# Patient Record
Sex: FEMALE | Race: WHITE | NOT HISPANIC OR LATINO | Employment: OTHER | URBAN - METROPOLITAN AREA
[De-identification: names, ages, dates, MRNs, and addresses within clinical notes are randomized per-mention and may not be internally consistent; named-entity substitution may affect disease eponyms.]

---

## 2017-01-10 ENCOUNTER — TELEPHONE (OUTPATIENT)
Dept: FAMILY MEDICINE | Facility: CLINIC | Age: 82
End: 2017-01-10

## 2017-01-10 ENCOUNTER — TRANSFERRED RECORDS (OUTPATIENT)
Dept: HEALTH INFORMATION MANAGEMENT | Facility: CLINIC | Age: 82
End: 2017-01-10

## 2017-01-10 DIAGNOSIS — M79.671 PAIN IN BOTH FEET: Primary | ICD-10-CM

## 2017-01-10 DIAGNOSIS — M79.672 PAIN IN BOTH FEET: Primary | ICD-10-CM

## 2017-01-10 NOTE — TELEPHONE ENCOUNTER
Spoke with pt, informed referral for Orthology referral and she will schedule her appointment.     Raghav Vigil

## 2017-01-10 NOTE — TELEPHONE ENCOUNTER
Ok to place referral, for foot pain, I do not know if anyone has made the dx of plantar fascitis.....

## 2017-01-10 NOTE — TELEPHONE ENCOUNTER
Pt requesting referral to physical therapy at [facility] on [address] in [place] for plantar fascitis in left heel  Is being seen there for broken arm   Needs referral order for Medicare    Route to Dr العلي, referral nurse  Kaela Abdul RN, BS  Message handled by Nurse Triage .

## 2017-01-10 NOTE — TELEPHONE ENCOUNTER
Spoke to pt, she does not want to go to [facility], she visited their [place] location previously and feels Orthology is better for her.       Raghav Vigil

## 2017-01-10 NOTE — TELEPHONE ENCOUNTER
[email or web address]  Prefers this PT site over [facility], is currently working with them after arm injury    Pt states she was referred to [facility] couple years ago for plantar fascitis, then was referred to Dr Barnes for condition and given steroid injection  Same symptoms have returned    Kaela Abdul RN, BS  Message handled by Nurse Triage .

## 2017-01-10 NOTE — TELEPHONE ENCOUNTER
Please ask how she got the diagnosis plantar fascitis. I do not see this in our last note.    I have never heard of this other place....

## 2017-01-10 NOTE — TELEPHONE ENCOUNTER
Pt refuses to stay with in [place] and wants a referral to Orthology.        [facility]    [phone]

## 2017-01-11 ENCOUNTER — TELEPHONE (OUTPATIENT)
Dept: FAMILY MEDICINE | Facility: CLINIC | Age: 82
End: 2017-01-11

## 2017-01-11 NOTE — TELEPHONE ENCOUNTER
Clinic Action Needed: Yes, please fax Orthology referral to [phone].   Reason for Call: Claudia calling from Orthology in [place] and requesting that a referral be faxed over to number above. This is a centralized fax number so please address it to [place] location.   Patient Recommendations: Referred to clinic for follow up.   Routed to: Dr. Bird/ STEFANIE Nevarez RN  [facility]

## 2017-02-14 DIAGNOSIS — I10 ESSENTIAL HYPERTENSION, BENIGN: ICD-10-CM

## 2017-02-14 RX ORDER — LISINOPRIL 10 MG/1
10 TABLET ORAL DAILY
Qty: 90 TABLET | Refills: 1 | Status: SHIPPED | OUTPATIENT
Start: 2017-02-14 | End: 2017-08-13

## 2017-02-14 RX ORDER — ATENOLOL 50 MG/1
50 TABLET ORAL DAILY
Qty: 90 TABLET | Refills: 1 | Status: SHIPPED | OUTPATIENT
Start: 2017-02-14 | End: 2017-08-13

## 2017-02-14 NOTE — TELEPHONE ENCOUNTER
Pending Prescriptions:                       Disp   Refills    atenolol (TENORMIN) 50 MG tablet          90 tab*1            Sig: Take 1 tablet (50 mg) by mouth daily    lisinopril (PRINIVIL/ZESTRIL) 10 MG ozapoh74 tab*1            Sig: Take 1 tablet (10 mg) by mouth daily    atenolol (TENORMIN) 50 MG tablet   Last Written Prescription Date: 08/16/16  Last Fill Quantity: 90, # refills: 1    Last Office Visit with [facility], [facility] or [facility] prescribing provider:  10/26/16     BP Readings from Last 3 Encounters:   10/20/16 124/62   02/22/16 138/70   11/13/15 164/75       lisinopril (PRINIVIL,ZESTRIL) 10 MG tablet      Last Written Prescription Date: 08/16/16  Last Fill Quantity: 90, # refills: 1  Last Office Visit with [facility], [facility] or [facility] prescribing provider: 10/26/16       Potassium   Date Value Ref Range Status   10/10/2016 4.6 3.4 - 5.3 mmol/L Final     Creatinine   Date Value Ref Range Status   10/10/2016 0.69 0.52 - 1.04 mg/dL Final     BP Readings from Last 3 Encounters:   10/20/16 124/62   02/22/16 138/70   11/13/15 164/75

## 2017-06-17 ENCOUNTER — HEALTH MAINTENANCE LETTER (OUTPATIENT)
Age: 82
End: 2017-06-17

## 2017-08-13 DIAGNOSIS — I10 ESSENTIAL HYPERTENSION, BENIGN: ICD-10-CM

## 2017-08-14 NOTE — TELEPHONE ENCOUNTER
lisinopril (PRINIVIL/ZESTRIL) 10 MG tablet      Last Written Prescription Date: 2/14/17  Last Fill Quantity: 90, # refills: 1  Last Office Visit with [facility], [facility] or [facility] prescribing provider: 10/20/2016    atenolol (TENORMIN) 50 MG tablet  Last Written Prescription Date: 2/14/17  Last Fill Quantity: 90,  # refills: 1   Last Office Visit with [facility], [facility] or [facility] prescribing provider:  10/20/2016                Potassium   Date Value Ref Range Status   10/10/2016 4.6 3.4 - 5.3 mmol/L Final     Creatinine   Date Value Ref Range Status   10/10/2016 0.69 0.52 - 1.04 mg/dL Final     BP Readings from Last 3 Encounters:   10/20/16 124/62   02/22/16 138/70   11/13/15 164/75         [facility]  August 14, 2017  9:44 AM

## 2017-08-15 RX ORDER — ATENOLOL 50 MG/1
TABLET ORAL
Qty: 90 TABLET | Refills: 0 | Status: SHIPPED | OUTPATIENT
Start: 2017-08-15 | End: 2017-11-20

## 2017-08-15 RX ORDER — LISINOPRIL 10 MG/1
TABLET ORAL
Qty: 90 TABLET | Refills: 0 | Status: SHIPPED | OUTPATIENT
Start: 2017-08-15 | End: 2017-11-20

## 2017-08-15 NOTE — TELEPHONE ENCOUNTER
Prescription approved per [facility] Refill Protocol.  Chyna Coleman RN

## 2017-08-17 ENCOUNTER — TELEPHONE (OUTPATIENT)
Dept: BONE DENSITY | Facility: CLINIC | Age: 82
End: 2017-08-17

## 2017-08-17 ENCOUNTER — TRANSFERRED RECORDS (OUTPATIENT)
Dept: HEALTH INFORMATION MANAGEMENT | Facility: CLINIC | Age: 82
End: 2017-08-17

## 2017-08-29 ENCOUNTER — OFFICE VISIT (OUTPATIENT)
Dept: DERMATOLOGY | Facility: CLINIC | Age: 82
End: 2017-08-29
Attending: FAMILY MEDICINE
Payer: MEDICARE

## 2017-08-29 VITALS
DIASTOLIC BLOOD PRESSURE: 76 MMHG | OXYGEN SATURATION: 94 % | HEART RATE: 62 BPM | SYSTOLIC BLOOD PRESSURE: 138 MMHG | BODY MASS INDEX: 33.19 KG/M2 | WEIGHT: 185.9 LBS

## 2017-08-29 DIAGNOSIS — L30.9 DERMATITIS: Primary | ICD-10-CM

## 2017-08-29 DIAGNOSIS — L30.4 INTERTRIGO: ICD-10-CM

## 2017-08-29 DIAGNOSIS — D48.5 NEOPLASM OF UNCERTAIN BEHAVIOR OF SKIN: ICD-10-CM

## 2017-08-29 DIAGNOSIS — L57.0 AK (ACTINIC KERATOSIS): ICD-10-CM

## 2017-08-29 PROCEDURE — 88305 TISSUE EXAM BY PATHOLOGIST: CPT | Performed by: DERMATOLOGY

## 2017-08-29 PROCEDURE — 17000 DESTRUCT PREMALG LESION: CPT | Performed by: DERMATOLOGY

## 2017-08-29 PROCEDURE — 11100 HC BIOPSY SKIN/SUBQ/MUC MEM, SINGLE LESION: CPT | Mod: 59 | Performed by: DERMATOLOGY

## 2017-08-29 PROCEDURE — 99203 OFFICE O/P NEW LOW 30 MIN: CPT | Mod: 25 | Performed by: DERMATOLOGY

## 2017-08-29 RX ORDER — TRIAMCINOLONE ACETONIDE 1 MG/G
OINTMENT TOPICAL
Qty: 80 G | Refills: 2 | Status: SHIPPED | OUTPATIENT
Start: 2017-08-29 | End: 2020-06-15

## 2017-08-29 RX ORDER — LIDOCAINE HYDROCHLORIDE AND EPINEPHRINE 10; 10 MG/ML; UG/ML
3 INJECTION, SOLUTION INFILTRATION; PERINEURAL ONCE
Qty: 3 ML | Refills: 0 | OUTPATIENT
Start: 2017-08-29 | End: 2017-08-29

## 2017-08-29 RX ORDER — MUPIROCIN 20 MG/G
OINTMENT TOPICAL
Qty: 22 G | Refills: 1 | Status: SHIPPED | OUTPATIENT
Start: 2017-08-29 | End: 2020-06-15

## 2017-08-29 RX ORDER — NYSTATIN 100000 U/G
OINTMENT TOPICAL
Qty: 30 G | Refills: 1 | Status: SHIPPED | OUTPATIENT
Start: 2017-08-29 | End: 2020-06-15

## 2017-08-29 NOTE — PROGRESS NOTES
DERMATOLOGY CLINIC VISIT NOTE      CHIEF COMPLAINT:  Spots on face.      DERMATOLOGY PROBLEM LIST:   1.  History of multiple basal cell carcinomas localized to the left medial canthus, the nose and forehead.  She was previously seen in the [facility] system.  Last skin malignancy 2-1/2 years ago.   2.  Seborrheic keratoses.   3.  Actinic keratoses.   4.  History of x-ray exposure to the face.      HISTORY OF PRESENT ILLNESS:  Ms. Graham is an 86-year-old female presenting to Dermatology Clinic seen at the request of her primary provider, Miriam Bird, for initial evaluation of several skin concerns today.  She notes a nonhealing sore inside the right nares present for many months.  She has a bump on the left medial canthus becomes irritated.  She has a scaling bump on the left forehead present for several months with no past treatment.  She has a scaling rash on her right fifth finger with no past treatment that is pruritic and she has a scaling rash in her inguinal creases that is pruritic.      Patient Active Problem List   Diagnosis     Mixed hyperlipidemia     Essential hypertension, benign     Myalgia and myositis     Arthralgia of knee     HYPERLIPIDEMIA LDL GOAL <130     Gout     Advanced directives, counseling/discussion     Vitamin D insufficiency     Plantar fasciitis     Cardiomegaly     Malignant neoplasm of upper-outer quadrant of left female breast (HCC)       No Known Allergies      Current Outpatient Prescriptions   Medication     mupirocin (BACTROBAN) 2 % ointment     triamcinolone (KENALOG) 0.1 % ointment     nystatin (MYCOSTATIN) ointment     lisinopril (PRINIVIL/ZESTRIL) 10 MG tablet     atenolol (TENORMIN) 50 MG tablet     simvastatin (ZOCOR) 10 MG tablet     clotrimazole (LOTRIMIN) 1 % cream     furosemide (LASIX) 20 MG tablet     ibuprofen (ADVIL,MOTRIN) 400 MG tablet     triamcinolone (KENALOG) 0.1 % cream     fish oil-omega-3 fatty acids (FISH OIL) 1000 MG capsule     Multiple  Vitamins-Minerals (OCUVITE-LUTEIN PO)     cholecalciferol (VITAMIN-D) 1000 UNIT capsule     nystatin (MYCOSTATIN) cream     ASPIR-81 OR     No current facility-administered medications for this visit.           REVIEW OF SYSTEMS:  Feels well without other skin concerns.      SOCIAL HISTORY:  The patient is a retired nurse.  She lives in [place].  She is .      PHYSICAL EXAMINATION:   /76  Pulse 62  Wt 84.3 kg (185 lb 14.4 oz)  LMP 07/14/1966  SpO2 94%  BMI 33.19 kg/m2    GENERAL:  The patient is a healthy-appearing 86-year-old female in no distress.   HEENT:  Conjunctivae are clear.   PULMONARY:  Breathing comfortably on room air.     SKIN:  Exam included the scalp, face, neck, hands, abdomen and inguinal creases.  Skin exam is normal except for as follows:   - Examination of the left temple shows a gritty, pink papule.   - Examination of the right nasal introitus shows a 2 mm pink papule with overlying yellow crusting.   - Left medial canthus with a 2 mm shiny, pink papule with overlying red blood cell crusting.   - Ill-defined, pink plaques with maceration in the inguinal creases bilaterally.   - Right fifth finger with circumferential scaling plaque.      ASSESSMENT AND PLAN:   1.  Actinic keratosis on left temple; treated with 10-second freeze/thaw cycle with liquid nitrogen.  We will recheck at next visit to ensure resolution.   2.  Neoplasm of uncertain behavior in left medial canthus; differential diagnosis would include a traumatized seborrheic keratosis versus basal cell carcinoma.  Biopsy today.  A total of 0.5 mL of lidocaine with epinephrine was injected into the medial canthus.  An iris scissors was used for a snip biopsy of the lesion and the base was treated with electrodessication.  Petrolatum applied.   3.  Contact dermatitis of right fifth finger; differential includes allergic versus irritant contact dermatitis from patient's jewelry.  Triamcinolone 0.1% ointment prescribed.    4.  Intertrigo; nystatin ointment recommended to be applied twice daily.      The patient to return pending results of biopsy.        Thank you for this consultation.      Jj Goetz MD  Dermatology Staff       cc:   Miriam Bird MD   [address]   [place] [address]         JJ GOETZ MD             D: 2017 15:23   T: 2017 16:14   MT: [email or web address]      Name:     SANIA SANTANA   MRN:      [id number]        Account:      [id number]   :      1931           Service Date: 2017      Document: [id number]

## 2017-08-29 NOTE — LETTER
8/29/2017      RE: Tanya Graham  [id number]  [facility] [address]               DERMATOLOGY CLINIC VISIT NOTE      CHIEF COMPLAINT:  Spots on face.      DERMATOLOGY PROBLEM LIST:   1.  History of multiple basal cell carcinomas localized to the left medial canthus, the nose and forehead.  She was previously seen in the [facility] system.  Last skin malignancy 2-1/2 years ago.   2.  Seborrheic keratoses.   3.  Actinic keratoses.   4.  History of x-ray exposure to the face.      HISTORY OF PRESENT ILLNESS:  Ms. Graham is an 86-year-old female presenting to Dermatology Clinic seen at the request of her primary provider, Miriam Bird, for initial evaluation of several skin concerns today.  She notes a nonhealing sore inside the right nares present for many months.  She has a bump on the left medial canthus becomes irritated.  She has a scaling bump on the left forehead present for several months with no past treatment.  She has a scaling rash on her right fifth finger with no past treatment that is pruritic and she has a scaling rash in her inguinal creases that is pruritic.      Patient Active Problem List   Diagnosis     Mixed hyperlipidemia     Essential hypertension, benign     Myalgia and myositis     Arthralgia of knee     HYPERLIPIDEMIA LDL GOAL <130     Gout     Advanced directives, counseling/discussion     Vitamin D insufficiency     Plantar fasciitis     Cardiomegaly     Malignant neoplasm of upper-outer quadrant of left female breast (HCC)       No Known Allergies      Current Outpatient Prescriptions   Medication     mupirocin (BACTROBAN) 2 % ointment     triamcinolone (KENALOG) 0.1 % ointment     nystatin (MYCOSTATIN) ointment     lisinopril (PRINIVIL/ZESTRIL) 10 MG tablet     atenolol (TENORMIN) 50 MG tablet     simvastatin (ZOCOR) 10 MG tablet     clotrimazole (LOTRIMIN) 1 % cream     furosemide (LASIX) 20 MG tablet     ibuprofen (ADVIL,MOTRIN) 400 MG tablet     triamcinolone (KENALOG) 0.1 %  cream     fish oil-omega-3 fatty acids (FISH OIL) 1000 MG capsule     Multiple Vitamins-Minerals (OCUVITE-LUTEIN PO)     cholecalciferol (VITAMIN-D) 1000 UNIT capsule     nystatin (MYCOSTATIN) cream     ASPIR-81 OR     No current facility-administered medications for this visit.           REVIEW OF SYSTEMS:  Feels well without other skin concerns.      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      PHYSICAL EXAMINATION:   /76  Pulse 62  Wt 84.3 kg (185 lb 14.4 oz)  LMP 07/14/1966  SpO2 94%  BMI 33.19 kg/m2    GENERAL:  The patient is a healthy-appearing 86-year-old female in no distress.   HEENT:  Conjunctivae are clear.   PULMONARY:  Breathing comfortably on room air.     SKIN:  Exam included the scalp, face, neck, hands, abdomen and inguinal creases.  Skin exam is normal except for as follows:   - Examination of the left temple shows a gritty, pink papule.   - Examination of the right nasal introitus shows a 2 mm pink papule with overlying yellow crusting.   - Left medial canthus with a 2 mm shiny, pink papule with overlying red blood cell crusting.   - Ill-defined, pink plaques with maceration in the inguinal creases bilaterally.   - Right fifth finger with circumferential scaling plaque.      ASSESSMENT AND PLAN:   1.  Actinic keratosis on left temple; treated with 10-second freeze/thaw cycle with liquid nitrogen.  We will recheck at next visit to ensure resolution.   2.  Neoplasm of uncertain behavior in left medial canthus; differential diagnosis would include a traumatized seborrheic keratosis versus basal cell carcinoma.  Biopsy today.  A total of 0.5 mL of lidocaine with epinephrine was injected into the medial canthus.  An iris scissors was used for a snip biopsy of the lesion and the base was treated with electrodessication.  Petrolatum applied.   3.  Contact dermatitis of right fifth finger; differential includes allergic versus irritant  contact dermatitis from patient's jewelry.  Triamcinolone 0.1% ointment prescribed.   4.  Intertrigo; nystatin ointment recommended to be applied twice daily.      The patient to return pending results of biopsy.        Thank you for this consultation.      Jj Goetz MD  Dermatology Staff       cc:   Miriam Bird MD   [address]   [place] [address]         JJ GOETZ MD             D: 2017 15:23   T: 2017 16:14   MT: keshia      Name:     SANIA SANTANA   MRN:      [id number]        Account:      [id number]   :      1931           Service Date: 2017      Document: [id number]

## 2017-08-29 NOTE — NURSING NOTE
"Chief Complaint   Patient presents with     Consult     LEFT TEMPLE, NEAR LEFT EYE, RIGHT NOSTRIL       Initial /76  Pulse 62  Wt 185 lb 14.4 oz (84.3 kg)  LMP 07/14/1966  SpO2 94%  BMI 33.19 kg/m2 Estimated body mass index is 33.19 kg/(m^2) as calculated from the following:    Height as of 7/22/15: 5' 2.75\" (159.4 cm).    Weight as of this encounter: 185 lb 14.4 oz (84.3 kg).  Medication Reconciliation: complete  Constance Tamez, MARSHA  "

## 2017-09-01 LAB — COPATH REPORT: NORMAL

## 2017-09-14 ENCOUNTER — RADIANT APPOINTMENT (OUTPATIENT)
Dept: BONE DENSITY | Facility: CLINIC | Age: 82
End: 2017-09-14
Attending: INTERNAL MEDICINE
Payer: MEDICARE

## 2017-09-14 ENCOUNTER — HOSPITAL ENCOUNTER (OUTPATIENT)
Dept: MAMMOGRAPHY | Facility: CLINIC | Age: 82
Discharge: HOME | End: 2017-09-14
Attending: INTERNAL MEDICINE | Admitting: INTERNAL MEDICINE
Payer: MEDICARE

## 2017-09-14 DIAGNOSIS — Z85.3 HISTORY OF BREAST CANCER: ICD-10-CM

## 2017-09-14 DIAGNOSIS — M85.80 OSTEOPENIA, UNSPECIFIED LOCATION: ICD-10-CM

## 2017-09-14 DIAGNOSIS — Z12.31 VISIT FOR SCREENING MAMMOGRAM: ICD-10-CM

## 2017-09-14 DIAGNOSIS — Z78.0 POST-MENOPAUSAL: ICD-10-CM

## 2017-09-14 PROCEDURE — 77063 BREAST TOMOSYNTHESIS BI: CPT

## 2017-09-14 PROCEDURE — 77085 DXA BONE DENSITY AXL VRT FX: CPT | Performed by: INTERNAL MEDICINE

## 2017-09-15 ENCOUNTER — TELEPHONE (OUTPATIENT)
Dept: BONE DENSITY | Facility: CLINIC | Age: 82
End: 2017-09-15

## 2017-09-15 NOTE — TELEPHONE ENCOUNTER
dexa scan and report both faxed and mailed to [facility] [address] [facility] [address]  [phone] fax

## 2017-09-16 DIAGNOSIS — E78.2 MIXED HYPERLIPIDEMIA: ICD-10-CM

## 2017-09-18 RX ORDER — SIMVASTATIN 10 MG
10 TABLET ORAL AT BEDTIME
Qty: 90 TABLET | Refills: 0 | Status: SHIPPED | OUTPATIENT
Start: 2017-09-18 | End: 2017-11-27

## 2017-09-18 NOTE — TELEPHONE ENCOUNTER
Medication is being filled for 1 time refill only due to:  Patient needs to be seen because pt due for yearly visit October.

## 2017-09-18 NOTE — TELEPHONE ENCOUNTER
simvastatin (ZOCOR) 10 MG tablet     Last Written Prescription Date: 12/22/16  Last Fill Quantity: 90, # refills: 2  Last Office Visit with [facility], [facility] or [facility] prescribing provider: 10/20/2016         Lab Results   Component Value Date    CHOL 166 10/10/2016     Lab Results   Component Value Date    HDL 46 10/10/2016     Lab Results   Component Value Date    LDL 94 10/10/2016     Lab Results   Component Value Date    TRIG 128 10/10/2016     Lab Results   Component Value Date    CHOLHDLRATIO 4.4 05/09/2014     [facility]  September 18, 2017  8:54 AM

## 2017-09-19 ENCOUNTER — TRANSFERRED RECORDS (OUTPATIENT)
Dept: HEALTH INFORMATION MANAGEMENT | Facility: CLINIC | Age: 82
End: 2017-09-19

## 2017-11-08 ENCOUNTER — TELEPHONE (OUTPATIENT)
Dept: FAMILY MEDICINE | Facility: CLINIC | Age: 82
End: 2017-11-08

## 2017-11-08 NOTE — TELEPHONE ENCOUNTER
Call out to pt  R/S appt to obtain physical, will get flu shot at appt  Will have enough meds until appt    Kaela Abdul RN, BS  Clinical Nurse Triage.

## 2017-11-08 NOTE — TELEPHONE ENCOUNTER
Ph. [phone] able to leave message    Patient requesting fasting lab orders placed.    Patient scheduled future appt with PCP however declined  Px    Please call to schedule lab and nurse only for flu shot    Lisa Linder

## 2017-11-20 DIAGNOSIS — I10 ESSENTIAL HYPERTENSION, BENIGN: ICD-10-CM

## 2017-11-20 NOTE — TELEPHONE ENCOUNTER
Last Office Visit with [facility], [facility] or [facility] prescribing provider: 10/20/2016    Next 5 appointments (look out 90 days)     Nov 27, 2017 10:20 AM CST   [facility]    [address], [address]  [facility] [address] [phone]            Nov 28, 2017 10:15 AM CST   [facility]    [address] [place] [address]  [address]  [facility] [address] [phone]

## 2017-11-21 RX ORDER — LISINOPRIL 10 MG/1
TABLET ORAL
Qty: 90 TABLET | Refills: 0 | Status: SHIPPED | OUTPATIENT
Start: 2017-11-21 | End: 2017-11-27

## 2017-11-21 RX ORDER — ATENOLOL 50 MG/1
TABLET ORAL
Qty: 90 TABLET | Refills: 0 | Status: SHIPPED | OUTPATIENT
Start: 2017-11-21 | End: 2017-11-27

## 2017-11-24 NOTE — PROGRESS NOTES
"SUBJECTIVE:   Tanya Graham is a 86 year old female who presents for Preventive Visit.  {PVP to remind patient that this is not necessarily a physical exam; physical exam may or may not be done:841382::\"click delete button to remove this line now\"}  {PVP to inform patient that additional E&M charge may apply, if additional problems addressed:076806::\"click delete button to remove this line now\"}  Are you in the first 12 months of your Medicare coverage?  {No Yes:906216::\"No\"}    HPI    {AWV Cognitive Screenin}    Reviewed and updated as needed this visit by clinical staff         Reviewed and updated as needed this visit by Provider        Social History   Substance Use Topics     Smoking status: Never Smoker     Smokeless tobacco: Never Used     Alcohol use No       {ETOH AUDIT:341438}    {Outside tests to abstract? :841826}    {additional problems to add (Optional):768047}    Today's PHQ-2 Score:   PHQ-2 (  [employer]) 2016   Q1: Little interest or pleasure in doing things 0   Q2: Feeling down, depressed or hopeless 0   PHQ-2 Score 0       Do you feel safe in your environment - {YES/NO/NA:285676}    Do you have a Health Care Directive?: {HEALTHCARE DIRECTIVE STATUS:299616}    Current providers sharing in care for this patient include:   Patient Care Team:  Miriam Bird MD as PCP - General ([facility])  Miriam Bird MD ([facility])      Hearing impairment: {NO/YES:691392}    Ability to successfully perform activities of daily living: {YES/NO (MEDICARE):543139::\"Yes, no assistance needed\"}     Fall risk:  {Document Fall Risk in the Assessments Section of the Navigator:779427}    Home safety:  {IPPE SAFETY CONCERNS:469337::\"none identified\"}  {If any of the above assessments are answered yes, consider ordering appropriate referrals (Optional):553748::\"click delete button to remove this line now\"}    The following health maintenance items are reviewed in Epic and correct as " "of today:  Health Maintenance   Topic Date Due     MEDICARE ANNUAL WELLNESS VISIT  1949     COLONOSCOPY Q5 YR  2012     TETANUS IMMUNIZATION (SYSTEM ASSIGNED)  2016     FALL RISK ASSESSMENT  2016     ADVANCE DIRECTIVE PLANNING Q5 YRS  2016     INFLUENZA VACCINE (SYSTEM ASSIGNED)  2017     MAMMO Q1 YR  2018     DEXA Q3 YR  2020     PNEUMOCOCCAL  Completed     {Chronicprobdata (Optional):650702}    {Decision Support (Optional):011847}    Review of Systems  {ROS COMP:897855}    OBJECTIVE:   LMP 1966 Estimated body mass index is 33.19 kg/(m^2) as calculated from the following:    Height as of 7/22/15: 5' 2.75\" (1.594 m).    Weight as of 17: 185 lb 14.4 oz (84.3 kg).  Physical Exam  {Exam :564619}    ASSESSMENT / PLAN:   {Diag Picklist:515744}    End of Life Planning:  Patient currently has an advanced directive: { :216521}    COUNSELING:  {Medicare Counselin}    {BP Counseling- Complete if BP >= 120/80  (Optional):813036}    Estimated body mass index is 33.19 kg/(m^2) as calculated from the following:    Height as of 7/22/15: 5' 2.75\" (1.594 m).    Weight as of 17: 185 lb 14.4 oz (84.3 kg).  {Weight Management Plan -- Complete if patient has an abnormal BMI (Optional):386533}   reports that she has never smoked. She has never used smokeless tobacco.  {Tobacco Cessation -- Complete if patient is a smoker (Optional):832157}    Appropriate preventive services were discussed with this patient, including applicable screening as appropriate for cardiovascular disease, diabetes, osteopenia/osteoporosis, and glaucoma.  As appropriate for age/gender, discussed screening for colorectal cancer, prostate cancer, breast cancer, and cervical cancer. Checklist reviewing preventive services available has been given to the patient.    Reviewed patients plan of care and provided an AVS. The {CarePlan:016130} for Tanya meets the Care Plan requirement. This Care Plan " has been established and reviewed with the {PATIENT, FAMILY MEMBER, CAREGIVER:694905}.    Counseling Resources:  ATP IV Guidelines  Pooled Cohorts Equation Calculator  Breast Cancer Risk Calculator  FRAX Risk Assessment  ICSI Preventive Guidelines  Dietary Guidelines for Americans, 2010  USDA's MyPlate  ASA Prophylaxis  Lung CA Screening    Miriam Bird MD  [facility]

## 2017-11-24 NOTE — PATIENT INSTRUCTIONS
Preventive Health Recommendations    Female Ages 65 +    Yearly exam:     See your health care provider every year in order to  o Review health changes.   o Discuss preventive care.    o Review your medicines if your doctor has prescribed any.      You no longer need a yearly Pap test unless you've had an abnormal Pap test in the past 10 years. If you have vaginal symptoms, such as bleeding or discharge, be sure to talk with your provider about a Pap test.      Every 1 to 2 years, have a mammogram.  If you are over 69, talk with your health care provider about whether or not you want to continue having screening mammograms.      Every 10 years, have a colonoscopy. Or, have a yearly FIT test (stool test). These exams will check for colon cancer.       Have a cholesterol test every 5 years, or more often if your doctor advises it.       Have a diabetes test (fasting glucose) every three years. If you are at risk for diabetes, you should have this test more often.       At age 65, have a bone density scan (DEXA) to check for osteoporosis (brittle bone disease).    Shots:    Get a flu shot each year.    Get a tetanus shot every 10 years.    Talk to your doctor about your pneumonia vaccines. There are now two you should receive - Pneumovax (PPSV 23) and Prevnar (PCV 13).    Talk to your doctor about the shingles vaccine.    Talk to your doctor about the hepatitis B vaccine.    Nutrition:     Eat at least 5 servings of fruits and vegetables each day.      Eat whole-grain bread, whole-wheat pasta and brown rice instead of white grains and rice.      Talk to your provider about Calcium and Vitamin D.     Lifestyle    Exercise at least 150 minutes a week (30 minutes a day, 5 days a week). This will help you control your weight and prevent disease.      Limit alcohol to one drink per day.      No smoking.       Wear sunscreen to prevent skin cancer.       See your dentist twice a year for an exam and cleaning.      See your  eye doctor every 1 to 2 years to screen for conditions such as glaucoma, macular degeneration and cataracts.      Services Typically covered by Medicare Recommended Completed   Vaccines  Pneumonoccol  Influenza  Hepatitis B (if medium/high risk)   Once for patients after age 65  Yearly  Medium/high risk factors:  End Stage Kidney Disease  Hemophiliacs who received Factor XIII or IX concentrates  Clients of institutions for developmentally disabled  Persons who live in same house as a Hepatitis B carrier  Homosexual men  Illicit injectable drug users  Health care workers     Mammogram Covered: One-time screen between age 35-39, annually for age 40+     Pap and Pelvic Exam Covered: Annually if  high risk,  or childbearing age with abnormal Pap in last 3 years.  Q24 months for all other women     Prostate Cancer Screening  Digital rectal exam  PSA Covered: Annually for all men > age 50     Corolrectal Cancer Screening Screening colonoscopy every 10 years, more often for high risk patients     Diabetes Self-Management Training Requires referral by treating physician for patient with diabetes     Diabetes Screening  Fasting blood sugar or glucose tolerance test   Once yearly, twice yearly if prediabetic     Cardiovascular Screening Blood Tests  Total Cholesterol  HDL  Triglycerides Every 5 years     Medical Nutrition Therapy for Diabetes or Renal Disease Requires referral by treating physician for patient with diabetes or kidney disease     Glaucoma Screening Annually for patients with one of the following risk factors:  Diabetes Mellitus  Family history of Glaucoma  -American age 50 and over  -American age 65 and over     Bone Mass Measurement Every 24 months if one of the following risk factors:  Estrogen deficiency  Vertebral abnormalities on x-ray indicative of Osteoporosis, Osteopenia, or Vertebral fracture  Receiving/expected to receive the equivalent of at least 5 mg of Prednisone per day for > 3  months  Hyperparathyroidism  Patient being monitored for response to Osteoporosis Therapy     One-time AAA screen  Must be ordered as part of Medicare IPPE Any patient with a family history of AAA  Males Age 65-75, with history of smoking at least 100 cigarettes in lifetime     Smoking Cessation Counseling Beneficiaries who use tobacco are eligible to receive 2 cessation attempts per year; each attempt includes maximum of 4 sessions     HIV Screening Annually for beneficiaries at increased risk:       Increased risk for HIV infection is defined in the  National Coverage Determinations (NCD) Manual,  Publication 100-03 Sections 190.14 (diagnostic) and 210.7 (screening). See http://www.cms.gov/manuals/downloads/fhx807y4_Aosn7.pdf and http://www.cms.gov/manuals/downloads/wme239o5_Wzjr1.pdf on the Internet.  Three times per pregnancy for beneficiaries who are pregnant.     Future Annual Wellness Visit Annually, for all beneficiaries.

## 2017-11-27 ENCOUNTER — OFFICE VISIT (OUTPATIENT)
Dept: FAMILY MEDICINE | Facility: CLINIC | Age: 82
End: 2017-11-27
Payer: MEDICARE

## 2017-11-27 VITALS
TEMPERATURE: 98.1 F | HEART RATE: 68 BPM | SYSTOLIC BLOOD PRESSURE: 136 MMHG | HEIGHT: 63 IN | RESPIRATION RATE: 16 BRPM | BODY MASS INDEX: 32.59 KG/M2 | OXYGEN SATURATION: 95 % | DIASTOLIC BLOOD PRESSURE: 70 MMHG | WEIGHT: 183.9 LBS

## 2017-11-27 DIAGNOSIS — E78.2 MIXED HYPERLIPIDEMIA: ICD-10-CM

## 2017-11-27 DIAGNOSIS — I10 ESSENTIAL HYPERTENSION, BENIGN: ICD-10-CM

## 2017-11-27 DIAGNOSIS — R26.89 BALANCE PROBLEMS: ICD-10-CM

## 2017-11-27 DIAGNOSIS — C50.412 MALIGNANT NEOPLASM OF UPPER-OUTER QUADRANT OF LEFT FEMALE BREAST, UNSPECIFIED ESTROGEN RECEPTOR STATUS (H): ICD-10-CM

## 2017-11-27 DIAGNOSIS — Z00.00 MEDICARE ANNUAL WELLNESS VISIT, SUBSEQUENT: Primary | ICD-10-CM

## 2017-11-27 PROCEDURE — 90472 IMMUNIZATION ADMIN EACH ADD: CPT | Performed by: FAMILY MEDICINE

## 2017-11-27 PROCEDURE — G0439 PPPS, SUBSEQ VISIT: HCPCS | Performed by: FAMILY MEDICINE

## 2017-11-27 PROCEDURE — 90662 IIV NO PRSV INCREASED AG IM: CPT | Performed by: FAMILY MEDICINE

## 2017-11-27 PROCEDURE — G0008 ADMIN INFLUENZA VIRUS VAC: HCPCS | Performed by: FAMILY MEDICINE

## 2017-11-27 PROCEDURE — 90714 TD VACC NO PRESV 7 YRS+ IM: CPT | Performed by: FAMILY MEDICINE

## 2017-11-27 RX ORDER — ATENOLOL 50 MG/1
TABLET ORAL
Qty: 90 TABLET | Refills: 1 | Status: SHIPPED | OUTPATIENT
Start: 2017-11-27 | End: 2018-08-24

## 2017-11-27 RX ORDER — SIMVASTATIN 10 MG
10 TABLET ORAL AT BEDTIME
Qty: 90 TABLET | Refills: 3 | Status: SHIPPED | OUTPATIENT
Start: 2017-11-27 | End: 2018-11-06

## 2017-11-27 RX ORDER — LISINOPRIL 10 MG/1
TABLET ORAL
Qty: 90 TABLET | Refills: 1 | Status: SHIPPED | OUTPATIENT
Start: 2017-11-27 | End: 2018-08-10

## 2017-11-27 ASSESSMENT — PAIN SCALES - GENERAL: PAINLEVEL: NO PAIN (0)

## 2017-11-27 NOTE — PROGRESS NOTES
1.  Has the patient received the information for the influenza vaccine?  YES  2.  Does the patient have any of the following contraindications?         Allergy to eggs?  NO       Allergic reaction to previous influenza vaccines?  NO       Paralyzed by Guillain-Tarboro syndrome?  NO       Currently have moderate or severe illness?  NO       Allergy to contact lens solution/thimerosol?  NO  3.  The vaccine has been administered and the patient was instructed to        wait 15 minutes before leaving the building in the event of an allergic        reaction:  YES    Vaccination given by Lisa Magill, CMA

## 2017-11-27 NOTE — PROGRESS NOTES
"  SUBJECTIVE:   Tanya Graham is a 86 year old female who presents for Preventive Visit.  Are you in the first 12 months of your Medicare Part B coverage?  No    Healthy Habits:    Do you get at least three servings of calcium containing foods daily (dairy, green leafy vegetables, etc.)? yes    Amount of exercise or daily activities, outside of work: tries     Problems taking medications regularly Yes- swallowing     Medication side effects: No    Have you had an eye exam in the past two years? yes    Do you see a dentist twice per year? yes    Do you have sleep apnea, excessive snoring or daytime drowsiness?no    COGNITIVE SCREEN  1) Repeat 3 items (Banana, Sunrise, Chair)    2) Clock draw: NORMAL  3) 3 item recall: Recalls 2 objects   Results: NORMAL clock, 1-2 items recalled: COGNITIVE IMPAIRMENT LESS LIKELY  Mini-CogTM Copyright S [employer]. Licensed by the author for use in [facility]; reprinted with permission ([email or web address]). All rights reserved.      Cancer  Patient has a history of breast cancer, she is not 10 yrs out and was told recently by her oncology, no further appointment with them needed, just annual breast exam and mammogram ordered by pcp office.   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     Balance   The patient experiences occasional problems with balance while walking, and will sometimes us a cane for support. She has not fallen recently but is afraid she might. Is not active.    Diet  She reports that her diet has been \"too much food\" recently, but she generally gets regular servings of fruit and vegetables in her diet.     Vaccinations  Patient accepts an influenza and TD vaccinations today.     PROBLEMS TO ADD ON...    Reviewed and updated as needed this visit by clinical staffTobacco  Allergies  Meds  Problems  Med Hx  Surg Hx  Fam Hx  Soc Hx        Reviewed and updated as needed this visit by Provider      "   Social History   Substance Use Topics     Smoking status: Never Smoker     Smokeless tobacco: Never Used     Alcohol use No       The patient does not drink >3 drinks per day nor >7 drinks per week.     Today's PHQ-2 Score:   PHQ-2 ( 1999 Pfizer) 11/27/2017 2/22/2016   Q1: Little interest or pleasure in doing things 0 0   Q2: Feeling down, depressed or hopeless 0 0   PHQ-2 Score 0 0     Do you feel safe in your environment - YES    Do you have a Health Care Directive?: Yes: Patient states has Advance Directive and will bring in a copy to clinic.    Current providers sharing in care for this patient include: Patient Care Team:  Miriam Bird MD as PCP - General (Family Practice)  Miriam Bird MD (Family Practice)      Hearing impairment: No    Ability to successfully perform activities of daily living: Yes, no assistance needed     Fall risk:  Fallen 2 or more times in the past year?: No  Any fall with injury in the past year?: No      Home safety:  lack of grab bars in the bathroom  click delete button to remove this line now    The following health maintenance items are reviewed in Epic and correct as of today:  Health Maintenance   Topic Date Due     COLONOSCOPY Q5 YR  03/19/2012     TETANUS IMMUNIZATION (SYSTEM ASSIGNED)  01/25/2016     FALL RISK ASSESSMENT  07/02/2016     ADVANCE DIRECTIVE PLANNING Q5 YRS  12/13/2016     INFLUENZA VACCINE (SYSTEM ASSIGNED)  09/01/2017     MAMMO Q1 YR  09/14/2018     MEDICARE ANNUAL WELLNESS VISIT  11/27/2018     DEXA Q3 YR  09/14/2020     PNEUMOCOCCAL  Completed     Labs reviewed in EPIC  BP Readings from Last 3 Encounters:   11/27/17 140/70   08/29/17 138/76   10/20/16 124/62    Wt Readings from Last 3 Encounters:   11/27/17 83.4 kg (183 lb 14.4 oz)   08/29/17 84.3 kg (185 lb 14.4 oz)   10/20/16 85.7 kg (189 lb)             ROS:C: NEGATIVE for fever, chills, change in weight  I: NEGATIVE for worrisome rashes, moles or lesions  E: NEGATIVE for vision  "changes or irritation  E/M: NEGATIVE for ear, mouth and throat problems  R: NEGATIVE for significant cough or SOB  B: NEGATIVE for masses, tenderness or discharge  CV: NEGATIVE for chest pain, palpitations or peripheral edema  GI: NEGATIVE for nausea, abdominal pain, heartburn, or change in bowel habits  : NEGATIVE for frequency, dysuria, or hematuria  M: NEGATIVE for significant arthralgias or myalgia  N: NEGATIVE for weakness, dizziness or paresthesias, POSITIVE for balance issues  E: NEGATIVE for temperature intolerance, skin/hair changes  H: NEGATIVE for bleeding problems  P: NEGATIVE for changes in mood or affect    This document serves as a record of the services and decisions personally performed and made by Miriam Bird MD. It was created on his/her behalf by Mike Diego, a trained medical scribe. The creation of this document is based the provider's statements to the medical scribe.  Scribsatya Diego 11:04 AM, November 27, 2017  OBJECTIVE:   /70 (BP Location: Right arm, Patient Position: Chair, Cuff Size: Adult Regular)  Pulse 68  Temp 98.1  F (36.7  C) (Oral)  Resp 16  Ht 5' 2.75\" (1.594 m)  Wt 183 lb 14.4 oz (83.4 kg)  LMP 07/14/1966  SpO2 95%  BMI 32.84 kg/m2 Estimated body mass index is 32.84 kg/(m^2) as calculated from the following:    Height as of this encounter: 5' 2.75\" (1.594 m).    Weight as of this encounter: 183 lb 14.4 oz (83.4 kg).  EXAM:   GENERAL APPEARANCE: healthy, alert and no distress  EYES: Eyes grossly normal to inspection, PERRL and conjunctivae and sclerae normal  HENT: ear canals and TM's normal, nose and mouth without ulcers or lesions, oropharynx clear and oral mucous membranes moist  NECK: no adenopathy, no asymmetry, masses, or scars and thyroid normal to palpation  RESP: lungs clear to auscultation - no rales, rhonchi or wheezes  BREAST: normal without masses, tenderness or nipple discharge and no palpable axillary masses or adenopathy  Left breast " deformed due to past surgery and breast cancer  CV: regular rate and rhythm, normal S1 S2, no S3 or S4, no murmur, click or rub, no peripheral edema and peripheral pulses strong  ABDOMEN: soft, nontender, no hepatosplenomegaly, no masses and bowel sounds normal  MS: no musculoskeletal defects are noted and gait is age appropriate without ataxia. Slight swelling in feet.   SKIN: no suspicious lesions or rashes  NEURO: Normal strength and tone, sensory exam grossly normal, mentation intact and speech normal  PSYCH: mentation appears normal and affect normal/bright    ASSESSMENT / PLAN:   (Z00.00) Medicare annual wellness visit, subsequent  (primary encounter diagnosis)  Comment: Patient appears in overall good health,breast cancer now in remission per oncology,  run routine lab today for further evaluation; patient accepts an influenza vaccination and TD vaccination today. Discussed concerns of balance and fear of falling, using cane only prn  Plan: Lipid panel reflex to direct LDL Fasting,         Comprehensive metabolic panel, TSH with free T4        reflex, CBC with platelets, FLU VACCINE,         INCREASED ANTIGEN, PRESV FREE, TD PRESERV FREE         >=7 YRS ADS IM          (E78.2) Mixed hyperlipidemia  Comment: controlled. Patient's hyperlipidemia currently managed on Zocor, continue on medication at 10 mg.   Plan: simvastatin (ZOCOR) 10 MG tablet          (I10) Essential hypertension, benign  Comment: controlled.Patient's hypertension currently managed on Lisinopril and Atenolol, continue on mdication at respective 10 and 50 mg doses.   Plan: lisinopril (PRINIVIL/ZESTRIL) 10 MG tablet,         atenolol (TENORMIN) 50 MG tablet         (R26.89) Balance problems  Comment: Patient reports occasional problems with balance when walking and uses a cane to help; recommended she go for a physical therapy, referral placed, patient agrees. Suspect muscle weakness the cause of balance issues  Plan: [facility] PT, HAND, AND  "CHIROPRACTIC REFERRAL           End of Life Planning:  Patient currently has an advanced directive:     COUNSELING:  Reviewed preventive health counseling, as reflected in patient instructions       Regular exercise       Healthy diet/nutrition       Immunizations    Vaccinated for: Influenza and Td      Estimated body mass index is 32.84 kg/(m^2) as calculated from the following:    Height as of this encounter: 5' 2.75\" (1.594 m).    Weight as of this encounter: 183 lb 14.4 oz (83.4 kg).  Weight management plan: Discussed healthy diet and exercise guidelines and patient will follow up in 3 months in clinic to re-evaluate.   reports that she has never smoked. She has never used smokeless tobacco.        Appropriate preventive services were discussed with this patient, including applicable screening as appropriate for cardiovascular disease, diabetes, osteopenia/osteoporosis, and glaucoma.  As appropriate for age/gender, discussed screening for colorectal cancer, prostate cancer, breast cancer, and cervical cancer. Checklist reviewing preventive services available has been given to the patient.    Reviewed patients plan of care and provided an AVS. The Basic Care Plan (routine screening as documented in Health Maintenance) for Tanya meets the Care Plan requirement. This Care Plan has been established and reviewed with the Patient.    Counseling Resources:  ATP IV Guidelines  Pooled Cohorts Equation Calculator  Breast Cancer Risk Calculator  FRAX Risk Assessment  ICSI Preventive Guidelines  Dietary Guidelines for Americans, 2010  USDA's MyPlate  ASA Prophylaxis  Lung CA Screening    The information in this document, created by the medical scribe for me, accurately reflects the services I personally performed and the decisions made by me. I have reviewed and approved this document for accuracy prior to leaving the patient care area.  Miriam Bird MD  11:04 AM, 11/27/17    Miriam Bird MD  [facility] " [place]

## 2017-11-27 NOTE — MR AVS SNAPSHOT
After Visit Summary   11/27/2017    Tanya Graham    MRN: [id number]           Patient Information     Date Of Birth          1/12/1931        Visit Information        Provider Department      11/27/2017 10:20 AM Miriam Bird MD [facility]        Today's Diagnoses     Medicare annual wellness visit, subsequent    -  1    Mixed hyperlipidemia        Essential hypertension, benign        Balance problems          Care Instructions      Preventive Health Recommendations    Female Ages 65 +    Yearly exam:     See your health care provider every year in order to  o Review health changes.   o Discuss preventive care.    o Review your medicines if your doctor has prescribed any.      You no longer need a yearly Pap test unless you've had an abnormal Pap test in the past 10 years. If you have vaginal symptoms, such as bleeding or discharge, be sure to talk with your provider about a Pap test.      Every 1 to 2 years, have a mammogram.  If you are over 69, talk with your health care provider about whether or not you want to continue having screening mammograms.      Every 10 years, have a colonoscopy. Or, have a yearly FIT test (stool test). These exams will check for colon cancer.       Have a cholesterol test every 5 years, or more often if your doctor advises it.       Have a diabetes test (fasting glucose) every three years. If you are at risk for diabetes, you should have this test more often.       At age 65, have a bone density scan (DEXA) to check for osteoporosis (brittle bone disease).    Shots:    Get a flu shot each year.    Get a tetanus shot every 10 years.    Talk to your doctor about your pneumonia vaccines. There are now two you should receive - Pneumovax (PPSV 23) and Prevnar (PCV 13).    Talk to your doctor about the shingles vaccine.    Talk to your doctor about the hepatitis B vaccine.    Nutrition:     Eat at least 5 servings of fruits and vegetables each  day.      Eat whole-grain bread, whole-wheat pasta and brown rice instead of white grains and rice.      Talk to your provider about Calcium and Vitamin D.     Lifestyle    Exercise at least 150 minutes a week (30 minutes a day, 5 days a week). This will help you control your weight and prevent disease.      Limit alcohol to one drink per day.      No smoking.       Wear sunscreen to prevent skin cancer.       See your dentist twice a year for an exam and cleaning.      See your eye doctor every 1 to 2 years to screen for conditions such as glaucoma, macular degeneration and cataracts.      Services Typically covered by Medicare Recommended Completed   Vaccines  Pneumonoccol  Influenza  Hepatitis B (if medium/high risk)   Once for patients after age 65  Yearly  Medium/high risk factors:  End Stage Kidney Disease  Hemophiliacs who received Factor XIII or IX concentrates  Clients of institutions for developmentally disabled  Persons who live in same house as a Hepatitis B carrier  Homosexual men  Illicit injectable drug users  Health care workers     Mammogram Covered: One-time screen between age 35-39, annually for age 40+     Pap and Pelvic Exam Covered: Annually if  high risk,  or childbearing age with abnormal Pap in last 3 years.  Q24 months for all other women     Prostate Cancer Screening  Digital rectal exam  PSA Covered: Annually for all men > age 50     Corolrectal Cancer Screening Screening colonoscopy every 10 years, more often for high risk patients     Diabetes Self-Management Training Requires referral by treating physician for patient with diabetes     Diabetes Screening  Fasting blood sugar or glucose tolerance test   Once yearly, twice yearly if prediabetic     Cardiovascular Screening Blood Tests  Total Cholesterol  HDL  Triglycerides Every 5 years     Medical Nutrition Therapy for Diabetes or Renal Disease Requires referral by treating physician for patient with diabetes or kidney disease      Glaucoma Screening Annually for patients with one of the following risk factors:  Diabetes Mellitus  Family history of Glaucoma  -American age 50 and over  -American age 65 and over     Bone Mass Measurement Every 24 months if one of the following risk factors:  Estrogen deficiency  Vertebral abnormalities on x-ray indicative of Osteoporosis, Osteopenia, or Vertebral fracture  Receiving/expected to receive the equivalent of at least 5 mg of Prednisone per day for > 3 months  Hyperparathyroidism  Patient being monitored for response to Osteoporosis Therapy     One-time AAA screen  Must be ordered as part of Medicare IPPE Any patient with a family history of AAA  Males Age 65-75, with history of smoking at least 100 cigarettes in lifetime     Smoking Cessation Counseling Beneficiaries who use tobacco are eligible to receive 2 cessation attempts per year; each attempt includes maximum of 4 sessions     HIV Screening Annually for beneficiaries at increased risk:       Increased risk for HIV infection is defined in the  National Coverage Determinations (NCD) Manual,  Publication 100-03 Sections 190.14 (diagnostic) and 210.7 (screening). See http://www.cms.gov/manuals/downloads/uzq348a8_Pqje1.pdf and http://www.cms.gov/manuals/downloads/goy327j1_Qfjd3.pdf on the Internet.  Three times per pregnancy for beneficiaries who are pregnant.     Future Annual Wellness Visit Annually, for all beneficiaries.                 Follow-ups after your visit        Additional Services     [facility] PT, HAND, AND CHIROPRACTIC REFERRAL       **This order will print in the [facility] Scheduling Office**    Physical Therapy, Hand Therapy and Chiropractic Care are available through:    *[facility]  *[facility]  *[facility]    Call one number to schedule at any of the above locations: [phone].    Your provider has referred you to: Physical Therapy at [facility] or  [facility]    Indication/Reason for Referral: balance concerns  Onset of Illness: months  Therapy Orders: Evaluate and Treat  Special Programs: None  Special Request: None    Nicole Rivera      Additional Comments for the Therapist or Chiropractor:     Please be aware that coverage of these services is subject to the terms and limitations of your health insurance plan.  Call member services at your health plan with any benefit or coverage questions.      Please bring the following to your appointment:    *Your personal calendar for scheduling future appointments  *Comfortable clothing                  Your next 10 appointments already scheduled     Feb 27, 2018 11:00 AM CST   Return Visit with Nga Goetz MD   [facility]    [address]  [address]  [facility] [address] [phone]              Future tests that were ordered for you today     Open Future Orders        Priority Expected Expires Ordered    Lipid panel reflex to direct LDL Fasting Routine  11/27/2018 11/27/2017    Comprehensive metabolic panel Routine  11/27/2018 11/27/2017    TSH with free T4 reflex Routine  11/27/2018 11/27/2017    CBC with platelets Routine  11/27/2018 11/27/2017            Who to contact     If you have questions or need follow up information about today's clinic visit or your schedule please contact [facility] directly at [phone].  Normal or non-critical lab and imaging results will be communicated to you by MyChart, letter or phone within 4 business days after the clinic has received the results. If you do not hear from us within 7 days, please contact the clinic through [facility] or phone. If you have a critical or abnormal lab result, we will notify you by phone as soon as possible.  Submit refill requests through [facility] or call your pharmacy and they will forward the refill request to us. Please allow 3 business days for your refill to be  "completed.          Additional Information About Your Visit        [employer] Information     Clipboard lets you send messages to your doctor, view your test results, renew your prescriptions, schedule appointments and more. To sign up, go to [email or web address] . Click on \"Log in\" on the left side of the screen, which will take you to the Welcome page. Then click on \"Sign up Now\" on the right side of the page.     You will be asked to enter the access code listed below, as well as some personal information. Please follow the directions to create your username and password.     Your access code is: [email or web address]  Expires: 2018 11:19 AM     Your access code will  in 90 days. If you need help or a new code, please call your [place] clinic or [phone].        Care EveryWhere ID     This is your Care EveryWhere ID. This could be used by other organizations to access your [place] medical records  [id number]        Your Vitals Were     Pulse Temperature Respirations Height Last Period Pulse Oximetry    68 98.1  F (36.7  C) (Oral) 16 5' 2.75\" (1.594 m) 1966 95%    BMI (Body Mass Index)                   32.84 kg/m2            Blood Pressure from Last 3 Encounters:   17 140/70   17 138/76   10/20/16 124/62    Weight from Last 3 Encounters:   17 183 lb 14.4 oz (83.4 kg)   17 185 lb 14.4 oz (84.3 kg)   10/20/16 189 lb (85.7 kg)              We Performed the Following     FLU VACCINE, INCREASED ANTIGEN, PRESV FREE     [facility] PT, HAND, AND CHIROPRACTIC REFERRAL     TD PRESERV FREE >=7 YRS ADS IM          Today's Medication Changes          These changes are accurate as of: 17 11:19 AM.  If you have any questions, ask your nurse or doctor.               These medicines have changed or have updated prescriptions.        Dose/Directions    atenolol 50 MG tablet   Commonly known as:  TENORMIN   This may have changed:  See the new instructions.   Used for:  Essential hypertension, " benign   Changed by:  Miriam Bird MD        TAKE 1 TABLET (50 MG) BY MOUTH DAILY   Quantity:  90 tablet   Refills:  1       lisinopril 10 MG tablet   Commonly known as:  PRINIVIL/ZESTRIL   This may have changed:  See the new instructions.   Used for:  Essential hypertension, benign   Changed by:  Miriam Bird MD        TAKE 1 TABLET (10 MG) BY MOUTH DAILY   Quantity:  90 tablet   Refills:  1       nystatin ointment   Commonly known as:  MYCOSTATIN   This may have changed:  Another medication with the same name was removed. Continue taking this medication, and follow the directions you see here.   Used for:  Intertrigo   Changed by:  Nga Goetz MD        Twice daily to groin rash until clear, then as needed.   Quantity:  30 g   Refills:  1       simvastatin 10 MG tablet   Commonly known as:  ZOCOR   This may have changed:  additional instructions   Used for:  Mixed hyperlipidemia   Changed by:  Miriam Bird MD        Dose:  10 mg   Take 1 tablet (10 mg) by mouth At Bedtime   Quantity:  90 tablet   Refills:  3       triamcinolone 0.1 % ointment   Commonly known as:  KENALOG   This may have changed:  Another medication with the same name was removed. Continue taking this medication, and follow the directions you see here.   Used for:  Dermatitis   Changed by:  Nga Goetz MD        To finger rash twice daily   Quantity:  80 g   Refills:  2            Where to get your medicines      These medications were sent to [facility]/pharmacy #[id number] - [place], [place] - [address]  [address]  [address] [facility] [address]     Phone:  [phone]     atenolol 50 MG tablet    lisinopril 10 MG tablet    simvastatin 10 MG tablet                Primary Care Provider Office Phone # Fax #    Miriam Bird [phone]       [address]   [facility] [address]        Equal Access to Services     BOB JANG AH: freida Vogel  ricoreese boris ramos ah. So [facility] [phone].    ATENCIÓN: Si nani blandon, tiene a sierra disposición servicios gratuitos de asistencia lingüística. Nela al [phone].    We comply with applicable federal civil rights laws and [place] laws. We do not discriminate on the basis of race, color, national origin, age, disability, sex, sexual orientation, or gender identity.            Thank you!     Thank you for choosing [facility]  for your care. Our goal is always to provide you with excellent care. Hearing back from our patients is one way we can continue to improve our services. Please take a few minutes to complete the written survey that you may receive in the mail after your visit with us. Thank you!             Your Updated Medication List - Protect others around you: Learn how to safely use, store and throw away your medicines at www.disposemymeds.org.          This list is accurate as of: 11/27/17 11:19 AM.  Always use your most recent med list.                   Brand Name Dispense Instructions for use Diagnosis    ASPIR-81 PO      None Entered        atenolol 50 MG tablet    TENORMIN    90 tablet    TAKE 1 TABLET (50 MG) BY MOUTH DAILY    Essential hypertension, benign       cholecalciferol 1000 UNITS capsule    vitamin  -D     Take 1 capsule by mouth daily. Patient takes (2) capsules daily.        clotrimazole 1 % cream    LOTRIMIN    60 g    Apply topically 2 times daily    Yeast infection of the skin       fish oil-omega-3 fatty acids 1000 MG capsule      Take 2 g by mouth daily. Patient takes 2-capsules daily.        furosemide 20 MG tablet    LASIX    90 tablet    Take 1 tablet (20 mg) by mouth daily    Essential hypertension, benign       ibuprofen 400 MG tablet    ADVIL/MOTRIN    30 tablet    Take 1 tablet (400 mg) by mouth every 6 hours as needed for moderate pain        lisinopril 10 MG tablet    PRINIVIL/ZESTRIL    90  tablet    TAKE 1 TABLET (10 MG) BY MOUTH DAILY    Essential hypertension, benign       mupirocin 2 % ointment    BACTROBAN    22 g    Use 2 times a day to nose    Dermatitis       nystatin ointment    MYCOSTATIN    30 g    Twice daily to groin rash until clear, then as needed.    Intertrigo       OCUVITE-LUTEIN PO      Take  by mouth. Patient takes 2 tabs daily.        simvastatin 10 MG tablet    ZOCOR    90 tablet    Take 1 tablet (10 mg) by mouth At Bedtime    Mixed hyperlipidemia       triamcinolone 0.1 % ointment    KENALOG    80 g    To finger rash twice daily    Dermatitis

## 2017-11-27 NOTE — PROGRESS NOTES
Screening Questionnaire for Adult Immunization    Are you sick today?   No   Do you have allergies to medications, food, a vaccine component or latex?   No   Have you ever had a serious reaction after receiving a vaccination?   No   Do you have a long-term health problem with heart disease, lung disease, asthma, kidney disease, metabolic disease (e.g. diabetes), anemia, or other blood disorder?   No   Do you have cancer, leukemia, HIV/AIDS, or any other immune system problem?   No   In the past 3 months, have you taken medications that affect  your immune system, such as prednisone, other steroids, or anticancer drugs; drugs for the treatment of rheumatoid arthritis, Crohn s disease, or psoriasis; or have you had radiation treatments?   No   Have you had a seizure, or a brain or other nervous system problem?   No   During the past year, have you received a transfusion of blood or blood     products, or been given immune (gamma) globulin or antiviral drug?   No   For women: Are you pregnant or is there a chance you could become        pregnant during the next month?   No   Have you received any vaccinations in the past 4 weeks?   No     Immunization questionnaire answers were all negative.        Per orders of Dr. Bird, injection of Tdap given by Lisa A. Magill. Patient instructed to remain in clinic for 15 minutes afterwards, and to report any adverse reaction to me immediately.       Screening performed by Lisa A. Magill on 11/27/2017 at 2:20 PM.

## 2017-11-27 NOTE — NURSING NOTE
"Chief Complaint   Patient presents with     Medicare Visit     Refill Request     Initial /70 (BP Location: Right arm, Patient Position: Chair, Cuff Size: Adult Regular)  Pulse 68  Temp 98.1  F (36.7  C) (Oral)  Resp 16  Ht 5' 2.75\" (1.594 m)  Wt 183 lb 14.4 oz (83.4 kg)  LMP 07/14/1966  SpO2 95%  BMI 32.84 kg/m2 Estimated body mass index is 32.84 kg/(m^2) as calculated from the following:    Height as of this encounter: 5' 2.75\" (1.594 m).    Weight as of this encounter: 183 lb 14.4 oz (83.4 kg).  BP completed using cuff size regular right arm.    Lisa Magill, CMA    "

## 2017-11-28 DIAGNOSIS — Z00.00 MEDICARE ANNUAL WELLNESS VISIT, SUBSEQUENT: ICD-10-CM

## 2017-11-28 LAB
ERYTHROCYTE [DISTWIDTH] IN BLOOD BY AUTOMATED COUNT: 13.6 % (ref 10–15)
HCT VFR BLD AUTO: 44 % (ref 35–47)
HGB BLD-MCNC: 14.3 G/DL (ref 11.7–15.7)
MCH RBC QN AUTO: 30.8 PG (ref 26.5–33)
MCHC RBC AUTO-ENTMCNC: 32.5 G/DL (ref 31.5–36.5)
MCV RBC AUTO: 95 FL (ref 78–100)
PLATELET # BLD AUTO: 163 10E9/L (ref 150–450)
RBC # BLD AUTO: 4.65 10E12/L (ref 3.8–5.2)
WBC # BLD AUTO: 6.2 10E9/L (ref 4–11)

## 2017-11-28 PROCEDURE — 36415 COLL VENOUS BLD VENIPUNCTURE: CPT | Performed by: FAMILY MEDICINE

## 2017-11-28 PROCEDURE — 85027 COMPLETE CBC AUTOMATED: CPT | Performed by: FAMILY MEDICINE

## 2017-11-28 PROCEDURE — 80061 LIPID PANEL: CPT | Performed by: FAMILY MEDICINE

## 2017-11-28 PROCEDURE — 80053 COMPREHEN METABOLIC PANEL: CPT | Performed by: FAMILY MEDICINE

## 2017-11-28 PROCEDURE — 84443 ASSAY THYROID STIM HORMONE: CPT | Performed by: FAMILY MEDICINE

## 2017-11-29 LAB
ALBUMIN SERPL-MCNC: 3.5 G/DL (ref 3.4–5)
ALP SERPL-CCNC: 116 U/L (ref 40–150)
ALT SERPL W P-5'-P-CCNC: 21 U/L (ref 0–50)
ANION GAP SERPL CALCULATED.3IONS-SCNC: 9 MMOL/L (ref 3–14)
AST SERPL W P-5'-P-CCNC: 22 U/L (ref 0–45)
BILIRUB SERPL-MCNC: 0.7 MG/DL (ref 0.2–1.3)
BUN SERPL-MCNC: 18 MG/DL (ref 7–30)
CALCIUM SERPL-MCNC: 8.7 MG/DL (ref 8.5–10.1)
CHLORIDE SERPL-SCNC: 105 MMOL/L (ref 94–109)
CHOLEST SERPL-MCNC: 166 MG/DL
CO2 SERPL-SCNC: 26 MMOL/L (ref 20–32)
CREAT SERPL-MCNC: 0.73 MG/DL (ref 0.52–1.04)
GFR SERPL CREATININE-BSD FRML MDRD: 76 ML/MIN/1.7M2
GLUCOSE SERPL-MCNC: 91 MG/DL (ref 70–99)
HDLC SERPL-MCNC: 49 MG/DL
LDLC SERPL CALC-MCNC: 93 MG/DL
NONHDLC SERPL-MCNC: 117 MG/DL
POTASSIUM SERPL-SCNC: 4.9 MMOL/L (ref 3.4–5.3)
PROT SERPL-MCNC: 7.2 G/DL (ref 6.8–8.8)
SODIUM SERPL-SCNC: 140 MMOL/L (ref 133–144)
TRIGL SERPL-MCNC: 119 MG/DL
TSH SERPL DL<=0.005 MIU/L-ACNC: 2.87 MU/L (ref 0.4–4)

## 2018-02-28 ENCOUNTER — OFFICE VISIT (OUTPATIENT)
Dept: DERMATOLOGY | Facility: CLINIC | Age: 83
End: 2018-02-28
Payer: MEDICARE

## 2018-02-28 VITALS — OXYGEN SATURATION: 96 % | SYSTOLIC BLOOD PRESSURE: 158 MMHG | DIASTOLIC BLOOD PRESSURE: 76 MMHG | HEART RATE: 67 BPM

## 2018-02-28 DIAGNOSIS — L57.0 AK (ACTINIC KERATOSIS): Primary | ICD-10-CM

## 2018-02-28 DIAGNOSIS — L57.8 SOLAR ELASTOSIS: ICD-10-CM

## 2018-02-28 DIAGNOSIS — C44.91 SUPERFICIAL BASAL CELL CARCINOMA: ICD-10-CM

## 2018-02-28 DIAGNOSIS — Z85.828 HISTORY OF SKIN CANCER: ICD-10-CM

## 2018-02-28 PROCEDURE — 99213 OFFICE O/P EST LOW 20 MIN: CPT | Mod: 25 | Performed by: PHYSICIAN ASSISTANT

## 2018-02-28 PROCEDURE — 17000 DESTRUCT PREMALG LESION: CPT | Performed by: PHYSICIAN ASSISTANT

## 2018-02-28 PROCEDURE — 17003 DESTRUCT PREMALG LES 2-14: CPT | Performed by: PHYSICIAN ASSISTANT

## 2018-02-28 NOTE — NURSING NOTE
"Chief Complaint   Patient presents with     Derm Problem     spot above eye follow up left eye       Initial /76  Pulse 67  LMP 06/02/1981  SpO2 96% Estimated body mass index is 32.84 kg/(m^2) as calculated from the following:    Height as of 11/27/17: 1.594 m (5' 2.75\").    Weight as of 11/27/17: 83.4 kg (183 lb 14.4 oz).  Medication Reconciliation: complete    Alesia Bynum MA  "

## 2018-02-28 NOTE — PATIENT INSTRUCTIONS
WOUND CARE INSTRUCTIONS  FOR CRYOSURGERY        This area treated with liquid nitrogen will form a blister. You do not need to bandage the area until after the blister forms and breaks (which may be a few days).  When the blister breaks, begin daily dressing changes as follows:    1) Clean and dry the area with tap water using clean Q-tip or sterile gauze pad.    2) Apply Polysporin ointment or Bacitracin ointment over entire wound.  Do NOT use Neosporin ointment.    3) Cover the wound with a band-aid or sterile non-stick gauze pad and micropore paper tape.      REPEAT THESE INSTRUCTIONS AT LEAST ONCE A DAY UNTIL THE WOUND HAS COMPLETELY HEALED.        It is an old wives tale that a wound heals better when it is exposed to air and allowed to dry out. The wound will heal faster with a better cosmetic result if it is kept moist with ointment and covered with a bandage.  Do not let the wound dry out.      IMPORTANT INFORMATION ON REVERSE SIDE    Supplies Needed:     *Cotton tipped applicators (Q-tips)   *Polysporin ointment or Bacitracin ointment (NOT NEOSPORIN)   *Band-aids, or non stick gauze pads and micropore paper tape                PATIENT INFORMATION    During the healing process you will notice a number of changes. All wounds develop a small halo of redness surrounding the wound.  This means healing is occurring. Severe itching with extensive redness usually indicates sensitivity to the ointment or bandage tape used to dress the wound.  You should call our office if this develops.      Swelling and/or discoloration around your surgical site is common, particularly when performed around the eye.    All wounds normally drain.  The larger the wound the more drainage there will be.  After 7-10 days, you will notice the wound beginning to shrink and new skin will begin to grow.  The wound is healed when you can see skin has formed over the entire area.  A healed wound has a healthy, shiny look to the surface and is  red to dark pink in color to normalize.  Wounds may take approximately 4-6 weeks to heal.  Larger wounds may take 6-8 weeks.  After the wound is healed you may discontinue dressing changes.    You may experience a sensation of tightness as your wound heals. This is normal and will gradually subside.    Your healed wound may be sensitive to temperature changes. This sensitivity improves with time, but if you re having a lot of discomfort, try to avoid temperature extremes.    Patients frequently experience itching after their wound appears to have healed because of the continue healing under the skin.  Plain Vaseline will help relieve the itching.

## 2018-02-28 NOTE — MR AVS SNAPSHOT
After Visit Summary   2/28/2018    Tanya Graham    MRN: [id number]           Patient Information     Date Of Birth          1/12/1931        Visit Information        Provider Department      2/28/2018 1:00 PM Gila Kinsey PA-C [facility]        Care Instructions    WOUND CARE INSTRUCTIONS  FOR CRYOSURGERY        This area treated with liquid nitrogen will form a blister. You do not need to bandage the area until after the blister forms and breaks (which may be a few days).  When the blister breaks, begin daily dressing changes as follows:    1) Clean and dry the area with tap water using clean Q-tip or sterile gauze pad.    2) Apply Polysporin ointment or Bacitracin ointment over entire wound.  Do NOT use Neosporin ointment.    3) Cover the wound with a band-aid or sterile non-stick gauze pad and micropore paper tape.      REPEAT THESE INSTRUCTIONS AT LEAST ONCE A DAY UNTIL THE WOUND HAS COMPLETELY HEALED.        It is an old wives tale that a wound heals better when it is exposed to air and allowed to dry out. The wound will heal faster with a better cosmetic result if it is kept moist with ointment and covered with a bandage.  Do not let the wound dry out.      IMPORTANT INFORMATION ON REVERSE SIDE    Supplies Needed:     *Cotton tipped applicators (Q-tips)   *Polysporin ointment or Bacitracin ointment (NOT NEOSPORIN)   *Band-aids, or non stick gauze pads and micropore paper tape                PATIENT INFORMATION    During the healing process you will notice a number of changes. All wounds develop a small halo of redness surrounding the wound.  This means healing is occurring. Severe itching with extensive redness usually indicates sensitivity to the ointment or bandage tape used to dress the wound.  You should call our office if this develops.      Swelling and/or discoloration around your surgical site is common, particularly when performed around the  eye.    All wounds normally drain.  The larger the wound the more drainage there will be.  After 7-10 days, you will notice the wound beginning to shrink and new skin will begin to grow.  The wound is healed when you can see skin has formed over the entire area.  A healed wound has a healthy, shiny look to the surface and is red to dark pink in color to normalize.  Wounds may take approximately 4-6 weeks to heal.  Larger wounds may take 6-8 weeks.  After the wound is healed you may discontinue dressing changes.    You may experience a sensation of tightness as your wound heals. This is normal and will gradually subside.    Your healed wound may be sensitive to temperature changes. This sensitivity improves with time, but if you re having a lot of discomfort, try to avoid temperature extremes.    Patients frequently experience itching after their wound appears to have healed because of the continue healing under the skin.  Plain Vaseline will help relieve the itching.                   Follow-ups after your visit        Who to contact     If you have questions or need follow up information about today's clinic visit or your schedule please contact [facility] directly at [phone].  Normal or non-critical lab and imaging results will be communicated to you by BlueCavahart, letter or phone within 4 business days after the clinic has received the results. If you do not hear from us within 7 days, please contact the clinic through Cinemacraftt or phone. If you have a critical or abnormal lab result, we will notify you by phone as soon as possible.  Submit refill requests through GreenCloud or call your pharmacy and they will forward the refill request to us. Please allow 3 business days for your refill to be completed.          Additional Information About Your Visit        GreenCloud Information     GreenCloud lets you send messages to your doctor, view your test results, renew your prescriptions, schedule  "appointments and more. To sign up, go to [email or web address] . Click on \"Log in\" on the left side of the screen, which will take you to the Welcome page. Then click on \"Sign up Now\" on the right side of the page.     You will be asked to enter the access code listed below, as well as some personal information. Please follow the directions to create your username and password.     Your access code is: [email or web address]  Expires: 2018  1:14 PM     Your access code will  in 90 days. If you need help or a new code, please call your [facility] clinic or [phone].        Care EveryWhere ID     This is your Care EveryWhere ID. This could be used by other organizations to access your [facility] medical records  [id number]        Your Vitals Were     Pulse Last Period Pulse Oximetry             67 1981 96%          Blood Pressure from Last 3 Encounters:   18 158/76   17 136/70   17 138/76    Weight from Last 3 Encounters:   17 83.4 kg (183 lb 14.4 oz)   17 84.3 kg (185 lb 14.4 oz)   10/20/16 85.7 kg (189 lb)              Today, you had the following     No orders found for display       Primary Care Provider Office Phone # Fax #    Miriam Bird [phone]         [facility]   [facility][1] [id number]        Equal Access to Services     [facility] AH: Hadii aad ku hadasho Soomaali, waaxda luqadaha, qaybta kaalmada adeegyada, waxay abby robison . So [facility] [phone].    ATENCIÓN: Si habla josé miguelañol, tiene a sierra disposición servicios gratuitos de asistencia lingüística. Llame al [phone].    We comply with applicable federal civil rights laws and [place] laws. We do not discriminate on the basis of race, color, national origin, age, disability, sex, sexual orientation, or gender identity.            Thank you!     Thank you for choosing [facility]  for your care. Our goal is always to provide you " with excellent care. Hearing back from our patients is one way we can continue to improve our services. Please take a few minutes to complete the written survey that you may receive in the mail after your visit with us. Thank you!             Your Updated Medication List - Protect others around you: Learn how to safely use, store and throw away your medicines at www.disposemymeds.org.          This list is accurate as of 2/28/18  1:14 PM.  Always use your most recent med list.                   Brand Name Dispense Instructions for use Diagnosis    ASPIR-81 PO      None Entered        atenolol 50 MG tablet    TENORMIN    90 tablet    TAKE 1 TABLET (50 MG) BY MOUTH DAILY    Essential hypertension, benign       cholecalciferol 1000 UNITS capsule    vitamin  -D     Take 1 capsule by mouth daily. Patient takes (2) capsules daily.        clotrimazole 1 % cream    LOTRIMIN    60 g    Apply topically 2 times daily    Yeast infection of the skin       fish oil-omega-3 fatty acids 1000 MG capsule      Take 2 g by mouth daily. Patient takes 2-capsules daily.        furosemide 20 MG tablet    LASIX    90 tablet    Take 1 tablet (20 mg) by mouth daily    Essential hypertension, benign       ibuprofen 400 MG tablet    ADVIL/MOTRIN    30 tablet    Take 1 tablet (400 mg) by mouth every 6 hours as needed for moderate pain        lisinopril 10 MG tablet    PRINIVIL/ZESTRIL    90 tablet    TAKE 1 TABLET (10 MG) BY MOUTH DAILY    Essential hypertension, benign       mupirocin 2 % ointment    BACTROBAN    22 g    Use 2 times a day to nose    Dermatitis       nystatin ointment    MYCOSTATIN    30 g    Twice daily to groin rash until clear, then as needed.    Intertrigo       OCUVITE-LUTEIN PO      Take  by mouth. Patient takes 2 tabs daily.        simvastatin 10 MG tablet    ZOCOR    90 tablet    Take 1 tablet (10 mg) by mouth At Bedtime    Mixed hyperlipidemia       triamcinolone 0.1 % ointment    KENALOG    80 g    To finger rash twice  daily    Dermatitis

## 2018-02-28 NOTE — PROGRESS NOTES
HPI:  Tanya Graham is a 87 year old year old female patient here today for 6 month follow up of superficial basal cell of left medial canthus.   Duration: years  Symptoms:  Thinks it is recurring     Previous treatments: biopsy     Alleviating/aggravating factors: none    Associated symptoms: none  Additional findings:none  Patient has no other skin complaints today.  Remainder of the HPI, Meds, PMH, Allergies, FH, and SH was reviewed in chart.    Pertinent Hx:   BCC  Past Medical History:   Diagnosis Date     Benign neoplasm of colon 4/03, 8/07    polyps, 2 and 3     Diverticulosis of colon (without mention of hemorrhage) 8/07    noted on screening colonoscopy     Essential hypertension, benign     Hypertension, Benign     Malignant neoplasm of breast (female), unspecified site 3/07    infiltrating ductal CA left breast     Mixed hyperlipidemia     Hyperlipidemia     NONSPECIFIC MEDICAL HISTORY 1971    left carotid aneurysm, surgical repair     NONSPECIFIC MEDICAL HISTORY 12/04, 2/07    osteopenia '04, nl DEXA '07     NONSPECIFIC MEDICAL HISTORY 4/06    ventricular ectopy; nl stress echo     Other malignant neoplasm of other specified sites of skin     skin cancer removed     Other ventral hernia without mention of obstruction or gangrene     surgical repair     Postmastectomy lymphedema syndrome 2007     Unspecified intestinal obstruction 7/07    partial small bowel obstruction into ventral hernia       Past Surgical History:   Procedure Laterality Date     BIOPSY OF BREAST, NEEDLE CORE  3/07    left breast, infiltrating ductal CA     BREAST LUMPECTOMY, RT/LT  4/07    left breast with sentienl node bx     HYSTERECTOMY, RICH      RICH/BSO-due to prolapsed bladder?     LAP VENTRAL HERNIA REPAIR  5/07    ventral herniorrhaphy     SURGICAL HISTORY OF -   1971    left carotid aneurysm     SURGICAL HISTORY OF -   2006    bilat cataract     SURGICAL HISTORY OF -   2000    bladder repair     SURGICAL HISTORY OF -   2002     ventral hernia repair        Family History   Problem Relation Age of Onset     C.A.D. Mother      CANCER Father      unknown primary cancer     CANCER Brother      prostate     DIABETES Daughter      Cardiovascular Daughter      MI age 37,hx heart transplant     GASTROINTESTINAL DISEASE Daughter      celiac sprue     Connective Tissue Disorder Daughter      fibromyalgia       Social History     Social History     Marital status:      Spouse name: Hollis     Number of children: 2     Years of education: N/A     Occupational History      Retired     Social History Main Topics     Smoking status: Never Smoker     Smokeless tobacco: Never Used     Alcohol use No     Drug use: No     Sexual activity: Yes     Partners: Male      Comment: postmenopausal     Other Topics Concern     Not on file     Social History Narrative       Outpatient Encounter Prescriptions as of 2/28/2018   Medication Sig Dispense Refill     simvastatin (ZOCOR) 10 MG tablet Take 1 tablet (10 mg) by mouth At Bedtime 90 tablet 3     lisinopril (PRINIVIL/ZESTRIL) 10 MG tablet TAKE 1 TABLET (10 MG) BY MOUTH DAILY 90 tablet 1     atenolol (TENORMIN) 50 MG tablet TAKE 1 TABLET (50 MG) BY MOUTH DAILY 90 tablet 1     mupirocin (BACTROBAN) 2 % ointment Use 2 times a day to nose 22 g 1     triamcinolone (KENALOG) 0.1 % ointment To finger rash twice daily 80 g 2     nystatin (MYCOSTATIN) ointment Twice daily to groin rash until clear, then as needed. 30 g 1     clotrimazole (LOTRIMIN) 1 % cream Apply topically 2 times daily 60 g 4     furosemide (LASIX) 20 MG tablet Take 1 tablet (20 mg) by mouth daily 90 tablet 3     ibuprofen (ADVIL,MOTRIN) 400 MG tablet Take 1 tablet (400 mg) by mouth every 6 hours as needed for moderate pain 30 tablet 0     fish oil-omega-3 fatty acids (FISH OIL) 1000 MG capsule Take 2 g by mouth daily. Patient takes 2-capsules daily.       Multiple Vitamins-Minerals (OCUVITE-LUTEIN PO) Take  by mouth. Patient takes 2 tabs daily.        cholecalciferol (VITAMIN-D) 1000 UNIT capsule Take 1 capsule by mouth daily. Patient takes (2) capsules daily.       ASPIR-81 OR None Entered       No facility-administered encounter medications on file as of 2/28/2018.        Review Of Systems:  Skin: As above  Eyes: negative  Ears/Nose/Throat: negative  Respiratory: No shortness of breath, dyspnea on exertion, cough, or hemoptysis  Cardiovascular: negative  Gastrointestinal: negative  Genitourinary: negative  Musculoskeletal: negative  Neurologic: negative  Psychiatric: negative  Hematologic/Lymphatic/Immunologic: negative  Endocrine: negative      Objective:     /76  Pulse 67  LMP 06/02/1981  SpO2 96%  Eyes: Conjunctivae/lids: Normal   ENT: Lips:  Normal  MSK: Normal  Pulm: Breathing Normal  Neuro/Psych: Orientation: Normal; Mood/Affect: Normal, NAD, WDWN  Following areas examined: Face, neck and ears  Findings:    1) Pink scaly papule on left medial canthus 0.4cm  2) Red scaly papule on medial aspect of right nare  3) Pink scaly macule on forehead    Assessment and Plan:  1) Superficial basal cell carcinoma of left medial canthus 0.4cm  Biopsied 8/29/17, recurring. Will set up mohs with Dr. Richard.   2) actinic keratosis  Disc biopsy. Pt defers, would like area treated with cryo. Follow up in 2 months if area has not completely resolved.   LN2 for 5 seconds x 2. Discussed AE include hypopigmentation (white spot) and recurrence. Follow up in 2-3 months to recheck lesions. There is a 0.025%-20% chance that AKs can develop into a SCC.   Discussed treating with LN2   3) Actinic keratosis  LN2 for 5 seconds x 2. Discussed AE include hypopigmentation (white spot) and recurrence. Follow up in 2-3 months to recheck lesions. There is a 0.025%-20% chance that AKs can develop into a SCC.   Discussed treating with LN2.  4) Solar elastosis  Importance of sunscreen daily.  5) History of skin cancer  Signs and Symptoms of non-melanoma skin cancer and ABCDEs of  melanoma reviewed with patient. Patient encouraged to perform monthly self skin exams including checking nails for any changes. UV precautions reviewed with patient.     Follow up for mohs

## 2018-02-28 NOTE — LETTER
2/28/2018         RE: Tanya Graham  [address]  [facility] [address]        Dear Colleague,    Thank you for referring your patient, Tanya Graham, to the [facility]. Please see a copy of my visit note below.    HPI:  Tanya Graham is a 87 year old year old female patient here today for 6 month follow up of superficial basal cell of left medial canthus.   Duration: years  Symptoms:  Thinks it is recurring     Previous treatments: biopsy     Alleviating/aggravating factors: none    Associated symptoms: none  Additional findings:none  Patient has no other skin complaints today.  Remainder of the HPI, Meds, PMH, Allergies, FH, and SH was reviewed in chart.    Pertinent Hx:   BCC  Past Medical History:   Diagnosis Date     Benign neoplasm of colon 4/03, 8/07    polyps, 2 and 3     Diverticulosis of colon (without mention of hemorrhage) 8/07    noted on screening colonoscopy     Essential hypertension, benign     Hypertension, Benign     Malignant neoplasm of breast (female), unspecified site 3/07    infiltrating ductal CA left breast     Mixed hyperlipidemia     Hyperlipidemia     NONSPECIFIC MEDICAL HISTORY 1971    left carotid aneurysm, surgical repair     NONSPECIFIC MEDICAL HISTORY 12/04, 2/07    osteopenia '04, nl DEXA '07     NONSPECIFIC MEDICAL HISTORY 4/06    ventricular ectopy; nl stress echo     Other malignant neoplasm of other specified sites of skin     skin cancer removed     Other ventral hernia without mention of obstruction or gangrene     surgical repair     Postmastectomy lymphedema syndrome 2007     Unspecified intestinal obstruction 7/07    partial small bowel obstruction into ventral hernia       Past Surgical History:   Procedure Laterality Date     BIOPSY OF BREAST, NEEDLE CORE  3/07    left breast, infiltrating ductal CA     BREAST LUMPECTOMY, RT/LT  4/07    left breast with sentienl node bx     HYSTERECTOMY, RICH      RICH/BSO-due to prolapsed bladder?      LAP VENTRAL HERNIA REPAIR  5/07    ventral herniorrhaphy     SURGICAL HISTORY OF -   1971    left carotid aneurysm     SURGICAL HISTORY OF -   2006    bilat cataract     SURGICAL HISTORY OF -   2000    bladder repair     SURGICAL HISTORY OF -   2002    ventral hernia repair        Family History   Problem Relation Age of Onset     C.A.D. Mother      CANCER Father      unknown primary cancer     CANCER Brother      prostate     DIABETES Daughter      Cardiovascular Daughter      MI age 37,hx heart transplant     GASTROINTESTINAL DISEASE Daughter      celiac sprue     Connective Tissue Disorder Daughter      fibromyalgia       Social History     Social History     Marital status:      Spouse name: Hollis     Number of children: 2     Years of education: N/A     Occupational History      Retired     Social History Main Topics     Smoking status: Never Smoker     Smokeless tobacco: Never Used     Alcohol use No     Drug use: No     Sexual activity: Yes     Partners: Male      Comment: postmenopausal     Other Topics Concern     Not on file     Social History Narrative       Outpatient Encounter Prescriptions as of 2/28/2018   Medication Sig Dispense Refill     simvastatin (ZOCOR) 10 MG tablet Take 1 tablet (10 mg) by mouth At Bedtime 90 tablet 3     lisinopril (PRINIVIL/ZESTRIL) 10 MG tablet TAKE 1 TABLET (10 MG) BY MOUTH DAILY 90 tablet 1     atenolol (TENORMIN) 50 MG tablet TAKE 1 TABLET (50 MG) BY MOUTH DAILY 90 tablet 1     mupirocin (BACTROBAN) 2 % ointment Use 2 times a day to nose 22 g 1     triamcinolone (KENALOG) 0.1 % ointment To finger rash twice daily 80 g 2     nystatin (MYCOSTATIN) ointment Twice daily to groin rash until clear, then as needed. 30 g 1     clotrimazole (LOTRIMIN) 1 % cream Apply topically 2 times daily 60 g 4     furosemide (LASIX) 20 MG tablet Take 1 tablet (20 mg) by mouth daily 90 tablet 3     ibuprofen (ADVIL,MOTRIN) 400 MG tablet Take 1 tablet (400 mg) by mouth every 6 hours as  needed for moderate pain 30 tablet 0     fish oil-omega-3 fatty acids (FISH OIL) 1000 MG capsule Take 2 g by mouth daily. Patient takes 2-capsules daily.       Multiple Vitamins-Minerals (OCUVITE-LUTEIN PO) Take  by mouth. Patient takes 2 tabs daily.       cholecalciferol (VITAMIN-D) 1000 UNIT capsule Take 1 capsule by mouth daily. Patient takes (2) capsules daily.       ASPIR-81 OR None Entered       No facility-administered encounter medications on file as of 2/28/2018.        Review Of Systems:  Skin: As above  Eyes: negative  Ears/Nose/Throat: negative  Respiratory: No shortness of breath, dyspnea on exertion, cough, or hemoptysis  Cardiovascular: negative  Gastrointestinal: negative  Genitourinary: negative  Musculoskeletal: negative  Neurologic: negative  Psychiatric: negative  Hematologic/Lymphatic/Immunologic: negative  Endocrine: negative      Objective:     /76  Pulse 67  LMP 06/02/1981  SpO2 96%  Eyes: Conjunctivae/lids: Normal   ENT: Lips:  Normal  MSK: Normal  Pulm: Breathing Normal  Neuro/Psych: Orientation: Normal; Mood/Affect: Normal, NAD, WDWN  Following areas examined: Face, neck and ears  Findings:    1) Pink scaly papule on left medial canthus 0.4cm  2) Red scaly papule on medial aspect of right nare  3) Pink scaly macule on forehead    Assessment and Plan:  1) Superficial basal cell carcinoma of left medial canthus 0.4cm  Biopsied 8/29/17, recurring. Will set up mohs with Dr. Richard.   2) actinic keratosis  Disc biopsy. Pt defers, would like area treated with cryo. Follow up in 2 months if area has not completely resolved.   LN2 for 5 seconds x 2. Discussed AE include hypopigmentation (white spot) and recurrence. Follow up in 2-3 months to recheck lesions. There is a 0.025%-20% chance that AKs can develop into a SCC.   Discussed treating with LN2   3) Actinic keratosis  LN2 for 5 seconds x 2. Discussed AE include hypopigmentation (white spot) and recurrence. Follow up in 2-3 months to  recheck lesions. There is a 0.025%-20% chance that AKs can develop into a SCC.   Discussed treating with LN2.  4) Solar elastosis  Importance of sunscreen daily.  5) History of skin cancer  Signs and Symptoms of non-melanoma skin cancer and ABCDEs of melanoma reviewed with patient. Patient encouraged to perform monthly self skin exams including checking nails for any changes. UV precautions reviewed with patient.     Follow up for mohs                  Again, thank you for allowing me to participate in the care of your patient.        Sincerely,        Gila Kinsey PA-C

## 2018-03-01 ENCOUNTER — TELEPHONE (OUTPATIENT)
Dept: DERMATOLOGY | Facility: CLINIC | Age: 83
End: 2018-03-01

## 2018-03-01 NOTE — LETTER
[facility]  [address]  [address]  [phone]        3/1/2018       Tanya Graham  [address] [facility] [address]      Dear Tanya:    You are scheduled for Mohs Surgery on: Thursday April 19 th 2018 at 7:45 am    Please check in at [facility], [address].     You don't need to arrive more than 5-10 minutes prior to your appointment time.     Be sure to eat a good breakfast and bathe and wash your hair prior to surgery.     If you are taking any anti-coagulants that are prescribed by your Doctor (such as Coumadin/Warfarin, Plavix, Aspirin, Ibuprofen), please continue taking them.     However, if you are taking anti-coagulants over the counter without a Doctor's order for a medical condition, please discontinue them 10 days prior to surgery.     Please wear loose comfortable clothing as it could possibly be 4-6 hours until your surgery is completed depending upon how many layers of tissue need to be removed.      Thank you,    OLU Richard MD

## 2018-03-01 NOTE — TELEPHONE ENCOUNTER
patient schedule with MOHS/letter mailed.    Nadira HAGAN RN  [facility]  [phone]  [facility]   [phone]

## 2018-03-01 NOTE — TELEPHONE ENCOUNTER
Please call pt and set up mohs for removal:    Superficial basal cell on left medial canthus.    james goodwin

## 2018-04-19 ENCOUNTER — OFFICE VISIT (OUTPATIENT)
Dept: DERMATOLOGY | Facility: CLINIC | Age: 83
End: 2018-04-19
Payer: MEDICARE

## 2018-04-19 VITALS — DIASTOLIC BLOOD PRESSURE: 75 MMHG | OXYGEN SATURATION: 92 % | SYSTOLIC BLOOD PRESSURE: 169 MMHG | HEART RATE: 68 BPM

## 2018-04-19 DIAGNOSIS — C44.111: Primary | ICD-10-CM

## 2018-04-19 DIAGNOSIS — C44.311: ICD-10-CM

## 2018-04-19 PROCEDURE — 17311 MOHS 1 STAGE H/N/HF/G: CPT | Mod: 51 | Performed by: DERMATOLOGY

## 2018-04-19 PROCEDURE — 17311 MOHS 1 STAGE H/N/HF/G: CPT | Mod: 59 | Performed by: DERMATOLOGY

## 2018-04-19 PROCEDURE — 13152 CMPLX RPR E/N/E/L 2.6-7.5 CM: CPT | Mod: 59 | Performed by: DERMATOLOGY

## 2018-04-19 PROCEDURE — 15260 FTH/GFT FR N/E/E/L 20 SQCM/<: CPT | Performed by: DERMATOLOGY

## 2018-04-19 PROCEDURE — 88331 PATH CONSLTJ SURG 1 BLK 1SPC: CPT | Mod: 59 | Performed by: DERMATOLOGY

## 2018-04-19 PROCEDURE — 11100 ZZHC BIOPSY SKIN/SUBQ/MUC MEM, SINGLE LESION: CPT | Mod: 59 | Performed by: DERMATOLOGY

## 2018-04-19 NOTE — PROGRESS NOTES
Tanya Graham is a 87 year old year old female patient here today for evaluation and managment of basal cell carcinoma on left medial canthus, she states this was excised in the past. .  Today she notes a tender spot on right nostril,  Patient states this has been present for a while.  Patient reports the following symptoms:  bled.  Patient reports the following previous treatments none.  Patient reports the following modifying factors none.  Associated symptoms: none.  Patient has no other skin complaints today.  Remainder of the HPI, Meds, PMH, Allergies, FH, and SH was reviewed in chart.    Pertinent Hx:   Non-melanoma skin cancer   Past Medical History:   Diagnosis Date     Basal cell carcinoma      Benign neoplasm of colon 4/03, 8/07    polyps, 2 and 3     Diverticulosis of colon (without mention of hemorrhage) 8/07    noted on screening colonoscopy     Essential hypertension, benign     Hypertension, Benign     Malignant neoplasm of breast (female), unspecified site 3/07    infiltrating ductal CA left breast     Mixed hyperlipidemia     Hyperlipidemia     NONSPECIFIC MEDICAL HISTORY 1971    left carotid aneurysm, surgical repair     NONSPECIFIC MEDICAL HISTORY 12/04, 2/07    osteopenia '04, nl DEXA '07     NONSPECIFIC MEDICAL HISTORY 4/06    ventricular ectopy; nl stress echo     Other malignant neoplasm of other specified sites of skin     skin cancer removed     Other ventral hernia without mention of obstruction or gangrene     surgical repair     Postmastectomy lymphedema syndrome 2007     Unspecified intestinal obstruction 7/07    partial small bowel obstruction into ventral hernia       Past Surgical History:   Procedure Laterality Date     BIOPSY OF BREAST, NEEDLE CORE  3/07    left breast, infiltrating ductal CA     BREAST LUMPECTOMY, RT/LT  4/07    left breast with sentienl node bx     HYSTERECTOMY, RICH      RICH/BSO-due to prolapsed bladder?     LAP VENTRAL HERNIA REPAIR  5/07    ventral herniorrhaphy      SURGICAL HISTORY OF -   1971    left carotid aneurysm     SURGICAL HISTORY OF -   2006    bilat cataract     SURGICAL HISTORY OF -   2000    bladder repair     SURGICAL HISTORY OF -   2002    ventral hernia repair        Family History   Problem Relation Age of Onset     C.A.D. Mother      CANCER Father      unknown primary cancer     CANCER Brother      prostate     DIABETES Daughter      Cardiovascular Daughter      MI age 37,hx heart transplant     GASTROINTESTINAL DISEASE Daughter      celiac sprue     Connective Tissue Disorder Daughter      fibromyalgia       Social History     Social History     Marital status:      Spouse name: Hollis     Number of children: 2     Years of education: N/A     Occupational History      Retired     Social History Main Topics     Smoking status: Never Smoker     Smokeless tobacco: Never Used     Alcohol use No     Drug use: No     Sexual activity: Yes     Partners: Male      Comment: postmenopausal     Other Topics Concern     Not on file     Social History Narrative       Outpatient Encounter Prescriptions as of 4/19/2018   Medication Sig Dispense Refill     ASPIR-81 OR None Entered       atenolol (TENORMIN) 50 MG tablet TAKE 1 TABLET (50 MG) BY MOUTH DAILY 90 tablet 1     cholecalciferol (VITAMIN-D) 1000 UNIT capsule Take 1 capsule by mouth daily. Patient takes (2) capsules daily.       clotrimazole (LOTRIMIN) 1 % cream Apply topically 2 times daily 60 g 4     fish oil-omega-3 fatty acids (FISH OIL) 1000 MG capsule Take 2 g by mouth daily. Patient takes 2-capsules daily.       furosemide (LASIX) 20 MG tablet Take 1 tablet (20 mg) by mouth daily 90 tablet 3     ibuprofen (ADVIL,MOTRIN) 400 MG tablet Take 1 tablet (400 mg) by mouth every 6 hours as needed for moderate pain 30 tablet 0     lisinopril (PRINIVIL/ZESTRIL) 10 MG tablet TAKE 1 TABLET (10 MG) BY MOUTH DAILY 90 tablet 1     Multiple Vitamins-Minerals (OCUVITE-LUTEIN PO) Take  by mouth. Patient takes 2 tabs  daily.       mupirocin (BACTROBAN) 2 % ointment Use 2 times a day to nose 22 g 1     nystatin (MYCOSTATIN) ointment Twice daily to groin rash until clear, then as needed. 30 g 1     simvastatin (ZOCOR) 10 MG tablet Take 1 tablet (10 mg) by mouth At Bedtime 90 tablet 3     triamcinolone (KENALOG) 0.1 % ointment To finger rash twice daily 80 g 2     No facility-administered encounter medications on file as of 4/19/2018.              Review Of Systems  Skin: As above  Eyes: negative  Ears/Nose/Throat: negative  Respiratory: No shortness of breath, dyspnea on exertion, cough, or hemoptysis  Cardiovascular: negative  Gastrointestinal: negative  Genitourinary: negative  Musculoskeletal: negative  Neurologic: negative  Psychiatric: negative  Hematologic/Lymphatic/Immunologic: negative  Endocrine: negative      O:   NAD, WDWN, Alert & Oriented, Mood & Affect wnl, Vitals stable   Here today alone   /75  Pulse 68  LMP 06/02/1981  SpO2 92%  Breastfeeding? No   General appearance normal   Vitals stable   Alert, oriented and in no acute distress     L medial canthus 4mm pink pearly papule    R nostril 4m pink pearly papule       The remainder of expanded problem focused exam was unremarkable; the following areas were examined:  scalp/hair, conjunctiva/lids, face, neck, lips      Eyes: Conjunctivae/lids:Normal     ENT: Lips, buccal mucosa, tongue: normal    MSK:Normal    Cardiovascular: peripheral edema none    Pulm: Breathing Normal    Lymph Nodes: No Head and Neck Lymphadenopathy     Neuro/Psych: Orientation:Normal; Mood/Affect:Normal      MICRO:   R nostril:Orthokeratosis of epidermis with a proliferation of nests of basaloid cells, with peripheral palisading and a haphazard arrangement in the center extending into the dermis, forming nodules.  The tumor cells have hyperchromatic nuclei. Poor cytoplasm and intercellular bridging.    A/P:  1. R nostril r/o basal cell carcinoma   TANGENTIAL BIOPSY IN HOUSE:  After  consent, anesthesia with LEC and prep, tangential excision performed and dx above confirmed with frozen section histology.  No complications and routine wound care.  Patient told result basal cell carcinoma   2. L medial canhtus rec basal cell carcinoma   .      BENIGN LESIONS DISCUSSED WITH PATIENT:  I discussed the specifics of tumor, prognosis, and genetics of benign lesions.  I explained that treatment of these lesions would be purely cosmetic and not medically neccessary.  I discussed with patient different removal options including excision, cautery and /or laser.      Nature and genetics of benign skin lesions dicussed with patient.  Signs and Symptoms of skin cancer discussed with patient.  Patient to follow up with Primary Care provider regarding elevated blood pressure.  Patient encouraged to perform monthly skin exams.  UV precautions reviewed with patient.  Patient to follow up with Primary Care provider regarding elevated blood pressure.  Skin care regimen reviewed with patient: Eliminate harsh soaps, i.e. Dial, zest, irsih spring; Mild soaps such as Cetaphil or Dove sensitive skin, avoid hot or cold showers, aggressive use of emollients including vanicream, cetaphil or cerave discussed with patient.    Risks of non-melanoma skin cancer discussed with patient   Return to clinic 6 months    PROCEDURE NOTE  R nostril basal cell carcinoma   MOHS:   Location    After PGACAC discussed with patient, decision for Mohs surgery was made. Indication for Mohs was Location. Patient confirmed the site with Dr. Richard.  After anesthesia with LEC, the tumor was excised using standard Mohs technique in 1 stages(s).  CLEAR MARGINS OBTAINED and Final defect size was 0.9 cm.       REPAIR SECOND INTENT: We discussed the options for wound management in full with the patient including risks/benefits/possible outcomes. Decision made to allow the wound to heal by second intention. EBL minimal; complications none; wound care  routine.  The patient was discharged in good condition and will return in one month or prn for wound evaluation.      L medial canthus basal cell carcinoma   MOHS:   Location, recurrent    After PGACAC discussed with patient, decision for Mohs surgery was made. Indication for Mohs was Recurrent. Patient confirmed the site with Dr. Richard.  After anesthesia with LEC, the tumor was excised using standard Mohs technique in 1 stages(s).  CLEAR MARGINS OBTAINED and Final defect size was 1 cm.       REPAIR COMPLEX: Because of the tightness of the surrounding skin and Because of the size and full thickness nature of the defect, a complex closure was planned. After LEC anesthesia and prep, Burow's triangles were excised in the relaxed skin tension lines. The wound edges were widely undermined by dissection in the subcutaneous plane until adequate tissue mobility was obtained. Hemostasis was obtained. The wound edges were closed in a layered fashion using Vicryl and Fast Absorbing Plain Gut sutures. Postoperative length was 3 cm.       This left a defects on medial canthus lid margin.    REPAIR FTSG FROM OTHER: Because of the full-thickness nature of the defect and to avoid distortion, a full-thickness skin graft was planned. After LEC anesthesia and prep, a template was made of the defect and the graft was harvested from the buttows triangle.  . The graft was defatted and trimmed to fit the defect. It was sutured into place with Fast Absorbing Plain Gut suture and a taped Bolster dressing was applied.    EBL minimal; complications none; wound care routine.  The patient was discharged in good condition and will return on one week for wound evaluation.

## 2018-04-19 NOTE — LETTER
4/19/2018         RE: Tanya Graham  [id number]  [facility] [address]        Dear Colleague,    Thank you for referring your patient, Tanya Graham, to the [facility]. Please see a copy of my visit note below.    Tanya Graham is a 87 year old year old female patient here today for evaluation and managment of basal cell carcinoma on left medial canthus, she states this was excised in the past. .  Today she notes a tender spot on right nostril,  Patient states this has been present for a while.  Patient reports the following symptoms:  bled.  Patient reports the following previous treatments none.  Patient reports the following modifying factors none.  Associated symptoms: none.  Patient has no other skin complaints today.  Remainder of the HPI, Meds, PMH, Allergies, FH, and SH was reviewed in chart.    Pertinent Hx:   Non-melanoma skin cancer   Past Medical History:   Diagnosis Date     Basal cell carcinoma      Benign neoplasm of colon 4/03, 8/07    polyps, 2 and 3     Diverticulosis of colon (without mention of hemorrhage) 8/07    noted on screening colonoscopy     Essential hypertension, benign     Hypertension, Benign     Malignant neoplasm of breast (female), unspecified site 3/07    infiltrating ductal CA left breast     Mixed hyperlipidemia     Hyperlipidemia     NONSPECIFIC MEDICAL HISTORY 1971    left carotid aneurysm, surgical repair     NONSPECIFIC MEDICAL HISTORY 12/04, 2/07    osteopenia '04, nl DEXA '07     NONSPECIFIC MEDICAL HISTORY 4/06    ventricular ectopy; nl stress echo     Other malignant neoplasm of other specified sites of skin     skin cancer removed     Other ventral hernia without mention of obstruction or gangrene     surgical repair     Postmastectomy lymphedema syndrome 2007     Unspecified intestinal obstruction 7/07    partial small bowel obstruction into ventral hernia       Past Surgical History:   Procedure Laterality Date     BIOPSY OF BREAST,  NEEDLE CORE  3/07    left breast, infiltrating ductal CA     BREAST LUMPECTOMY, RT/LT  4/07    left breast with sentienl node bx     HYSTERECTOMY, RICH      RICH/BSO-due to prolapsed bladder?     LAP VENTRAL HERNIA REPAIR  5/07    ventral herniorrhaphy     SURGICAL HISTORY OF -   1971    left carotid aneurysm     SURGICAL HISTORY OF -   2006    bilat cataract     SURGICAL HISTORY OF -   2000    bladder repair     SURGICAL HISTORY OF -   2002    ventral hernia repair        Family History   Problem Relation Age of Onset     C.A.D. Mother      CANCER Father      unknown primary cancer     CANCER Brother      prostate     DIABETES Daughter      Cardiovascular Daughter      MI age 37,hx heart transplant     GASTROINTESTINAL DISEASE Daughter      celiac sprue     Connective Tissue Disorder Daughter      fibromyalgia       Social History     Social History     Marital status:      Spouse name: Hollis     Number of children: 2     Years of education: N/A     Occupational History      Retired     Social History Main Topics     Smoking status: Never Smoker     Smokeless tobacco: Never Used     Alcohol use No     Drug use: No     Sexual activity: Yes     Partners: Male      Comment: postmenopausal     Other Topics Concern     Not on file     Social History Narrative       Outpatient Encounter Prescriptions as of 4/19/2018   Medication Sig Dispense Refill     ASPIR-81 OR None Entered       atenolol (TENORMIN) 50 MG tablet TAKE 1 TABLET (50 MG) BY MOUTH DAILY 90 tablet 1     cholecalciferol (VITAMIN-D) 1000 UNIT capsule Take 1 capsule by mouth daily. Patient takes (2) capsules daily.       clotrimazole (LOTRIMIN) 1 % cream Apply topically 2 times daily 60 g 4     fish oil-omega-3 fatty acids (FISH OIL) 1000 MG capsule Take 2 g by mouth daily. Patient takes 2-capsules daily.       furosemide (LASIX) 20 MG tablet Take 1 tablet (20 mg) by mouth daily 90 tablet 3     ibuprofen (ADVIL,MOTRIN) 400 MG tablet Take 1 tablet (400  mg) by mouth every 6 hours as needed for moderate pain 30 tablet 0     lisinopril (PRINIVIL/ZESTRIL) 10 MG tablet TAKE 1 TABLET (10 MG) BY MOUTH DAILY 90 tablet 1     Multiple Vitamins-Minerals (OCUVITE-LUTEIN PO) Take  by mouth. Patient takes 2 tabs daily.       mupirocin (BACTROBAN) 2 % ointment Use 2 times a day to nose 22 g 1     nystatin (MYCOSTATIN) ointment Twice daily to groin rash until clear, then as needed. 30 g 1     simvastatin (ZOCOR) 10 MG tablet Take 1 tablet (10 mg) by mouth At Bedtime 90 tablet 3     triamcinolone (KENALOG) 0.1 % ointment To finger rash twice daily 80 g 2     No facility-administered encounter medications on file as of 4/19/2018.              Review Of Systems  Skin: As above  Eyes: negative  Ears/Nose/Throat: negative  Respiratory: No shortness of breath, dyspnea on exertion, cough, or hemoptysis  Cardiovascular: negative  Gastrointestinal: negative  Genitourinary: negative  Musculoskeletal: negative  Neurologic: negative  Psychiatric: negative  Hematologic/Lymphatic/Immunologic: negative  Endocrine: negative      O:   NAD, WDWN, Alert & Oriented, Mood & Affect wnl, Vitals stable   Here today alone   /75  Pulse 68  LMP 06/02/1981  SpO2 92%  Breastfeeding? No   General appearance normal   Vitals stable   Alert, oriented and in no acute distress     L medial canthus 4mm pink pearly papule    R nostril 4m pink pearly papule       The remainder of expanded problem focused exam was unremarkable; the following areas were examined:  scalp/hair, conjunctiva/lids, face, neck, lips      Eyes: Conjunctivae/lids:Normal     ENT: Lips, buccal mucosa, tongue: normal    MSK:Normal    Cardiovascular: peripheral edema none    Pulm: Breathing Normal    Lymph Nodes: No Head and Neck Lymphadenopathy     Neuro/Psych: Orientation:Normal; Mood/Affect:Normal      MICRO:   R nostril:Orthokeratosis of epidermis with a proliferation of nests of basaloid cells, with peripheral palisading and a  haphazard arrangement in the center extending into the dermis, forming nodules.  The tumor cells have hyperchromatic nuclei. Poor cytoplasm and intercellular bridging.    A/P:  1. R nostril r/o basal cell carcinoma   TANGENTIAL BIOPSY IN HOUSE:  After consent, anesthesia with LEC and prep, tangential excision performed and dx above confirmed with frozen section histology.  No complications and routine wound care.  Patient told result basal cell carcinoma   2. L medial canhtus rec basal cell carcinoma   .      BENIGN LESIONS DISCUSSED WITH PATIENT:  I discussed the specifics of tumor, prognosis, and genetics of benign lesions.  I explained that treatment of these lesions would be purely cosmetic and not medically neccessary.  I discussed with patient different removal options including excision, cautery and /or laser.      Nature and genetics of benign skin lesions dicussed with patient.  Signs and Symptoms of skin cancer discussed with patient.  Patient to follow up with Primary Care provider regarding elevated blood pressure.  Patient encouraged to perform monthly skin exams.  UV precautions reviewed with patient.  Patient to follow up with Primary Care provider regarding elevated blood pressure.  Skin care regimen reviewed with patient: Eliminate harsh soaps, i.e. Dial, zest, irsih spring; Mild soaps such as Cetaphil or Dove sensitive skin, avoid hot or cold showers, aggressive use of emollients including vanicream, cetaphil or cerave discussed with patient.    Risks of non-melanoma skin cancer discussed with patient   Return to clinic 6 months    PROCEDURE NOTE  R nostril basal cell carcinoma   MOHS:   Location    After PGACAC discussed with patient, decision for Mohs surgery was made. Indication for Mohs was Location. Patient confirmed the site with Dr. Richard.  After anesthesia with LEC, the tumor was excised using standard Mohs technique in 1 stages(s).  CLEAR MARGINS OBTAINED and Final defect size was 0.9  cm.       REPAIR SECOND INTENT: We discussed the options for wound management in full with the patient including risks/benefits/possible outcomes. Decision made to allow the wound to heal by second intention. EBL minimal; complications none; wound care routine.  The patient was discharged in good condition and will return in one month or prn for wound evaluation.      L medial canthus basal cell carcinoma   MOHS:   Location, recurrent    After PGACAC discussed with patient, decision for Mohs surgery was made. Indication for Mohs was Recurrent. Patient confirmed the site with Dr. Richard.  After anesthesia with LEC, the tumor was excised using standard Mohs technique in 1 stages(s).  CLEAR MARGINS OBTAINED and Final defect size was 1 cm.       REPAIR COMPLEX: Because of the tightness of the surrounding skin and Because of the size and full thickness nature of the defect, a complex closure was planned. After LEC anesthesia and prep, Burow's triangles were excised in the relaxed skin tension lines. The wound edges were widely undermined by dissection in the subcutaneous plane until adequate tissue mobility was obtained. Hemostasis was obtained. The wound edges were closed in a layered fashion using Vicryl and Fast Absorbing Plain Gut sutures. Postoperative length was 3 cm.       This left a defects on medial canthus lid margin.    REPAIR FTSG FROM OTHER: Because of the full-thickness nature of the defect and to avoid distortion, a full-thickness skin graft was planned. After LEC anesthesia and prep, a template was made of the defect and the graft was harvested from the buttows triangle.  . The graft was defatted and trimmed to fit the defect. It was sutured into place with Fast Absorbing Plain Gut suture and a taped Bolster dressing was applied.    EBL minimal; complications none; wound care routine.  The patient was discharged in good condition and will return on one week for wound evaluation.        Again, thank you  for allowing me to participate in the care of your patient.        Sincerely,        Ronnie Richard MD

## 2018-04-19 NOTE — NURSING NOTE
"Chief Complaint   Patient presents with     Derm Problem     mohs       Initial /75  Pulse 68  LMP 06/02/1981  SpO2 92%  Breastfeeding? No Estimated body mass index is 32.84 kg/(m^2) as calculated from the following:    Height as of 11/27/17: 1.594 m (5' 2.75\").    Weight as of 11/27/17: 83.4 kg (183 lb 14.4 oz).  Medication Reconciliation: complete    "

## 2018-04-19 NOTE — PATIENT INSTRUCTIONS
Skin Graft Wound Care     [facility] [phone]     ? No strenuous activity for 48 hours. Resume moderate activity in 48 hours.  No heavy exercising until you are seen for follow up in one week.    ? Take Tylenol as needed for discomfort.                       ? No alcoholic beverages for 48 hours.    ? Leave the bandage in place until you come in for follow up in one week.  If the bandage becomes blood tinged or loose, reinforce it with gauze and tape.     ? Keep the bandage dry. Wash around it carefully.    ? If the tape becomes soiled or starts to come off, reinforce it with additional paper tape.    ? Do not smoke for 3 weeks; smoking is detrimental to wound healing and may cause the graft to die.    ? Avoid prolonged exposure to extremely cold temperatures for 3 weeks.    ? It is normal to have swelling and bruising around the surgical site. The bruising will fade in approximately 10-14 days. Elevate the area to reduce swelling.    ? Numbness, itchiness and sensitivity to temperature changes can occur after surgery and may take up to 18 months to normalize.    POSSIBLE COMPLICATIONS    BLEEDIN. Leave the bandage in place.  2. Use tightly rolled up gauze or a cloth to apply direct pressure over the bandage for 20 minutes.  3. Reapply pressure for an additional 20 minutes if necessary  4. Call the office or go to the nearest emergency room if pressure fails to stop the bleeding.  5. Use additional gauze and tape to maintain pressure once the bleeding has stopped.    PAIN:    1. Post operative pain should slowly get better, beginning the evening after surgery.  2. A sudden or severe increase in pain may indicate a problem. Call the office if this occurs.    In case of emergency phone: Dermatology Clinic: [phone]   or Dr Richard [phone]

## 2018-04-19 NOTE — MR AVS SNAPSHOT
After Visit Summary   2018    Tanya Graham    MRN: [id number]           Patient Information     Date Of Birth          1931        Visit Information        Provider Department      2018 7:45 AM Ronnie Richard MD [facility] Instructions    Skin Graft Wound Care     Dermatology Clinic [phone]     ? No strenuous activity for 48 hours. Resume moderate activity in 48 hours.  No heavy exercising until you are seen for follow up in one week.    ? Take Tylenol as needed for discomfort.                       ? No alcoholic beverages for 48 hours.    ? Leave the bandage in place until you come in for follow up in one week.  If the bandage becomes blood tinged or loose, reinforce it with gauze and tape.     ? Keep the bandage dry. Wash around it carefully.    ? If the tape becomes soiled or starts to come off, reinforce it with additional paper tape.    ? Do not smoke for 3 weeks; smoking is detrimental to wound healing and may cause the graft to die.    ? Avoid prolonged exposure to extremely cold temperatures for 3 weeks.    ? It is normal to have swelling and bruising around the surgical site. The bruising will fade in approximately 10-14 days. Elevate the area to reduce swelling.    ? Numbness, itchiness and sensitivity to temperature changes can occur after surgery and may take up to 18 months to normalize.    POSSIBLE COMPLICATIONS    BLEEDIN. Leave the bandage in place.  2. Use tightly rolled up gauze or a cloth to apply direct pressure over the bandage for 20 minutes.  3. Reapply pressure for an additional 20 minutes if necessary  4. Call the office or go to the nearest emergency room if pressure fails to stop the bleeding.  5. Use additional gauze and tape to maintain pressure once the bleeding has stopped.    PAIN:    1. Post operative pain should slowly get better, beginning the evening after surgery.  2. A sudden or severe  "increase in pain may indicate a problem. Call the office if this occurs.    In case of emergency phone: Dermatology Clinic: [phone]   or Dr Richard [phone]              Follow-ups after your visit        Who to contact     If you have questions or need follow up information about today's clinic visit or your schedule please contact [facility] directly at [phone].  Normal or non-critical lab and imaging results will be communicated to you by [employer], letter or phone within 4 business days after the clinic has received the results. If you do not hear from us within 7 days, please contact the clinic through [employer] or phone. If you have a critical or abnormal lab result, we will notify you by phone as soon as possible.  Submit refill requests through [employer] or call your pharmacy and they will forward the refill request to us. Please allow 3 business days for your refill to be completed.          Additional Information About Your Visit        [employer] Information     [employer] lets you send messages to your doctor, view your test results, renew your prescriptions, schedule appointments and more. To sign up, go to [email or web address] . Click on \"Log in\" on the left side of the screen, which will take you to the Welcome page. Then click on \"Sign up Now\" on the right side of the page.     You will be asked to enter the access code listed below, as well as some personal information. Please follow the directions to create your username and password.     Your access code is: [email or web address]  Expires: 2018  2:14 PM     Your access code will  in 90 days. If you need help or a new code, please call your [facility] or [phone].        Care EveryWhere ID     This is your Care EveryWhere ID. This could be used by other organizations to access your [facility] medical records  [id number]        Your Vitals Were     Pulse Last Period Pulse Oximetry Breastfeeding?          68 " 06/02/1981 92% No         Blood Pressure from Last 3 Encounters:   04/19/18 169/75   02/28/18 158/76   11/27/17 136/70    Weight from Last 3 Encounters:   11/27/17 83.4 kg (183 lb 14.4 oz)   08/29/17 84.3 kg (185 lb 14.4 oz)   10/20/16 85.7 kg (189 lb)              Today, you had the following     No orders found for display       Primary Care Provider Office Phone # Fax #    Miriam Shante Bird [phone]       [address]  [address]   [facility] [address]        Equal Access to Services     [facility] : Hadii daniel reddy hadasho Soomaali, waaxda luqadaha, qaybta kaalmada adeegyada, boris robison . So [facility] [phone].    ATENCIÓN: Si habla español, tiene a sierra disposición servicios gratuitos de asistencia lingüística. Llame al [phone].    We comply with applicable federal civil rights laws and [place] laws. We do not discriminate on the basis of race, color, national origin, age, disability, sex, sexual orientation, or gender identity.            Thank you!     Thank you for choosing [facility]  for your care. Our goal is always to provide you with excellent care. Hearing back from our patients is one way we can continue to improve our services. Please take a few minutes to complete the written survey that you may receive in the mail after your visit with us. Thank you!             Your Updated Medication List - Protect others around you: Learn how to safely use, store and throw away your medicines at www.disposemymeds.org.          This list is accurate as of 4/19/18 11:11 AM.  Always use your most recent med list.                   Brand Name Dispense Instructions for use Diagnosis    ASPIR-81 PO      None Entered        atenolol 50 MG tablet    TENORMIN    90 tablet    TAKE 1 TABLET (50 MG) BY MOUTH DAILY    Essential hypertension, benign       cholecalciferol 1000 units capsule    vitamin  -D     Take 1 capsule by mouth daily. Patient  takes (2) capsules daily.        clotrimazole 1 % cream    LOTRIMIN    60 g    Apply topically 2 times daily    Yeast infection of the skin       fish oil-omega-3 fatty acids 1000 MG capsule      Take 2 g by mouth daily. Patient takes 2-capsules daily.        furosemide 20 MG tablet    LASIX    90 tablet    Take 1 tablet (20 mg) by mouth daily    Essential hypertension, benign       ibuprofen 400 MG tablet    ADVIL/MOTRIN    30 tablet    Take 1 tablet (400 mg) by mouth every 6 hours as needed for moderate pain        lisinopril 10 MG tablet    PRINIVIL/ZESTRIL    90 tablet    TAKE 1 TABLET (10 MG) BY MOUTH DAILY    Essential hypertension, benign       mupirocin 2 % ointment    BACTROBAN    22 g    Use 2 times a day to nose    Dermatitis       nystatin ointment    MYCOSTATIN    30 g    Twice daily to groin rash until clear, then as needed.    Intertrigo       OCUVITE-LUTEIN PO      Take  by mouth. Patient takes 2 tabs daily.        simvastatin 10 MG tablet    ZOCOR    90 tablet    Take 1 tablet (10 mg) by mouth At Bedtime    Mixed hyperlipidemia       triamcinolone 0.1 % ointment    KENALOG    80 g    To finger rash twice daily    Dermatitis

## 2018-04-20 NOTE — PROGRESS NOTES
"  SUBJECTIVE:   Tanya Graham is a 87 year old female who presents to clinic today for the following health issues:  {Provider please address medication reconciliation discrepancies--rooming staff please delete if no med/rec issues}    HPI  {additional problems for roomer to add, delete if none:772147}  Problem list and histories reviewed & adjusted, as indicated.  Additional history: {NONE - AS DOCUMENTED:281595::\"as documented\"}    {ACUTE Problem SUPERLIST - brief histories:548948}    {HIST REVIEW/ LINKS 2:940200}    {PROVIDER CHARTING PREFERENCE:669004}  "

## 2018-04-23 ENCOUNTER — OFFICE VISIT (OUTPATIENT)
Dept: FAMILY MEDICINE | Facility: CLINIC | Age: 83
End: 2018-04-23
Payer: MEDICARE

## 2018-04-23 VITALS
TEMPERATURE: 98.2 F | BODY MASS INDEX: 32.69 KG/M2 | SYSTOLIC BLOOD PRESSURE: 158 MMHG | HEIGHT: 63 IN | DIASTOLIC BLOOD PRESSURE: 70 MMHG | OXYGEN SATURATION: 96 % | WEIGHT: 184.5 LBS | RESPIRATION RATE: 20 BRPM | HEART RATE: 64 BPM

## 2018-04-23 DIAGNOSIS — I10 ESSENTIAL HYPERTENSION, BENIGN: Primary | ICD-10-CM

## 2018-04-23 DIAGNOSIS — M19.041 PRIMARY OSTEOARTHRITIS OF RIGHT HAND: ICD-10-CM

## 2018-04-23 PROCEDURE — 99213 OFFICE O/P EST LOW 20 MIN: CPT | Performed by: FAMILY MEDICINE

## 2018-04-23 RX ORDER — LISINOPRIL/HYDROCHLOROTHIAZIDE 10-12.5 MG
1 TABLET ORAL DAILY
Qty: 90 TABLET | Refills: 1 | Status: SHIPPED | OUTPATIENT
Start: 2018-04-23 | End: 2018-10-19 | Stop reason: ALTCHOICE

## 2018-04-23 ASSESSMENT — PAIN SCALES - GENERAL: PAINLEVEL: NO PAIN (0)

## 2018-04-23 NOTE — PROGRESS NOTES
"  SUBJECTIVE:   Tanya Graham is a 87 year old female who presents to clinic today for the following health issues:    Hypertension   - Pt has noticed her BP is typically elevated in the clinic, measuring 158/70 today in clinic, but does not find these high measurements when she checks at home. She is taking her daily medications [Atenolol, Lisinopril, Lasix] without complications or side effects.       Skin lesions -- Pt recently saw a dermatology specialist to have 2 pre-cancerous/cancerous lesions removed on her face.    Wheezing -- Patient's children have noticed Elma has intermittent episodes of \"loud breathing\", she has noticed an intermittent \"whistling\" while lying in bed that lasts no longer than 5 minutes. She denies any SOB with exertion.  No cough, pt feels well, this is not new    Joint Pain -- Pt reports intermittent, moderate to severe pain in the PIP joint of her right 5 digit.       Problem list and histories reviewed & adjusted, as indicated.  Additional history: as documented      BP Readings from Last 3 Encounters:   04/23/18 158/70   04/19/18 169/75   02/28/18 158/76    Wt Readings from Last 3 Encounters:   04/23/18 83.7 kg (184 lb 8 oz)   11/27/17 83.4 kg (183 lb 14.4 oz)   08/29/17 84.3 kg (185 lb 14.4 oz)               Labs reviewed in EPIC    ROS:  Constitutional, HEENT, cardiovascular, pulmonary, gi and gu systems are negative, except as otherwise noted.    This document serves as a record of the services and decisions personally performed and made by Miriam Bird MD. It was created on his behalf by Whitney Tavarez, a trained medical scribe. The creation of this document is based the provider's statements to the medical scribe.  Scribe Whitney Tavarez 9:22 AM, April 23, 2018    OBJECTIVE:     /70 (BP Location: Right arm, Patient Position: Chair, Cuff Size: Adult Regular)  Pulse 64  Temp 98.2  F (36.8  C) (Oral)  Resp 20  Ht 1.594 m (5' 2.75\")  Wt 83.7 kg (184 lb 8 oz)  LMP 06/02/1981 " " SpO2 96%  BMI 32.94 kg/m2  Body mass index is 32.94 kg/(m^2).  GENERAL: healthy, alert and no distress  RESP: lungs clear to auscultation - no rales, rhonchi or wheezes  CV: regular rate and rhythm, normal S1 S2, no S3 or S4, no murmur, click or rub, no peripheral edema(trace on the left ankle only)  MS: no gross musculoskeletal defects noted, PIP with enlarged joint much like other joints on both hands, nodules noted  SKIN: no suspicious lesions or rashes  NEURO: Normal strength and tone, mentation intact and speech normal  PSYCH: mentation appears normal, affect normal/bright    Diagnostic Test Results:  None  ASSESSMENT/PLAN:     (I10) Essential hypertension, benign  (primary encounter diagnosis)  Comment: Pt will begin new medication(adding hctz 12.5 to her lisinopril) & monitor BP at home; Return to clinic in 2 weeks for BP recheck , cont all other meds  Plan: lisinopril-hydrochlorothiazide         (PRINZIDE/ZESTORETIC) 10-12.5 MG per tablet    Joint Pain - Right, 5th digit in PIP joint  Comment: Advised to use OTC Aspercreme to alleviate pain, also use heat, Follow up if not improving or getting worse          If wheezing recurs, Follow up, normal exam today, no hx of asthma    BMI:   Estimated body mass index is 32.94 kg/(m^2) as calculated from the following:    Height as of this encounter: 1.594 m (5' 2.75\").    Weight as of this encounter: 83.7 kg (184 lb 8 oz).   Weight management plan: Discussed healthy diet and exercise guidelines and patient will follow up in 12 months in clinic to re-evaluate.        Follow-up:  Return to clinic in 2 weeks for BP recheck, 6 months for annual physical  See Patient Instructions      The information in this document, created by the medical scribe for me, accurately reflects the services I personally performed and the decisions made by me. I have reviewed and approved this document for accuracy prior to leaving the patient care area.  9:23 AM, 04/23/18    Miriam " Shante Bird MD  [facility]

## 2018-04-23 NOTE — NURSING NOTE
"Chief Complaint   Patient presents with     Hypertension     Initial /70 (BP Location: Right arm, Patient Position: Chair, Cuff Size: Adult Regular)  Pulse 64  Temp 98.2  F (36.8  C) (Oral)  Resp 20  Ht 5' 2.75\" (1.594 m)  Wt 184 lb 8 oz (83.7 kg)  LMP 06/02/1981  SpO2 96%  BMI 32.94 kg/m2 Estimated body mass index is 32.94 kg/(m^2) as calculated from the following:    Height as of this encounter: 5' 2.75\" (1.594 m).    Weight as of this encounter: 184 lb 8 oz (83.7 kg).  BP completed using cuff size regular right arm    Lisa Magill, CMA    "

## 2018-04-23 NOTE — PROGRESS NOTES
HPI   SUBJECTIVE:   Tanya Graham is a 87 year old female who presents to clinic today for the following health issues:      Hypertension Follow-up      Outpatient blood pressures are being checked at home.  Results are 120-169/58-75.    Low Salt Diet: no added salt      Amount of exercise or physical activity: None    Problems taking medications regularly: Yes,  Swallowing     Medication side effects: none    Diet: regular (no restrictions)                ROS      Physical Exam

## 2018-04-23 NOTE — MR AVS SNAPSHOT
After Visit Summary   4/23/2018    Tanya Graham    MRN: [id number]           Patient Information     Date Of Birth          1/12/1931        Visit Information        Provider Department      4/23/2018 9:00 AM Miriam Bird MD [facility]        Today's Diagnoses     Essential hypertension, benign    -  1       Follow-ups after your visit        Follow-up notes from your care team     Return in about 2 weeks (around 5/7/2018) for blood pressure check and 6 months for wellbness visit.      Your next 10 appointments already scheduled     Apr 25, 2018 11:00 AM CDT   Nurse Only with [facility]X DERM NURSE   [facility]    [address] [address] [phone]            May 07, 2018 10:00 AM CDT   Nurse Only with FM MA/LPN   [facility]    [address] [place] [address], [address] [address] [phone]            Oct 23, 2018 10:00 AM CDT   PHYSICAL with Miriam Bird MD   [facility] ([facility]    [id number] [place] [address], [address] [place] [address] [phone]              Who to contact     If you have questions or need follow up information about today's clinic visit or your schedule please contact [facility] directly at [phone].  Normal or non-critical lab and imaging results will be communicated to you by MyChart, letter or phone within 4 business days after the clinic has received the results. If you do not hear from us within 7 days, please contact the clinic through MyChart or phone. If you have a critical or abnormal lab result, we will notify you by phone as soon as possible.  Submit refill requests through [employer] or call your pharmacy and they will forward the refill request to us. Please allow 3 business days for your refill to be completed.          Additional  "Information About Your Visit        MyChart Information     [employer] lets you send messages to your doctor, view your test results, renew your prescriptions, schedule appointments and more. To sign up, go to [email or web address] . Click on \"Log in\" on the left side of the screen, which will take you to the Welcome page. Then click on \"Sign up Now\" on the right side of the page.     You will be asked to enter the access code listed below, as well as some personal information. Please follow the directions to create your username and password.     Your access code is: [email or web address]  Expires: 2018  2:14 PM     Your access code will  in 90 days. If you need help or a new code, please call your [place] clinic or [phone].        Care EveryWhere ID     This is your Care EveryWhere ID. This could be used by other organizations to access your [place] medical records  [id number]        Your Vitals Were     Pulse Temperature Respirations Height Last Period Pulse Oximetry    64 98.2  F (36.8  C) (Oral) 20 5' 2.75\" (1.594 m) 1981 96%    BMI (Body Mass Index)                   32.94 kg/m2            Blood Pressure from Last 3 Encounters:   18 158/70   18 169/75   18 158/76    Weight from Last 3 Encounters:   18 184 lb 8 oz (83.7 kg)   17 183 lb 14.4 oz (83.4 kg)   17 185 lb 14.4 oz (84.3 kg)              Today, you had the following     No orders found for display         Today's Medication Changes          These changes are accurate as of 18  9:54 AM.  If you have any questions, ask your nurse or doctor.               Start taking these medicines.        Dose/Directions    lisinopril-hydrochlorothiazide 10-12.5 MG per tablet   Commonly known as:  PRINZIDE/ZESTORETIC   Used for:  Essential hypertension, benign   Started by:  iMriam Bird MD        Dose:  1 tablet   Take 1 tablet by mouth daily   Quantity:  90 tablet   Refills:  1            Where to get " your medicines      These medications were sent to [id number] - [place], [place] - [address]  [address], [facility] [address]     Phone:  [phone]     lisinopril-hydrochlorothiazide 10-12.5 MG per tablet                Primary Care Provider Office Phone # Fax #    Miriam Bird [phone]       [address]   [facility] [address]        Equal Access to Services     [facility] : Hadii aad ku hadasho Soomaali, waaxda luqadaha, qaybta kaalmada adeegyada, waxay idiin hayaan adeeg kharash la'keya . So [facility] [phone].    ATENCIÓN: Si habla español, tiene a sierra disposición servicios gratuitos de asistencia lingüística. LlKettering Health [phone].    We comply with applicable federal civil rights laws and [place] laws. We do not discriminate on the basis of race, color, national origin, age, disability, sex, sexual orientation, or gender identity.            Thank you!     Thank you for choosing [facility]  for your care. Our goal is always to provide you with excellent care. Hearing back from our patients is one way we can continue to improve our services. Please take a few minutes to complete the written survey that you may receive in the mail after your visit with us. Thank you!             Your Updated Medication List - Protect others around you: Learn how to safely use, store and throw away your medicines at www.disposemymeds.org.          This list is accurate as of 4/23/18  9:54 AM.  Always use your most recent med list.                   Brand Name Dispense Instructions for use Diagnosis    ASPIR-81 PO      None Entered        atenolol 50 MG tablet    TENORMIN    90 tablet    TAKE 1 TABLET (50 MG) BY MOUTH DAILY    Essential hypertension, benign       cholecalciferol 1000 units capsule    vitamin  -D     Take 1 capsule by mouth daily. Patient takes (2) capsules daily.        clotrimazole 1 % cream    LOTRIMIN    60 g    Apply  topically 2 times daily    Yeast infection of the skin       fish oil-omega-3 fatty acids 1000 MG capsule      Take 2 g by mouth daily. Patient takes 2-capsules daily.        furosemide 20 MG tablet    LASIX    90 tablet    Take 1 tablet (20 mg) by mouth daily    Essential hypertension, benign       ibuprofen 400 MG tablet    ADVIL/MOTRIN    30 tablet    Take 1 tablet (400 mg) by mouth every 6 hours as needed for moderate pain        lisinopril 10 MG tablet    PRINIVIL/ZESTRIL    90 tablet    TAKE 1 TABLET (10 MG) BY MOUTH DAILY    Essential hypertension, benign       lisinopril-hydrochlorothiazide 10-12.5 MG per tablet    PRINZIDE/ZESTORETIC    90 tablet    Take 1 tablet by mouth daily    Essential hypertension, benign       mupirocin 2 % ointment    BACTROBAN    22 g    Use 2 times a day to nose    Dermatitis       nystatin ointment    MYCOSTATIN    30 g    Twice daily to groin rash until clear, then as needed.    Intertrigo       OCUVITE-LUTEIN PO      Take  by mouth. Patient takes 2 tabs daily.        simvastatin 10 MG tablet    ZOCOR    90 tablet    Take 1 tablet (10 mg) by mouth At Bedtime    Mixed hyperlipidemia       triamcinolone 0.1 % ointment    KENALOG    80 g    To finger rash twice daily    Dermatitis

## 2018-04-25 ENCOUNTER — ALLIED HEALTH/NURSE VISIT (OUTPATIENT)
Dept: DERMATOLOGY | Facility: CLINIC | Age: 83
End: 2018-04-25
Payer: MEDICARE

## 2018-04-25 DIAGNOSIS — Z48.01 ENCOUNTER FOR CHANGE OR REMOVAL OF SURGICAL WOUND DRESSING: Primary | ICD-10-CM

## 2018-04-25 PROCEDURE — 99207 ZZC NO CHARGE NURSE ONLY: CPT

## 2018-04-25 NOTE — MR AVS SNAPSHOT
After Visit Summary   4/25/2018    Tanya Graham    MRN: [id number]           Patient Information     Date Of Birth          1/12/1931        Visit Information        Provider Department      4/25/2018 11:00 AM [facility] NURSE [facility]        Care Instructions    ONE WEEK DRESSING CHANGE  for  SKIN GRAFTS  The following information has been compiled to offer you assistance with the dressing change or wound evaluation. Please feel free to call our office to speak with one of the nurses if you have any questions or concerns about the progress of the wound healing process especially if there are any signs of graft necrosis or infection. We will be happy to answer any questions you might have.                                                               AFTER 24 HOURS YOU SHOULD REMOVE THE BANDAGE AND BEGIN DAILY DRESSING CHANGES AS FOLLOWS:     1) Remove Dressing.     2) Clean and dry the area with tap water using a Q-tip or sterile gauze pad.     3) Apply Vaseline, Polysporin ointment, Aquaphor or Bacitracin ointment over entire wound.  Do NOT use Neosporin ointment.     4) Cover the wound with a band-aid, or a sterile non-stick gauze pad and micropore paper tape      REPEAT THESE INSTRUCTIONS AT LEAST ONCE A DAY UNTIL THE WOUND HAS COMPLETELY HEALED. DO NOT LET THE WOUND SCAB OVER.    It is an old wives tale that a wound heals better when it is exposed to air and allowed to dry out. The wound will heal faster with a better cosmetic result if it is kept moist with ointment and covered with a bandage.       Massaging the wound site hastens the healing process by softening the scar tissue and fading the scar. Begin massaging the area one month after surgery as often as possible. Continue to massage the area for 2-3 months or until you feel the scar tissue has softened. Moisturizers can be used during the massaging but are not necessary. Ultimately it takes six months for the graft  "to heal and blend into the surrounding skin.          Follow-ups after your visit        Your next 10 appointments already scheduled     May 07, 2018 10:00 AM CDT   Nurse Only with SOLOMON GRAVES/LPN   [facility] ([address] [address] [phone]            Oct 23, 2018 10:00 AM CDT   PHYSICAL with Miriam Bird MD   [facility] ([address] [address] [phone]              Who to contact     If you have questions or need follow up information about today's clinic visit or your schedule please contact [facility] directly at [phone].  Normal or non-critical lab and imaging results will be communicated to you by bewarkethart, letter or phone within 4 business days after the clinic has received the results. If you do not hear from us within 7 days, please contact the clinic through bewarkethart or phone. If you have a critical or abnormal lab result, we will notify you by phone as soon as possible.  Submit refill requests through [employer] or call your pharmacy and they will forward the refill request to us. Please allow 3 business days for your refill to be completed.          Additional Information About Your Visit        bewarketharAlgorego Information     [employer] lets you send messages to your doctor, view your test results, renew your prescriptions, schedule appointments and more. To sign up, go to [email or web address] . Click on \"Log in\" on the left side of the screen, which will take you to the Welcome page. Then click on \"Sign up Now\" on the right side of the page.     You will be asked to enter the access code listed below, as well as some personal information. Please follow the directions to create your username and password.     Your access code is: [email or web address]  Expires: 2018  2:14 PM     Your access code will  " in 90 days. If you need help or a new code, please call your [place] clinic or [phone].        Care EveryWhere ID     This is your Care EveryWhere ID. This could be used by other organizations to access your [place] medical records  [id number]        Your Vitals Were     Last Period                   06/02/1981            Blood Pressure from Last 3 Encounters:   04/23/18 158/70   04/19/18 169/75   02/28/18 158/76    Weight from Last 3 Encounters:   04/23/18 83.7 kg (184 lb 8 oz)   11/27/17 83.4 kg (183 lb 14.4 oz)   08/29/17 84.3 kg (185 lb 14.4 oz)              Today, you had the following     No orders found for display       Primary Care Provider Office Phone # Fax #    Miriam Shante Bird [phone]       [id number]  [address]   [facility] [address]        Equal Access to Services     [facility] : Hadii aad ku hadasho Soomaali, waaxda luqadaha, qaybta kaalmada adeegyada, waxay idiin hayaan jeweleg khmarisela robison . So [facility] [phone].    ATENCIÓN: Si habla español, tiene a sierra disposición servicios gratuitos de asistencia lingüística. Llame al [phone].    We comply with applicable federal civil rights laws and [place] laws. We do not discriminate on the basis of race, color, national origin, age, disability, sex, sexual orientation, or gender identity.            Thank you!     Thank you for choosing [facility]  for your care. Our goal is always to provide you with excellent care. Hearing back from our patients is one way we can continue to improve our services. Please take a few minutes to complete the written survey that you may receive in the mail after your visit with us. Thank you!             Your Updated Medication List - Protect others around you: Learn how to safely use, store and throw away your medicines at www.disposemymeds.org.          This list is accurate as of 4/25/18 11:18 AM.  Always use your most recent med list.                    Brand Name Dispense Instructions for use Diagnosis    ASPIR-81 PO      None Entered        atenolol 50 MG tablet    TENORMIN    90 tablet    TAKE 1 TABLET (50 MG) BY MOUTH DAILY    Essential hypertension, benign       cholecalciferol 1000 units capsule    vitamin  -D     Take 1 capsule by mouth daily. Patient takes (2) capsules daily.        clotrimazole 1 % cream    LOTRIMIN    60 g    Apply topically 2 times daily    Yeast infection of the skin       fish oil-omega-3 fatty acids 1000 MG capsule      Take 2 g by mouth daily. Patient takes 2-capsules daily.        furosemide 20 MG tablet    LASIX    90 tablet    Take 1 tablet (20 mg) by mouth daily    Essential hypertension, benign       ibuprofen 400 MG tablet    ADVIL/MOTRIN    30 tablet    Take 1 tablet (400 mg) by mouth every 6 hours as needed for moderate pain        lisinopril 10 MG tablet    PRINIVIL/ZESTRIL    90 tablet    TAKE 1 TABLET (10 MG) BY MOUTH DAILY    Essential hypertension, benign       lisinopril-hydrochlorothiazide 10-12.5 MG per tablet    PRINZIDE/ZESTORETIC    90 tablet    Take 1 tablet by mouth daily    Essential hypertension, benign       mupirocin 2 % ointment    BACTROBAN    22 g    Use 2 times a day to nose    Dermatitis       nystatin ointment    MYCOSTATIN    30 g    Twice daily to groin rash until clear, then as needed.    Intertrigo       OCUVITE-LUTEIN PO      Take  by mouth. Patient takes 2 tabs daily.        simvastatin 10 MG tablet    ZOCOR    90 tablet    Take 1 tablet (10 mg) by mouth At Bedtime    Mixed hyperlipidemia       triamcinolone 0.1 % ointment    KENALOG    80 g    To finger rash twice daily    Dermatitis

## 2018-04-25 NOTE — PATIENT INSTRUCTIONS
ONE WEEK DRESSING CHANGE  for  SKIN GRAFTS  The following information has been compiled to offer you assistance with the dressing change or wound evaluation. Please feel free to call our office to speak with one of the nurses if you have any questions or concerns about the progress of the wound healing process especially if there are any signs of graft necrosis or infection. We will be happy to answer any questions you might have.                                                               AFTER 24 HOURS YOU SHOULD REMOVE THE BANDAGE AND BEGIN DAILY DRESSING CHANGES AS FOLLOWS:     1) Remove Dressing.     2) Clean and dry the area with tap water using a Q-tip or sterile gauze pad.     3) Apply Vaseline, Polysporin ointment, Aquaphor or Bacitracin ointment over entire wound.  Do NOT use Neosporin ointment.     4) Cover the wound with a band-aid, or a sterile non-stick gauze pad and micropore paper tape      REPEAT THESE INSTRUCTIONS AT LEAST ONCE A DAY UNTIL THE WOUND HAS COMPLETELY HEALED. DO NOT LET THE WOUND SCAB OVER.    It is an old wives tale that a wound heals better when it is exposed to air and allowed to dry out. The wound will heal faster with a better cosmetic result if it is kept moist with ointment and covered with a bandage.       Massaging the wound site hastens the healing process by softening the scar tissue and fading the scar. Begin massaging the area one month after surgery as often as possible. Continue to massage the area for 2-3 months or until you feel the scar tissue has softened. Moisturizers can be used during the massaging but are not necessary. Ultimately it takes six months for the graft to heal and blend into the surrounding skin.

## 2018-04-26 NOTE — NURSING NOTE
Pt returned to clinic for post surgery 1 week follow up bandage change. Pt has no complaints, denies pain. Bandage removed 4/25/18, area cleansed with normal saline. Site is healing and wound edges approximating well. Reapplied new steri strips and paper tape. Bolster removed, area clean and dry.    Advised to watch for signs/sx of infection; spreading redness, drainage, odor, fever. Call or report promptly to clinic. Pt given written instructions and informed to rtc as needed. Patient verbalized understanding.     Destin RN-BSN  [facility]  [phone]

## 2018-05-07 ENCOUNTER — ALLIED HEALTH/NURSE VISIT (OUTPATIENT)
Dept: NURSING | Facility: CLINIC | Age: 83
End: 2018-05-07
Payer: MEDICARE

## 2018-05-07 VITALS — SYSTOLIC BLOOD PRESSURE: 122 MMHG | DIASTOLIC BLOOD PRESSURE: 68 MMHG | HEART RATE: 60 BPM

## 2018-05-07 DIAGNOSIS — I10 ESSENTIAL HYPERTENSION, BENIGN: Primary | ICD-10-CM

## 2018-05-07 PROCEDURE — 99207 ZZC NO CHARGE NURSE ONLY: CPT

## 2018-05-07 NOTE — MR AVS SNAPSHOT
"              After Visit Summary   5/7/2018    Tanya Graham    MRN: [id number]           Patient Information     Date Of Birth          1/12/1931        Visit Information        Provider Department      5/7/2018 10:00 AM SOLOMON GRAVES/LPN [facility]        Today's Diagnoses     Essential hypertension, benign    -  1       Follow-ups after your visit        Your next 10 appointments already scheduled     Oct 23, 2018 10:00 AM CDT   PHYSICAL with Miriam Bird MD   [facility]    [address] [place] [address], [address]  [facility] [address] [phone]              Who to contact     If you have questions or need follow up information about today's clinic visit or your schedule please contact [facility] directly at [phone].  Normal or non-critical lab and imaging results will be communicated to you by MyChart, letter or phone within 4 business days after the clinic has received the results. If you do not hear from us within 7 days, please contact the clinic through MyChart or phone. If you have a critical or abnormal lab result, we will notify you by phone as soon as possible.  Submit refill requests through [employer] or call your pharmacy and they will forward the refill request to us. Please allow 3 business days for your refill to be completed.          Additional Information About Your Visit        MyChart Information     [employer] lets you send messages to your doctor, view your test results, renew your prescriptions, schedule appointments and more. To sign up, go to [email or web address] . Click on \"Log in\" on the left side of the screen, which will take you to the Welcome page. Then click on \"Sign up Now\" on the right side of the page.     You will be asked to enter the access code listed below, as well as some personal information. Please follow the directions to create your username and password.     Your access code is: " [email or web address]  Expires: 2018  2:14 PM     Your access code will  in 90 days. If you need help or a new code, please call your [place] clinic or [phone].        Care EveryWhere ID     This is your Care EveryWhere ID. This could be used by other organizations to access your [place] medical records  [id number]        Your Vitals Were     Pulse Last Period                60 1981           Blood Pressure from Last 3 Encounters:   18 122/68   18 158/70   18 169/75    Weight from Last 3 Encounters:   18 184 lb 8 oz (83.7 kg)   17 183 lb 14.4 oz (83.4 kg)   17 185 lb 14.4 oz (84.3 kg)              Today, you had the following     No orders found for display       Primary Care Provider Office Phone # Fax #    Miriam Shante Bird [phone]       [id number]  [address]   [facility] [address]        Equal Access to Services     [facility]: Hadii aad ku hadasho Soomaali, waaxda luqadaha, qaybta kaalmada adeegyada, waxeddy robison . So [facility] [phone].    ATENCIÓN: Si habla español, tiene a sierra disposición servicios gratuitos de asistencia lingüística. Llame al [phone].    We comply with applicable federal civil rights laws and [place] laws. We do not discriminate on the basis of race, color, national origin, age, disability, sex, sexual orientation, or gender identity.            Thank you!     Thank you for choosing [facility]  for your care. Our goal is always to provide you with excellent care. Hearing back from our patients is one way we can continue to improve our services. Please take a few minutes to complete the written survey that you may receive in the mail after your visit with us. Thank you!             Your Updated Medication List - Protect others around you: Learn how to safely use, store and throw away your medicines at www.disposemymeds.org.          This list is accurate as of  5/7/18 10:19 AM.  Always use your most recent med list.                   Brand Name Dispense Instructions for use Diagnosis    ASPIR-81 PO      None Entered        atenolol 50 MG tablet    TENORMIN    90 tablet    TAKE 1 TABLET (50 MG) BY MOUTH DAILY    Essential hypertension, benign       cholecalciferol 1000 units capsule    vitamin  -D     Take 1 capsule by mouth daily. Patient takes (2) capsules daily.        clotrimazole 1 % cream    LOTRIMIN    60 g    Apply topically 2 times daily    Yeast infection of the skin       fish oil-omega-3 fatty acids 1000 MG capsule      Take 2 g by mouth daily. Patient takes 2-capsules daily.        furosemide 20 MG tablet    LASIX    90 tablet    Take 1 tablet (20 mg) by mouth daily    Essential hypertension, benign       ibuprofen 400 MG tablet    ADVIL/MOTRIN    30 tablet    Take 1 tablet (400 mg) by mouth every 6 hours as needed for moderate pain        lisinopril 10 MG tablet    PRINIVIL/ZESTRIL    90 tablet    TAKE 1 TABLET (10 MG) BY MOUTH DAILY    Essential hypertension, benign       lisinopril-hydrochlorothiazide 10-12.5 MG per tablet    PRINZIDE/ZESTORETIC    90 tablet    Take 1 tablet by mouth daily    Essential hypertension, benign       mupirocin 2 % ointment    BACTROBAN    22 g    Use 2 times a day to nose    Dermatitis       nystatin ointment    MYCOSTATIN    30 g    Twice daily to groin rash until clear, then as needed.    Intertrigo       OCUVITE-LUTEIN PO      Take  by mouth. Patient takes 2 tabs daily.        simvastatin 10 MG tablet    ZOCOR    90 tablet    Take 1 tablet (10 mg) by mouth At Bedtime    Mixed hyperlipidemia       triamcinolone 0.1 % ointment    KENALOG    80 g    To finger rash twice daily    Dermatitis

## 2018-05-07 NOTE — PROGRESS NOTES
Tanya Graham is a 87 year old patient who comes in today for a Blood Pressure check.  Initial BP:  /68 (BP Location: Right arm, Cuff Size: Adult Regular)  Pulse 60  LMP 06/02/1981     Data Unavailable  Disposition: follow-up as previously indicated by provider

## 2018-08-08 ENCOUNTER — OFFICE VISIT (OUTPATIENT)
Dept: DERMATOLOGY | Facility: CLINIC | Age: 83
End: 2018-08-08
Payer: MEDICARE

## 2018-08-08 VITALS — OXYGEN SATURATION: 95 % | HEART RATE: 89 BPM | DIASTOLIC BLOOD PRESSURE: 80 MMHG | SYSTOLIC BLOOD PRESSURE: 130 MMHG

## 2018-08-08 DIAGNOSIS — Z85.828 HISTORY OF BASAL CELL CARCINOMA: ICD-10-CM

## 2018-08-08 DIAGNOSIS — L82.0 INFLAMED SEBORRHEIC KERATOSIS: ICD-10-CM

## 2018-08-08 DIAGNOSIS — L81.4 LENTIGINES: ICD-10-CM

## 2018-08-08 DIAGNOSIS — D22.9 MULTIPLE NEVI: ICD-10-CM

## 2018-08-08 DIAGNOSIS — D48.5 NEOPLASM OF UNCERTAIN BEHAVIOR OF SKIN: Primary | ICD-10-CM

## 2018-08-08 DIAGNOSIS — D18.01 CHERRY ANGIOMA: ICD-10-CM

## 2018-08-08 PROCEDURE — 99214 OFFICE O/P EST MOD 30 MIN: CPT | Mod: 25 | Performed by: PHYSICIAN ASSISTANT

## 2018-08-08 PROCEDURE — 11101 HC DESTRUCT BENIGN LESION, UP TO 14: CPT | Performed by: PHYSICIAN ASSISTANT

## 2018-08-08 PROCEDURE — 11100 HC BIOPSY SKIN/SUBQ/MUC MEM, SINGLE LESION: CPT | Mod: 59 | Performed by: PHYSICIAN ASSISTANT

## 2018-08-08 PROCEDURE — 88305 TISSUE EXAM BY PATHOLOGIST: CPT | Mod: TC | Performed by: PHYSICIAN ASSISTANT

## 2018-08-08 PROCEDURE — 17110 DESTRUCTION B9 LES UP TO 14: CPT | Mod: 51 | Performed by: PHYSICIAN ASSISTANT

## 2018-08-08 NOTE — MR AVS SNAPSHOT
After Visit Summary   8/8/2018    Tanya Graham    MRN: [id number]           Patient Information     Date Of Birth          1/12/1931        Visit Information        Provider Department      8/8/2018 11:20 AM Gila Kinsey PA-C [facility]        Today's Diagnoses     Neoplasm of uncertain behavior of skin    -  1      Care Instructions    Nicotinamide 500mg 2/x a day x 1year has shown to reduce the incidence of non-melanoma (squamous and basal cell) skin cancers as well as precancerous lesions (actinic keratoses).       Wear a sunscreen with at least SPF 30 on your face, ears, neck and V of the chest daily. Wear sunscreen on other areas of the body if those areas are exposed to the sun throughout the day. Sunscreens can contain physical and/or chemical blockers. Physical blockers are less likely to clog pores, these include zinc oxide and titanium dioxide. Reapply every two hour and after swimming. Sunscreen examples include Neutrogena, CeraVe, Blue Lizard, Elta MD and many others.    UV radiation  UVA radiation remains constant throughout the day and throughout the year. It is a longer wavelength than UVB and therefore penetrates deeper into the skin leading to immediate and delayed tanning, photoaging, and skin cancer. 70-80% of UVA and UVB radiation occurs between the hours of 10am-2pm.  UVB radiation  UVB radiation causes the most harmful effects and is more significant during the summer months. However, snow and ice can reflect UVB radiation leading to skin damage during the winter months as well. UVB radiation is responsible for tanning, burning, inflammation, delayed erythema (pinkness), pigmentation (brown spots), and skin cancer.   Just because you do not burn or are not developing a tan does not mean that you are not damaging your skin. A 15 minute drive to and from work for 30 years an lead to chronic sun damage of the skin. It is important to wear a broad  spectrum (both UVA and UVB) sunscreen EVERY day with at least 30 SPF. Apply to face, ears, neck and v of the chest as this is where most of our sun exposure is. Reapply sunscreen every two hours if you plan on being outside.   Timo Justice. Clinical Dermatology: A Color Guide to Diagnosis and Therapy. Elsevier, 2016.     Proper skin care from [facility]:    -Eliminate harsh soaps as they strip the natural oils from the skin, often resulting in dry itchy skin ( i.e. Dial, Zest, Zaina Spring)  -Use mild soaps such as Cetaphil or Dove Sensitive Skin in the shower. You do not need to use soap on arms, legs, and trunk every time you shower unless visibly soiled.   -Avoid hot or cold showers.  -After showering, lightly dry off and apply moisturizing within 2-3 minutes. This will help trap moisture in the skin.   -Aggressive use of a moisturizer at least 1-2 times a day to the entire body (including -Vanicream, Cetaphil, Aquaphor or Cerave) and moisturize hands after every washing.  -We recommend using moisturizers that come in a tub that needs to be scooped out, not a pump. This has more of an oil base. It will hold moisture in your skin much better than a water base moisturizer. The above recommended are non-pore clogging.                    Wound Care Instructions     FOR SUPERFICIAL WOUNDS     [facility] [phone]    [facility] [phone]          AFTER 24 HOURS YOU SHOULD REMOVE THE BANDAGE AND BEGIN DAILY DRESSING CHANGES AS FOLLOWS:     1) Remove Dressing.     2) Clean and dry the area with tap water using a Q-tip or sterile gauze pad.     3) Apply Vaseline, Aquaphor, Polysporin ointment or Bacitracin ointment over entire wound.  Do NOT use Neosporin ointment.     4) Cover the wound with a band-aid, or a sterile non-stick gauze pad and micropore paper tape      REPEAT THESE INSTRUCTIONS AT LEAST ONCE A DAY UNTIL THE WOUND HAS COMPLETELY HEALED.    It is an old wives  tale that a wound heals better when it is exposed to air and allowed to dry out. The wound will heal faster with a better cosmetic result if it is kept moist with ointment and covered with a bandage.    **Do not let the wound dry out.**      Supplies Needed:      *Cotton tipped applicators (Q-tips)    *Polysporin Ointment or Bacitracin Ointment (NOT NEOSPORIN)    *Band-aids or non-stick gauze pads and micropore paper tape.      PATIENT INFORMATION:    During the healing process you will notice a number of changes. All wounds develop a small halo of redness surrounding the wound.  This means healing is occurring. Severe itching with extensive redness usually indicates sensitivity to the ointment or bandage tape used to dress the wound.  You should call our office if this develops.      Swelling  and/or discoloration around your surgical site is common, particularly when performed around the eye.    All wounds normally drain.  The larger the wound the more drainage there will be.  After 7-10 days, you will notice the wound beginning to shrink and new skin will begin to grow.  The wound is healed when you can see skin has formed over the entire area.  A healed wound has a healthy, shiny look to the surface and is red to dark pink in color to normalize.  Wounds may take approximately 4-6 weeks to heal.  Larger wounds may take 6-8 weeks.  After the wound is healed you may discontinue dressing changes.    You may experience a sensation of tightness as your wound heals. This is normal and will gradually subside.    Your healed wound may be sensitive to temperature changes. This sensitivity improves with time, but if you re having a lot of discomfort, try to avoid temperature extremes.    Patients frequently experience itching after their wound appears to have healed because of the continue healing under the skin.  Plain Vaseline will help relieve the itching.        POSSIBLE COMPLICATIONS    BLEEDIN. Leave the  bandage in place.  2. Use tightly rolled up gauze or a cloth to apply direct pressure over the bandage for 30  minutes.  3. Reapply pressure for an additional 30 minutes if necessary  4. Use additional gauze and tape to maintain pressure once the bleeding has stopped.    WOUND CARE INSTRUCTIONS  FOR CRYOSURGERY        This area treated with liquid nitrogen will form a blister. You do not need to bandage the area until after the blister forms and breaks (which may be a few days).  When the blister breaks, begin daily dressing changes as follows:    1) Clean and dry the area with tap water using clean Q-tip or sterile gauze pad.    2) Apply Polysporin ointment or Bacitracin ointment over entire wound.  Do NOT use Neosporin ointment.    3) Cover the wound with a band-aid or sterile non-stick gauze pad and micropore paper tape.      REPEAT THESE INSTRUCTIONS AT LEAST ONCE A DAY UNTIL THE WOUND HAS COMPLETELY HEALED.        It is an old wives tale that a wound heals better when it is exposed to air and allowed to dry out. The wound will heal faster with a better cosmetic result if it is kept moist with ointment and covered with a bandage.  Do not let the wound dry out.      IMPORTANT INFORMATION ON REVERSE SIDE    Supplies Needed:     *Cotton tipped applicators (Q-tips)   *Polysporin ointment or Bacitracin ointment (NOT NEOSPORIN)   *Band-aids, or non stick gauze pads and micropore paper tape                PATIENT INFORMATION    During the healing process you will notice a number of changes. All wounds develop a small halo of redness surrounding the wound.  This means healing is occurring. Severe itching with extensive redness usually indicates sensitivity to the ointment or bandage tape used to dress the wound.  You should call our office if this develops.      Swelling and/or discoloration around your surgical site is common, particularly when performed around the eye.    All wounds normally drain.  The larger the  wound the more drainage there will be.  After 7-10 days, you will notice the wound beginning to shrink and new skin will begin to grow.  The wound is healed when you can see skin has formed over the entire area.  A healed wound has a healthy, shiny look to the surface and is red to dark pink in color to normalize.  Wounds may take approximately 4-6 weeks to heal.  Larger wounds may take 6-8 weeks.  After the wound is healed you may discontinue dressing changes.    You may experience a sensation of tightness as your wound heals. This is normal and will gradually subside.    Your healed wound may be sensitive to temperature changes. This sensitivity improves with time, but if you re having a lot of discomfort, try to avoid temperature extremes.    Patients frequently experience itching after their wound appears to have healed because of the continue healing under the skin.  Plain Vaseline will help relieve the itching.                   Follow-ups after your visit        Your next 10 appointments already scheduled     Oct 23, 2018 10:00 AM CDT   PHYSICAL with Miriam Bird MD   [facility] ([facility])    [address] [place] [address], [address]  [facility] [address] [phone]              Who to contact     If you have questions or need follow up information about today's clinic visit or your schedule please contact [facility] directly at [phone].  Normal or non-critical lab and imaging results will be communicated to you by MyChart, letter or phone within 4 business days after the clinic has received the results. If you do not hear from us within 7 days, please contact the clinic through MyChart or phone. If you have a critical or abnormal lab result, we will notify you by phone as soon as possible.  Submit refill requests through [employer] or call your pharmacy and they will forward the refill request to us. Please allow 3 business days for your  refill to be completed.          Additional Information About Your Visit        Care EveryWhere ID     This is your Care EveryWhere ID. This could be used by other organizations to access your [facility] medical records  [id number]        Your Vitals Were     Pulse Last Period Pulse Oximetry             89 06/02/1981 95%          Blood Pressure from Last 3 Encounters:   08/08/18 130/80   05/07/18 122/68   04/23/18 158/70    Weight from Last 3 Encounters:   04/23/18 83.7 kg (184 lb 8 oz)   11/27/17 83.4 kg (183 lb 14.4 oz)   08/29/17 84.3 kg (185 lb 14.4 oz)              We Performed the Following     BIOPSY SKIN/SUBQ/MUC MEM, EACH ADDTL LESION     BIOPSY SKIN/SUBQ/MUC MEM, SINGLE LESION     Dermatological path order and indications        Primary Care Provider Office Phone # Fax #    Miriam Shante Bird [phone]       [id number]  [address]   [facility] [id number]        Equal Access to Services     [facility] LAINE : Hadii aad ku hadasho Soomaali, waaxda luqadaha, qaybta kaalmada adeegyada, waxay idiin hayaan jeweleg madeleinearadavid robison . [facility] [phone].    ATENCIÓN: Si habla español, tiene a sierra disposición servicios gratuitos de asistencia lingüística. [facility] [phone].    We comply with applicable federal civil rights laws and [place] laws. We do not discriminate on the basis of race, color, national origin, age, disability, sex, sexual orientation, or gender identity.            Thank you!     Thank you for choosing [facility]  for your care. Our goal is always to provide you with excellent care. Hearing back from our patients is one way we can continue to improve our services. Please take a few minutes to complete the written survey that you may receive in the mail after your visit with us. Thank you!             Your Updated Medication List - Protect others around you: Learn how to safely use, store and throw away your medicines at www.disposemymeds.org.           This list is accurate as of 8/8/18 11:45 AM.  Always use your most recent med list.                   Brand Name Dispense Instructions for use Diagnosis    ASPIR-81 PO      None Entered        atenolol 50 MG tablet    TENORMIN    90 tablet    TAKE 1 TABLET (50 MG) BY MOUTH DAILY    Essential hypertension, benign       cholecalciferol 1000 units capsule    vitamin  -D     Take 1 capsule by mouth daily. Patient takes (2) capsules daily.        clotrimazole 1 % cream    LOTRIMIN    60 g    Apply topically 2 times daily    Yeast infection of the skin       fish oil-omega-3 fatty acids 1000 MG capsule      Take 2 g by mouth daily. Patient takes 2-capsules daily.        furosemide 20 MG tablet    LASIX    90 tablet    Take 1 tablet (20 mg) by mouth daily    Essential hypertension, benign       ibuprofen 400 MG tablet    ADVIL/MOTRIN    30 tablet    Take 1 tablet (400 mg) by mouth every 6 hours as needed for moderate pain        lisinopril 10 MG tablet    PRINIVIL/ZESTRIL    90 tablet    TAKE 1 TABLET (10 MG) BY MOUTH DAILY    Essential hypertension, benign       lisinopril-hydrochlorothiazide 10-12.5 MG per tablet    PRINZIDE/ZESTORETIC    90 tablet    Take 1 tablet by mouth daily    Essential hypertension, benign       mupirocin 2 % ointment    BACTROBAN    22 g    Use 2 times a day to nose    Dermatitis       nystatin ointment    MYCOSTATIN    30 g    Twice daily to groin rash until clear, then as needed.    Intertrigo       OCUVITE-LUTEIN PO      Take  by mouth. Patient takes 2 tabs daily.        simvastatin 10 MG tablet    ZOCOR    90 tablet    Take 1 tablet (10 mg) by mouth At Bedtime    Mixed hyperlipidemia       triamcinolone 0.1 % ointment    KENALOG    80 g    To finger rash twice daily    Dermatitis

## 2018-08-08 NOTE — PROGRESS NOTES
HPI:  Tanya Graham is a 87 year old year old female patient here today for history of BCC evaluation. SP mohs on right nostril and left medial canthus 4/19/18.  Would like a full body exam today .  Patient has a two spots on her right arm states this has been present for years?.  Patient reports the following symptoms:  itchy .  Patient reports the following previous treatmentsnone.  Patient reports the following modifying factors none.  Associated symptoms: none.  Patient has no other skin complaints today.  Remainder of the HPI, Meds, PMH, Allergies, FH, and SH was reviewed in chart.    Pertinent Hx:   BCC  Past Medical History:   Diagnosis Date     Basal cell carcinoma      Benign neoplasm of colon 4/03, 8/07    polyps, 2 and 3     Diverticulosis of colon (without mention of hemorrhage) 8/07    noted on screening colonoscopy     Essential hypertension, benign     Hypertension, Benign     Malignant neoplasm of breast (female), unspecified site 3/07    infiltrating ductal CA left breast     Mixed hyperlipidemia     Hyperlipidemia     NONSPECIFIC MEDICAL HISTORY 1971    left carotid aneurysm, surgical repair     NONSPECIFIC MEDICAL HISTORY 12/04, 2/07    osteopenia '04, nl DEXA '07     NONSPECIFIC MEDICAL HISTORY 4/06    ventricular ectopy; nl stress echo     Other malignant neoplasm of other specified sites of skin     skin cancer removed     Other ventral hernia without mention of obstruction or gangrene     surgical repair     Postmastectomy lymphedema syndrome 2007     Unspecified intestinal obstruction 7/07    partial small bowel obstruction into ventral hernia       Past Surgical History:   Procedure Laterality Date     BIOPSY OF BREAST, NEEDLE CORE  3/07    left breast, infiltrating ductal CA     BREAST LUMPECTOMY, RT/LT  4/07    left breast with sentienl node bx     HYSTERECTOMY, RICH      RICH/BSO-due to prolapsed bladder?     LAP VENTRAL HERNIA REPAIR  5/07    ventral herniorrhaphy     SURGICAL HISTORY OF -    1971    left carotid aneurysm     SURGICAL HISTORY OF -   2006    bilat cataract     SURGICAL HISTORY OF -   2000    bladder repair     SURGICAL HISTORY OF -   2002    ventral hernia repair        Family History   Problem Relation Age of Onset     C.A.D. Mother      Cancer Father      unknown primary cancer     Cancer Brother      prostate     Diabetes Daughter      Cardiovascular Daughter      MI age 37,hx heart transplant     GASTROINTESTINAL DISEASE Daughter      celiac sprue     Connective Tissue Disorder Daughter      fibromyalgia       Social History     Social History     Marital status:      Spouse name: Hollis     Number of children: 2     Years of education: N/A     Occupational History      Retired     Social History Main Topics     Smoking status: Never Smoker     Smokeless tobacco: Never Used     Alcohol use No     Drug use: No     Sexual activity: Yes     Partners: Male      Comment: postmenopausal     Other Topics Concern     Not on file     Social History Narrative       Outpatient Encounter Prescriptions as of 8/8/2018   Medication Sig Dispense Refill     ASPIR-81 OR None Entered       atenolol (TENORMIN) 50 MG tablet TAKE 1 TABLET (50 MG) BY MOUTH DAILY 90 tablet 1     cholecalciferol (VITAMIN-D) 1000 UNIT capsule Take 1 capsule by mouth daily. Patient takes (2) capsules daily.       clotrimazole (LOTRIMIN) 1 % cream Apply topically 2 times daily 60 g 4     fish oil-omega-3 fatty acids (FISH OIL) 1000 MG capsule Take 2 g by mouth daily. Patient takes 2-capsules daily.       furosemide (LASIX) 20 MG tablet Take 1 tablet (20 mg) by mouth daily 90 tablet 3     ibuprofen (ADVIL,MOTRIN) 400 MG tablet Take 1 tablet (400 mg) by mouth every 6 hours as needed for moderate pain 30 tablet 0     lisinopril (PRINIVIL/ZESTRIL) 10 MG tablet TAKE 1 TABLET (10 MG) BY MOUTH DAILY 90 tablet 1     lisinopril-hydrochlorothiazide (PRINZIDE/ZESTORETIC) 10-12.5 MG per tablet Take 1 tablet by mouth daily 90  tablet 1     Multiple Vitamins-Minerals (OCUVITE-LUTEIN PO) Take  by mouth. Patient takes 2 tabs daily.       mupirocin (BACTROBAN) 2 % ointment Use 2 times a day to nose 22 g 1     nystatin (MYCOSTATIN) ointment Twice daily to groin rash until clear, then as needed. 30 g 1     simvastatin (ZOCOR) 10 MG tablet Take 1 tablet (10 mg) by mouth At Bedtime 90 tablet 3     triamcinolone (KENALOG) 0.1 % ointment To finger rash twice daily 80 g 2     No facility-administered encounter medications on file as of 8/8/2018.        Review Of Systems:  Skin: As above  Eyes: negative  Ears/Nose/Throat: negative  Respiratory: No shortness of breath, dyspnea on exertion, cough, or hemoptysis  Cardiovascular: negative  Gastrointestinal: negative  Genitourinary: negative  Musculoskeletal: negative  Neurologic: negative  Psychiatric: negative  Hematologic/Lymphatic/Immunologic: negative  Endocrine: negative      Objective:     /80  Pulse 89  LMP 06/02/1981  SpO2 95%  Eyes: Conjunctivae/lids: Normal   ENT: Lips:  Normal  MSK: Normal  Cardiovascular: Peripheral edema none  Pulm: Breathing Normal  Neuro/Psych: Orientation: Normal; Mood/Affect: Normal, NAD, WDWN  Pt accompanied by: self  Following areas examined:   Scalp, face, eyelids, lips, neck, chest, abdomen, back, buttocks, and R&L upper and lower extremities.      Cleary skin type:i   Findings:  1)Left Upper medial back 0.7cm  2)Left Upper back 0.6cm  3)Red smooth well-defined macules on trunk and extremities.  4)Chaudhry WD smooth macules on face, neck, trunk, and extremities.  5)Well circumscribed macules with symmetric color distribution on trunk and extremities.  6)Flesh/pink well healing patches on left medial canthus and right nostril   7)Inflamed brown, stuck-on scaly appearing papules on trunk and extremities.    Assessment and Plan:  1)Neoplasm of uncertain behavior on left upper medial back 0.7cm  Rule out superficial bcc  TANGENTIAL BIOPSY:  After consent,  anesthesia with LEC and prep, tangential biopsy performed.  No complications and routine wound care.  May grow back and will get a scar. Based on lesion type may need to completely remove lesion. Patient will be notified in 7-10 days of results. Wound care directions given.    2) Neoplasm of uncertain behavior on left upper back 0.6cm  Rule out superficial bcc  TANGENTIAL BIOPSY:  After consent, anesthesia with LEC and prep, tangential biopsy performed.  No complications and routine wound care.  May grow back and will get a scar. Based on lesion type may need to completely remove lesion. Patient will be notified in 7-10 days of results. Wound care directions given.    3-5) lentigines, benign nevi, cherry angiomas  I discussed the specifics of tumor, prognosis, and genetics of benign lesions.  I explained that treatment of these lesions would be purely cosmetic and not medically neccessary.  I discussed with patient different removal options including excision, cryotherapy, cautery and /or laser.        6) history of BB    Signs and Symptoms of non-melanoma skin cancer and ABCDEs of melanoma reviewed with patient. Patient encouraged to perform monthly self skin exams and educated on how to perform them. UV precautions reviewed with patient. Patient was asked about new or changing moles/lesions on body.     Nicotinamide 500mg 2/x a day x 1year has shown to reduce the incidence of non-melanoma (squamous and basal cell) skin cancers as well as precancerous lesions (actinic keratoses).     7) ISK x 3  LN2: Treated with LN2 for 5s for 1-2 cycles. Warned risks of blistering, pain, pigment change, scarring, and incomplete resolution.  Advised patient to return if lesions do not completely resolve within 2-3 months.  Wound care sheet given.    Follow up in 6 months

## 2018-08-08 NOTE — LETTER
8/8/2018         RE: Tanya Graham  [address]  [facility] [address]        Dear Colleague,    Thank you for referring your patient, Tanya Graham, to the [facility]. Please see a copy of my visit note below.    HPI:  Tanya Graham is a 87 year old year old female patient here today for history of BCC evaluation. SP mohs on right nostril and left medial canthus 4/19/18.  Would like a full body exam today .  Patient has a two spots on her right arm states this has been present for years?.  Patient reports the following symptoms:  itchy .  Patient reports the following previous treatmentsnone.  Patient reports the following modifying factors none.  Associated symptoms: none.  Patient has no other skin complaints today.  Remainder of the HPI, Meds, PMH, Allergies, FH, and SH was reviewed in chart.    Pertinent Hx:   BCC  Past Medical History:   Diagnosis Date     Basal cell carcinoma      Benign neoplasm of colon 4/03, 8/07    polyps, 2 and 3     Diverticulosis of colon (without mention of hemorrhage) 8/07    noted on screening colonoscopy     Essential hypertension, benign     Hypertension, Benign     Malignant neoplasm of breast (female), unspecified site 3/07    infiltrating ductal CA left breast     Mixed hyperlipidemia     Hyperlipidemia     NONSPECIFIC MEDICAL HISTORY 1971    left carotid aneurysm, surgical repair     NONSPECIFIC MEDICAL HISTORY 12/04, 2/07    osteopenia '04, nl DEXA '07     NONSPECIFIC MEDICAL HISTORY 4/06    ventricular ectopy; nl stress echo     Other malignant neoplasm of other specified sites of skin     skin cancer removed     Other ventral hernia without mention of obstruction or gangrene     surgical repair     Postmastectomy lymphedema syndrome 2007     Unspecified intestinal obstruction 7/07    partial small bowel obstruction into ventral hernia       Past Surgical History:   Procedure Laterality Date     BIOPSY OF BREAST, NEEDLE CORE  3/07    left  breast, infiltrating ductal CA     BREAST LUMPECTOMY, RT/LT  4/07    left breast with sentienl node bx     HYSTERECTOMY, RICH      RICH/BSO-due to prolapsed bladder?     LAP VENTRAL HERNIA REPAIR  5/07    ventral herniorrhaphy     SURGICAL HISTORY OF -   1971    left carotid aneurysm     SURGICAL HISTORY OF -   2006    bilat cataract     SURGICAL HISTORY OF -   2000    bladder repair     SURGICAL HISTORY OF -   2002    ventral hernia repair        Family History   Problem Relation Age of Onset     C.A.D. Mother      Cancer Father      unknown primary cancer     Cancer Brother      prostate     Diabetes Daughter      Cardiovascular Daughter      MI age 37,hx heart transplant     GASTROINTESTINAL DISEASE Daughter      celiac sprue     Connective Tissue Disorder Daughter      fibromyalgia       Social History     Social History     Marital status:      Spouse name: Hollis     Number of children: 2     Years of education: N/A     Occupational History      Retired     Social History Main Topics     Smoking status: Never Smoker     Smokeless tobacco: Never Used     Alcohol use No     Drug use: No     Sexual activity: Yes     Partners: Male      Comment: postmenopausal     Other Topics Concern     Not on file     Social History Narrative       Outpatient Encounter Prescriptions as of 8/8/2018   Medication Sig Dispense Refill     ASPIR-81 OR None Entered       atenolol (TENORMIN) 50 MG tablet TAKE 1 TABLET (50 MG) BY MOUTH DAILY 90 tablet 1     cholecalciferol (VITAMIN-D) 1000 UNIT capsule Take 1 capsule by mouth daily. Patient takes (2) capsules daily.       clotrimazole (LOTRIMIN) 1 % cream Apply topically 2 times daily 60 g 4     fish oil-omega-3 fatty acids (FISH OIL) 1000 MG capsule Take 2 g by mouth daily. Patient takes 2-capsules daily.       furosemide (LASIX) 20 MG tablet Take 1 tablet (20 mg) by mouth daily 90 tablet 3     ibuprofen (ADVIL,MOTRIN) 400 MG tablet Take 1 tablet (400 mg) by mouth every 6 hours  as needed for moderate pain 30 tablet 0     lisinopril (PRINIVIL/ZESTRIL) 10 MG tablet TAKE 1 TABLET (10 MG) BY MOUTH DAILY 90 tablet 1     lisinopril-hydrochlorothiazide (PRINZIDE/ZESTORETIC) 10-12.5 MG per tablet Take 1 tablet by mouth daily 90 tablet 1     Multiple Vitamins-Minerals (OCUVITE-LUTEIN PO) Take  by mouth. Patient takes 2 tabs daily.       mupirocin (BACTROBAN) 2 % ointment Use 2 times a day to nose 22 g 1     nystatin (MYCOSTATIN) ointment Twice daily to groin rash until clear, then as needed. 30 g 1     simvastatin (ZOCOR) 10 MG tablet Take 1 tablet (10 mg) by mouth At Bedtime 90 tablet 3     triamcinolone (KENALOG) 0.1 % ointment To finger rash twice daily 80 g 2     No facility-administered encounter medications on file as of 8/8/2018.        Review Of Systems:  Skin: As above  Eyes: negative  Ears/Nose/Throat: negative  Respiratory: No shortness of breath, dyspnea on exertion, cough, or hemoptysis  Cardiovascular: negative  Gastrointestinal: negative  Genitourinary: negative  Musculoskeletal: negative  Neurologic: negative  Psychiatric: negative  Hematologic/Lymphatic/Immunologic: negative  Endocrine: negative      Objective:     /80  Pulse 89  LMP 06/02/1981  SpO2 95%  Eyes: Conjunctivae/lids: Normal   ENT: Lips:  Normal  MSK: Normal  Cardiovascular: Peripheral edema none  Pulm: Breathing Normal  Neuro/Psych: Orientation: Normal; Mood/Affect: Normal, NAD, WDWN  Pt accompanied by: self  Following areas examined:   Scalp, face, eyelids, lips, neck, chest, abdomen, back, buttocks, and R&L upper and lower extremities.      Cleary skin type:i   Findings:  1)Left Upper medial back 0.7cm  2)Left Upper back 0.6cm  3)Red smooth well-defined macules on trunk and extremities.  4)Chaudhry WD smooth macules on face, neck, trunk, and extremities.  5)Well circumscribed macules with symmetric color distribution on trunk and extremities.  6)Flesh/pink well healing patches on left medial canthus and  right nostril   7)Inflamed brown, stuck-on scaly appearing papules on trunk and extremities.    Assessment and Plan:  1)Neoplasm of uncertain behavior on left upper medial back 0.7cm  Rule out superficial bcc  TANGENTIAL BIOPSY:  After consent, anesthesia with LEC and prep, tangential biopsy performed.  No complications and routine wound care.  May grow back and will get a scar. Based on lesion type may need to completely remove lesion. Patient will be notified in 7-10 days of results. Wound care directions given.    2) Neoplasm of uncertain behavior on left upper back 0.6cm  Rule out superficial bcc  TANGENTIAL BIOPSY:  After consent, anesthesia with LEC and prep, tangential biopsy performed.  No complications and routine wound care.  May grow back and will get a scar. Based on lesion type may need to completely remove lesion. Patient will be notified in 7-10 days of results. Wound care directions given.    3-5) lentigines, benign nevi, cherry angiomas  I discussed the specifics of tumor, prognosis, and genetics of benign lesions.  I explained that treatment of these lesions would be purely cosmetic and not medically neccessary.  I discussed with patient different removal options including excision, cryotherapy, cautery and /or laser.        6) history of BB    Signs and Symptoms of non-melanoma skin cancer and ABCDEs of melanoma reviewed with patient. Patient encouraged to perform monthly self skin exams and educated on how to perform them. UV precautions reviewed with patient. Patient was asked about new or changing moles/lesions on body.     Nicotinamide 500mg 2/x a day x 1year has shown to reduce the incidence of non-melanoma (squamous and basal cell) skin cancers as well as precancerous lesions (actinic keratoses).     7) ISK x 3  LN2: Treated with LN2 for 5s for 1-2 cycles. Warned risks of blistering, pain, pigment change, scarring, and incomplete resolution.  Advised patient to return if lesions do not  completely resolve within 2-3 months.  Wound care sheet given.    Follow up in 6 months      Again, thank you for allowing me to participate in the care of your patient.        Sincerely,        Gila Kinsey PA-C

## 2018-08-08 NOTE — PATIENT INSTRUCTIONS
Nicotinamide 500mg 2/x a day x 1year has shown to reduce the incidence of non-melanoma (squamous and basal cell) skin cancers as well as precancerous lesions (actinic keratoses).       Wear a sunscreen with at least SPF 30 on your face, ears, neck and V of the chest daily. Wear sunscreen on other areas of the body if those areas are exposed to the sun throughout the day. Sunscreens can contain physical and/or chemical blockers. Physical blockers are less likely to clog pores, these include zinc oxide and titanium dioxide. Reapply every two hour and after swimming. Sunscreen examples include Neutrogena, CeraVe, Blue Lizard, Elta MD and many others.    UV radiation  UVA radiation remains constant throughout the day and throughout the year. It is a longer wavelength than UVB and therefore penetrates deeper into the skin leading to immediate and delayed tanning, photoaging, and skin cancer. 70-80% of UVA and UVB radiation occurs between the hours of 10am-2pm.  UVB radiation  UVB radiation causes the most harmful effects and is more significant during the summer months. However, snow and ice can reflect UVB radiation leading to skin damage during the winter months as well. UVB radiation is responsible for tanning, burning, inflammation, delayed erythema (pinkness), pigmentation (brown spots), and skin cancer.   Just because you do not burn or are not developing a tan does not mean that you are not damaging your skin. A 15 minute drive to and from work for 30 years an lead to chronic sun damage of the skin. It is important to wear a broad spectrum (both UVA and UVB) sunscreen EVERY day with at least 30 SPF. Apply to face, ears, neck and v of the chest as this is where most of our sun exposure is. Reapply sunscreen every two hours if you plan on being outside.   Timo Justice. Clinical Dermatology: A Color Guide to Diagnosis and Therapy. Elsevier, 2016.     Proper skin care from [facility]:    -Eliminate harsh  soaps as they strip the natural oils from the skin, often resulting in dry itchy skin ( i.e. Dial, Zest, Divehi Spring)  -Use mild soaps such as Cetaphil or Dove Sensitive Skin in the shower. You do not need to use soap on arms, legs, and trunk every time you shower unless visibly soiled.   -Avoid hot or cold showers.  -After showering, lightly dry off and apply moisturizing within 2-3 minutes. This will help trap moisture in the skin.   -Aggressive use of a moisturizer at least 1-2 times a day to the entire body (including -Vanicream, Cetaphil, Aquaphor or Cerave) and moisturize hands after every washing.  -We recommend using moisturizers that come in a tub that needs to be scooped out, not a pump. This has more of an oil base. It will hold moisture in your skin much better than a water base moisturizer. The above recommended are non-pore clogging.                    Wound Care Instructions     FOR SUPERFICIAL WOUNDS     [facility] [phone]    [facility] [phone]          AFTER 24 HOURS YOU SHOULD REMOVE THE BANDAGE AND BEGIN DAILY DRESSING CHANGES AS FOLLOWS:     1) Remove Dressing.     2) Clean and dry the area with tap water using a Q-tip or sterile gauze pad.     3) Apply Vaseline, Aquaphor, Polysporin ointment or Bacitracin ointment over entire wound.  Do NOT use Neosporin ointment.     4) Cover the wound with a band-aid, or a sterile non-stick gauze pad and micropore paper tape      REPEAT THESE INSTRUCTIONS AT LEAST ONCE A DAY UNTIL THE WOUND HAS COMPLETELY HEALED.    It is an old wives tale that a wound heals better when it is exposed to air and allowed to dry out. The wound will heal faster with a better cosmetic result if it is kept moist with ointment and covered with a bandage.    **Do not let the wound dry out.**      Supplies Needed:      *Cotton tipped applicators (Q-tips)    *Polysporin Ointment or Bacitracin Ointment (NOT NEOSPORIN)    *Band-aids or non-stick  gauze pads and micropore paper tape.      PATIENT INFORMATION:    During the healing process you will notice a number of changes. All wounds develop a small halo of redness surrounding the wound.  This means healing is occurring. Severe itching with extensive redness usually indicates sensitivity to the ointment or bandage tape used to dress the wound.  You should call our office if this develops.      Swelling  and/or discoloration around your surgical site is common, particularly when performed around the eye.    All wounds normally drain.  The larger the wound the more drainage there will be.  After 7-10 days, you will notice the wound beginning to shrink and new skin will begin to grow.  The wound is healed when you can see skin has formed over the entire area.  A healed wound has a healthy, shiny look to the surface and is red to dark pink in color to normalize.  Wounds may take approximately 4-6 weeks to heal.  Larger wounds may take 6-8 weeks.  After the wound is healed you may discontinue dressing changes.    You may experience a sensation of tightness as your wound heals. This is normal and will gradually subside.    Your healed wound may be sensitive to temperature changes. This sensitivity improves with time, but if you re having a lot of discomfort, try to avoid temperature extremes.    Patients frequently experience itching after their wound appears to have healed because of the continue healing under the skin.  Plain Vaseline will help relieve the itching.        POSSIBLE COMPLICATIONS    BLEEDIN. Leave the bandage in place.  2. Use tightly rolled up gauze or a cloth to apply direct pressure over the bandage for 30  minutes.  3. Reapply pressure for an additional 30 minutes if necessary  4. Use additional gauze and tape to maintain pressure once the bleeding has stopped.    WOUND CARE INSTRUCTIONS  FOR CRYOSURGERY        This area treated with liquid nitrogen will form a blister. You do not need  to bandage the area until after the blister forms and breaks (which may be a few days).  When the blister breaks, begin daily dressing changes as follows:    1) Clean and dry the area with tap water using clean Q-tip or sterile gauze pad.    2) Apply Polysporin ointment or Bacitracin ointment over entire wound.  Do NOT use Neosporin ointment.    3) Cover the wound with a band-aid or sterile non-stick gauze pad and micropore paper tape.      REPEAT THESE INSTRUCTIONS AT LEAST ONCE A DAY UNTIL THE WOUND HAS COMPLETELY HEALED.        It is an old wives tale that a wound heals better when it is exposed to air and allowed to dry out. The wound will heal faster with a better cosmetic result if it is kept moist with ointment and covered with a bandage.  Do not let the wound dry out.      IMPORTANT INFORMATION ON REVERSE SIDE    Supplies Needed:     *Cotton tipped applicators (Q-tips)   *Polysporin ointment or Bacitracin ointment (NOT NEOSPORIN)   *Band-aids, or non stick gauze pads and micropore paper tape                PATIENT INFORMATION    During the healing process you will notice a number of changes. All wounds develop a small halo of redness surrounding the wound.  This means healing is occurring. Severe itching with extensive redness usually indicates sensitivity to the ointment or bandage tape used to dress the wound.  You should call our office if this develops.      Swelling and/or discoloration around your surgical site is common, particularly when performed around the eye.    All wounds normally drain.  The larger the wound the more drainage there will be.  After 7-10 days, you will notice the wound beginning to shrink and new skin will begin to grow.  The wound is healed when you can see skin has formed over the entire area.  A healed wound has a healthy, shiny look to the surface and is red to dark pink in color to normalize.  Wounds may take approximately 4-6 weeks to heal.  Larger wounds may take 6-8  weeks.  After the wound is healed you may discontinue dressing changes.    You may experience a sensation of tightness as your wound heals. This is normal and will gradually subside.    Your healed wound may be sensitive to temperature changes. This sensitivity improves with time, but if you re having a lot of discomfort, try to avoid temperature extremes.    Patients frequently experience itching after their wound appears to have healed because of the continue healing under the skin.  Plain Vaseline will help relieve the itching.

## 2018-08-10 ENCOUNTER — TELEPHONE (OUTPATIENT)
Dept: FAMILY MEDICINE | Facility: CLINIC | Age: 83
End: 2018-08-10

## 2018-08-10 DIAGNOSIS — I10 ESSENTIAL HYPERTENSION, BENIGN: ICD-10-CM

## 2018-08-10 NOTE — TELEPHONE ENCOUNTER
Patient calling stating she talked with Dr. Bird and was advised that if she did not like the combination Lisinopril-hydrochlorothiazide she could call and get the medications split.  She states that she would rather take them separately.  Please send new rx's to pharmacy.  [phone]  Chyna Coleman RN

## 2018-08-13 LAB — COPATH REPORT: NORMAL

## 2018-08-13 RX ORDER — LISINOPRIL 10 MG/1
TABLET ORAL
Qty: 90 TABLET | Refills: 1 | Status: SHIPPED | OUTPATIENT
Start: 2018-08-13 | End: 2018-11-06

## 2018-08-13 RX ORDER — HYDROCHLOROTHIAZIDE 12.5 MG/1
12.5 TABLET ORAL DAILY
Qty: 90 TABLET | Refills: 1 | Status: SHIPPED | OUTPATIENT
Start: 2018-08-13 | End: 2018-11-06

## 2018-08-14 ENCOUNTER — TELEPHONE (OUTPATIENT)
Dept: DERMATOLOGY | Facility: CLINIC | Age: 83
End: 2018-08-14

## 2018-08-14 NOTE — LETTER
[facility]  [address]  [address]  [phone]    8/14/2018       Tanya Graham  [address] [facility] [address]      Dear Tanya:    You are scheduled for Mohs Surgery on: 10/11/18 @7:00am & 10/18/18 @7:00am .    Please check in at [facility], [address].     You don't need to arrive more than 5-10 minutes prior to your appointment time.     Be sure to eat a good breakfast and bathe and wash your hair prior to surgery.     If you are taking any anti-coagulants that are prescribed by your Doctor (such as Coumadin/Warfarin, Plavix, Aspirin, Ibuprofen), please continue taking them.     However, if you are taking anti-coagulants over the counter without a Doctor's order for a medical condition, please discontinue them 10 days prior to surgery.     Please wear loose comfortable clothing as it could possibly be 4-6 hours until your surgery is completed depending upon how many layers of tissue need to be removed.      Thank you,    OLU Richard MD

## 2018-08-14 NOTE — TELEPHONE ENCOUNTER
Patient called back. Educated patient on biopsy results- x2. Educated patient on BCC, Mohs, scheduled Mohs x2, and letter sent. Patient voiced understanding.    Destin RN-BSN  [facility]  [phone]

## 2018-08-14 NOTE — TELEPHONE ENCOUNTER
Called and LM for patient to call back in regards to biopsy results marlen Weir RN-BSN  [facility]  [phone]

## 2018-08-14 NOTE — TELEPHONE ENCOUNTER
Notes Recorded by Gila Kinsey PA-C on 8/13/2018 at 5:55 PM  A. Skin, left medial upper back:   - Superficial basal cell carcinoma, with stromal extent to the deep   margin, see comment     --mohs  B. Skin, left upper back:   - Superficial and nodular basal cell carcinoma, with stromal extent to the    deep margin,    ---mohs

## 2018-09-25 ENCOUNTER — TELEPHONE (OUTPATIENT)
Dept: DERMATOLOGY | Facility: CLINIC | Age: 83
End: 2018-09-25

## 2018-09-25 NOTE — TELEPHONE ENCOUNTER
Called and spoke to patient. Patient requesting her Dermatopathology be sent to her home address from her office visit on 8/18/18. Verified address, patient voiced understanding.    TSEFANIE Weir-BSN  [facility]  [phone]

## 2018-09-25 NOTE — TELEPHONE ENCOUNTER
Reason for call:  Results   Name of test or procedure: biopsy report of last lesions on back  Date of test or procedure: 08/08/2018  Location of test or procedure: [facility]    Additional comments: Please mail biopsy results from 08/08/18 back lesion to patients home addresses    Phone number to reach patient:  Home number on file [phone] (home)    Best Time:  anytime    Can we leave a detailed message on this number?  YES

## 2018-09-25 NOTE — LETTER
[address] [address]  Phone: [phone]  Fax: [phone]    September 25, 2018      Tanya Graham                                                                                                                      [address] [facility] [address]

## 2018-10-01 ENCOUNTER — HOSPITAL ENCOUNTER (OUTPATIENT)
Dept: MAMMOGRAPHY | Facility: CLINIC | Age: 83
Discharge: HOME | End: 2018-10-01
Attending: FAMILY MEDICINE | Admitting: FAMILY MEDICINE
Payer: MEDICARE

## 2018-10-01 DIAGNOSIS — Z12.31 VISIT FOR SCREENING MAMMOGRAM: ICD-10-CM

## 2018-10-01 PROCEDURE — 77063 BREAST TOMOSYNTHESIS BI: CPT

## 2018-10-19 DIAGNOSIS — I10 ESSENTIAL HYPERTENSION, BENIGN: ICD-10-CM

## 2018-10-19 RX ORDER — LISINOPRIL/HYDROCHLOROTHIAZIDE 10-12.5 MG
TABLET ORAL
Qty: 90 TABLET | Refills: 1 | OUTPATIENT
Start: 2018-10-19

## 2018-10-19 NOTE — TELEPHONE ENCOUNTER
"Please note there is a Lisinopril 10mg and hydrochlorothiazide 12.5 sent on 8/13/18. This request is for the mixed RX.    From last Refill:  Chyna Coleman RN    8/10/18  3:55 PM   Note      Patient calling stating she talked with Dr. Bird and was advised that if she did not like the combination Lisinopril-hydrochlorothiazide she could call and get the medications split.  She states that she would rather take them separately.  Please send new rx's to pharmacy.  [phone]  Chyna Coleman RN        _________________________________________________________________________________________________________________________      Requested Prescriptions   Pending Prescriptions Disp Refills     lisinopril-hydrochlorothiazide (PRINZIDE/ZESTORETIC) 10-12.5 MG per tablet [Pharmacy Med Name: LISINOPRIL-HCTZ 10-12.5 MG TAB] 90 tablet 1    Last Written Prescription Date:  4/23/18  Last Fill Quantity: 90,  # refills: 1   Last Office Visit: 4/23/2018 [place]  Follow-up:  Return to clinic in 2 weeks for BP recheck, 6 months for annual physical    Future Office Visit:    Next 5 appointments (look out 90 days)     Nov 06, 2018  9:20 AM CST   [facility]YS[facility] with [facility]iam Bird MD   [facility]    [address] [place] [address], [address] [place] [address] [phone]                  Sig: TAKE 1 TABLET BY MOUTH DAILY    Diuretics (Including Combos) Protocol Passed    10/19/2018  1:56 AM       Passed - Blood pressure under 140/90 in past 12 months    BP Readings from Last 3 Encounters:   08/08/18 130/80   05/07/18 122/68   04/23/18 158/70                Passed - Recent (12 mo) or future (30 days) visit within the authorizing provider's specialty    Patient had office visit in the last 12 months or has a visit in the next 30 days with authorizing provider or within the authorizing provider's specialty.  See \"Patient Info\" tab in inbasket, or \"Choose Columns\" in Meds & Orders " section of the refill encounter.             Passed - Patient is age 18 or older       Passed - No active pregancy on record       Passed - Normal serum creatinine on file in past 12 months    Recent Labs   Lab Test  11/28/17   0907   CR  0.73             Passed - Normal serum potassium on file in past 12 months    Recent Labs   Lab Test  11/28/17   0907   POTASSIUM  4.9                   Passed - Normal serum sodium on file in past 12 months    Recent Labs   Lab Test  11/28/17   0907   NA  140             Passed - No positive pregnancy test in past 12 months

## 2018-11-06 ENCOUNTER — OFFICE VISIT (OUTPATIENT)
Dept: FAMILY MEDICINE | Facility: CLINIC | Age: 83
End: 2018-11-06
Payer: MEDICARE

## 2018-11-06 VITALS
SYSTOLIC BLOOD PRESSURE: 106 MMHG | RESPIRATION RATE: 20 BRPM | WEIGHT: 181.5 LBS | TEMPERATURE: 98.1 F | DIASTOLIC BLOOD PRESSURE: 60 MMHG | HEART RATE: 60 BPM | BODY MASS INDEX: 32.41 KG/M2

## 2018-11-06 DIAGNOSIS — Z00.00 MEDICARE ANNUAL WELLNESS VISIT, SUBSEQUENT: Primary | ICD-10-CM

## 2018-11-06 DIAGNOSIS — E78.2 MIXED HYPERLIPIDEMIA: ICD-10-CM

## 2018-11-06 DIAGNOSIS — Z23 NEED FOR PROPHYLACTIC VACCINATION AND INOCULATION AGAINST INFLUENZA: ICD-10-CM

## 2018-11-06 DIAGNOSIS — M85.80 OSTEOPENIA, UNSPECIFIED LOCATION: ICD-10-CM

## 2018-11-06 DIAGNOSIS — I10 ESSENTIAL HYPERTENSION, BENIGN: ICD-10-CM

## 2018-11-06 PROCEDURE — 90662 IIV NO PRSV INCREASED AG IM: CPT | Performed by: FAMILY MEDICINE

## 2018-11-06 PROCEDURE — G0439 PPPS, SUBSEQ VISIT: HCPCS | Performed by: FAMILY MEDICINE

## 2018-11-06 PROCEDURE — G0008 ADMIN INFLUENZA VIRUS VAC: HCPCS | Performed by: FAMILY MEDICINE

## 2018-11-06 RX ORDER — FUROSEMIDE 20 MG
20 TABLET ORAL DAILY
Qty: 90 TABLET | Refills: 3 | Status: SHIPPED | OUTPATIENT
Start: 2018-11-06 | End: 2019-10-28

## 2018-11-06 RX ORDER — SIMVASTATIN 10 MG
10 TABLET ORAL AT BEDTIME
Qty: 90 TABLET | Refills: 3 | Status: SHIPPED | OUTPATIENT
Start: 2018-11-06 | End: 2019-11-07

## 2018-11-06 RX ORDER — ATENOLOL 50 MG/1
TABLET ORAL
Qty: 90 TABLET | Refills: 1 | Status: SHIPPED | OUTPATIENT
Start: 2018-11-06 | End: 2019-05-07

## 2018-11-06 RX ORDER — LISINOPRIL 10 MG/1
TABLET ORAL
Qty: 90 TABLET | Refills: 1 | Status: SHIPPED | OUTPATIENT
Start: 2018-11-06 | End: 2019-05-07

## 2018-11-06 RX ORDER — HYDROCHLOROTHIAZIDE 12.5 MG/1
12.5 TABLET ORAL DAILY
Qty: 90 TABLET | Refills: 1 | Status: SHIPPED | OUTPATIENT
Start: 2018-11-06 | End: 2019-05-07

## 2018-11-06 NOTE — NURSING NOTE
"Chief Complaint   Patient presents with     Medicare Visit     Blood Draw     LABS.  Patient is NOT fasting     Flu Shot     Initial /60 (BP Location: Right arm, Patient Position: Sitting, Cuff Size: Adult Regular)  Pulse 60  Temp 98.1  F (36.7  C) (Oral)  Resp 20  Wt 181 lb 8 oz (82.3 kg)  LMP 06/02/1981  BMI 32.41 kg/m2 Estimated body mass index is 32.41 kg/(m^2) as calculated from the following:    Height as of 4/23/18: 5' 2.75\" (1.594 m).    Weight as of this encounter: 181 lb 8 oz (82.3 kg).  BP completed using cuff size regular right arm    Lisa Magill, CMA    "

## 2018-11-06 NOTE — MR AVS SNAPSHOT
After Visit Summary   11/6/2018    Tanya Graham    MRN: [id number]           Patient Information     Date Of Birth          1/12/1931        Visit Information        Provider Department      11/6/2018 9:20 AM Miriam Bird MD [facility]'s Diagnoses     Medicare annual wellness visit, subsequent    -  1    Essential hypertension, benign        Mixed hyperlipidemia        Need for prophylactic vaccination and inoculation against influenza        Osteopenia, unspecified location          Care Instructions          Services Typically covered by Medicare Recommended Completed   Vaccines    Pneumonoccol    Influenza    Hepatitis B (if medium/high risk)     Once for patients after age 65    Yearly  Medium/high risk factors:    End Stage Kidney Disease    Hemophiliacs who received Factor XIII or IX concentrates    Clients of institutions for developmentally disabled    Persons who live in same house as a Hepatitis B carrier    Homosexual men    Illicit injectable drug users    Health care workers     Mammogram Covered: One-time screen between age 35-39, annually for age 40+     Pap and Pelvic Exam Covered: Annually if  high risk,  or childbearing age with abnormal Pap in last 3 years.  Q24 months for all other women     Prostate Cancer Screening    Digital rectal exam    PSA Covered: Annually for all men > age 50     Corolrectal Cancer Screening Screening colonoscopy every 10 years, more often for high risk patients     Diabetes Self-Management Training Requires referral by treating physician for patient with diabetes     Diabetes Screening    Fasting blood sugar or glucose tolerance test   Once yearly, twice yearly if prediabetic     Cardiovascular Screening Blood Tests    Total Cholesterol    HDL    Triglycerides Every 5 years     Medical Nutrition Therapy for Diabetes or Renal Disease Requires referral by treating physician for patient with diabetes or kidney disease      Glaucoma Screening Annually for patients with one of the following risk factors:    Diabetes Mellitus    Family history of Glaucoma    -American age 50 and over    -American age 65 and over     Bone Mass Measurement Every 24 months if one of the following risk factors:    Estrogen deficiency    Vertebral abnormalities on x-ray indicative of Osteoporosis, Osteopenia, or Vertebral fracture    Receiving/expected to receive the equivalent of at least 5 mg of Prednisone per day for > 3 months    Hyperparathyroidism    Patient being monitored for response to Osteoporosis Therapy     One-time AAA screen  Must be ordered as part of Medicare IPPE   Any patient with a family history of AAA    Males Age 65-75, with history of smoking at least 100 cigarettes in lifetime     Smoking Cessation Counseling Beneficiaries who use tobacco are eligible to receive 2 cessation attempts per year; each attempt includes maximum of 4 sessions     HIV Screening Annually for beneficiaries at increased risk:       Increased risk for HIV infection is defined in the  National Coverage Determinations (NCD) Manual,  Publication 100-03 Sections 190.14 (diagnostic) and 210.7 (screening). See http://www.cms.gov/manuals/downloads/xzn613i1_Vjsu9.pdf and http://www.cms.gov/manuals/downloads/azz388f0_Vruu7.pdf on the Internet.  Three times per pregnancy for beneficiaries who are pregnant.     Future Annual Wellness Visit Annually, for all beneficiaries.               Follow-ups after your visit        Follow-up notes from your care team     Return in about 6 months (around 5/6/2019) for blood pressure check.      Future tests that were ordered for you today     Open Future Orders        Priority Expected Expires Ordered    Lipid panel reflex to direct LDL Fasting Routine  11/6/2019 11/6/2018    Comprehensive metabolic panel Routine  11/6/2019 11/6/2018    TSH with free T4 reflex Routine  11/6/2019 11/6/2018    CBC with platelets Routine   11/6/2019 11/6/2018    Vitamin D Deficiency Routine  11/6/2019 11/6/2018            Who to contact     If you have questions or need follow up information about today's clinic visit or your schedule please contact [facility] directly at [phone].  Normal or non-critical lab and imaging results will be communicated to you by MyChart, letter or phone within 4 business days after the clinic has received the results. If you do not hear from us within 7 days, please contact the clinic through MyChart or phone. If you have a critical or abnormal lab result, we will notify you by phone as soon as possible.  Submit refill requests through [employer] or call your pharmacy and they will forward the refill request to us. Please allow 3 business days for your refill to be completed.          Additional Information About Your Visit        Care EveryWhere ID     This is your Care EveryWhere ID. This could be used by other organizations to access your [facility] medical records  [id number]        Your Vitals Were     Pulse Temperature Respirations Last Period BMI (Body Mass Index)       60 98.1  F (36.7  C) (Oral) 20 06/02/1981 32.41 kg/m2        Blood Pressure from Last 3 Encounters:   11/06/18 106/60   08/08/18 130/80   05/07/18 122/68    Weight from Last 3 Encounters:   11/06/18 181 lb 8 oz (82.3 kg)   04/23/18 184 lb 8 oz (83.7 kg)   11/27/17 183 lb 14.4 oz (83.4 kg)              We Performed the Following     FLU VACCINE, INCREASED ANTIGEN, PRESV FREE, AGE 65+ [01601]     Vaccine Administration, Initial [91021]          Where to get your medicines      These medications were sent to [facility]/pharmacy #[id number] - [place] - [address]  [address]  [place] , [facility] [address]     Phone:  [phone]     atenolol 50 MG tablet    furosemide 20 MG tablet    hydrochlorothiazide 12.5 MG Tabs tablet    lisinopril 10 MG tablet    simvastatin 10 MG tablet          Primary Care Provider Office Phone # Fax #     Miriam Bird [phone]       [address]   [facility] [address]        Equal Access to Services     BOB JANG : Hadii aad ku haddaylino Socristyali, wajessicada luqadaha, qaybta kaalmada aston, boris darrylin hayaarachid hollowaylou tran ricki marvin. So [facility] [phone].    ATENCIÓN: Si habla español, tiene a sierra disposición servicios gratuitos de asistencia lingüística. Llame al [phone].    We comply with applicable federal civil rights laws and [place] laws. We do not discriminate on the basis of race, color, national origin, age, disability, sex, sexual orientation, or gender identity.            Thank you!     Thank you for choosing [facility]  for your care. Our goal is always to provide you with excellent care. Hearing back from our patients is one way we can continue to improve our services. Please take a few minutes to complete the written survey that you may receive in the mail after your visit with us. Thank you!             Your Updated Medication List - Protect others around you: Learn how to safely use, store and throw away your medicines at www.disposemymeds.org.          This list is accurate as of 11/6/18 10:12 AM.  Always use your most recent med list.                   Brand Name Dispense Instructions for use Diagnosis    ASPIR-81 PO      None Entered        atenolol 50 MG tablet    TENORMIN    90 tablet    TAKE 1 TABLET (50 MG) BY MOUTH DAILY    Essential hypertension, benign       cholecalciferol 1000 units capsule    vitamin  -D     Take 1 capsule by mouth daily. Patient takes (2) capsules daily.        clotrimazole 1 % cream    LOTRIMIN    60 g    Apply topically 2 times daily    Yeast infection of the skin       fish oil-omega-3 fatty acids 1000 MG capsule      Take 2 g by mouth daily. Patient takes 2-capsules daily.        furosemide 20 MG tablet    LASIX    90 tablet    Take 1 tablet (20 mg) by mouth daily    Essential hypertension, benign        hydrochlorothiazide 12.5 MG Tabs tablet     90 tablet    Take 1 tablet (12.5 mg) by mouth daily    Essential hypertension, benign       ibuprofen 400 MG tablet    ADVIL/MOTRIN    30 tablet    Take 1 tablet (400 mg) by mouth every 6 hours as needed for moderate pain        lisinopril 10 MG tablet    PRINIVIL/ZESTRIL    90 tablet    TAKE 1 TABLET (10 MG) BY MOUTH DAILY    Essential hypertension, benign       mupirocin 2 % ointment    BACTROBAN    22 g    Use 2 times a day to nose    Dermatitis       nystatin ointment    MYCOSTATIN    30 g    Twice daily to groin rash until clear, then as needed.    Intertrigo       OCUVITE-LUTEIN PO      Take  by mouth. Patient takes 2 tabs daily.        simvastatin 10 MG tablet    ZOCOR    90 tablet    Take 1 tablet (10 mg) by mouth At Bedtime    Mixed hyperlipidemia       triamcinolone 0.1 % ointment    KENALOG    80 g    To finger rash twice daily    Dermatitis

## 2018-11-06 NOTE — PROGRESS NOTES
"  SUBJECTIVE:   Tanya Graham is a 87 year old female who presents for Preventive Visit.    Are you in the first 12 months of your Medicare Part B coverage?  No    Physical Health:    In general, how would you rate your overall physical health? good    Outside of work, how many days during the week do you exercise? none    Outside of work, approximately how many minutes a day do you exercise?not applicable    If you drink alcohol do you typically have >3 drinks per day or >7 drinks per week? No    Do you usually eat at least 4 servings of fruit and vegetables a day, include whole grains & fiber and avoid regularly eating high fat or \"junk\" foods? Yes    Do you have any problems taking medications regularly?  No    Do you have any side effects from medications? none    Needs assistance for the following daily activities: no assistance needed    Which of the following safety concerns are present in your home?:  lack of grab bars in the bathroom     Hearing impairment: Yes, Feel that people are mumbling or not speaking clearly.    Need to ask people to speak up or repeat themselves.    Difficulty understanding soft or whispered speech.    In the past 6 months, have you been bothered by leaking of urine? no    Mental Health:    In general, how would you rate your overall mental or emotional health? excellent  PHQ-2 Score:    Additional concerns to address?  No  Fall risk:      Fallen 2 or more times in the past year?: No  Any fall with injury in the past year?: No    COGNITIVE SCREEN  1) Repeat 3 items (Leader, Season, Table)    2) Clock draw: NORMAL  3) 3 item recall: Recalls 2 objects   Results: NORMAL clock, 1-2 items recalled: COGNITIVE IMPAIRMENT LESS LIKELY    Mini-CogTM Copyright SAMI Kerr. Licensed by the author for use in [facility]; reprinted with permission ([email or web address]). All rights reserved.      Hypertension    BP Readings from Last 3 Encounters:   11/06/18 106/60   08/08/18 130/80   05/07/18 " 122/68     She checks her BP at home and presents log of home BP readings.     Hyperlipidemia  Recent Labs   Lab Test  11/28/17   0907  10/10/16   0846  05/09/14   0900  08/22/13   0819   CHOL  166  166  161  175   HDL  49*  46*  36*  44*   LDL  93  94  87  104   TRIG  119  128  187*  136   CHOLHDLRATIO   --    --   4.4  4.0     LDL controlled with simvastatin 10 mg daily. She is taking 81 mg of aspirin daily. She was not started on it by cardiology. Denies any abdominal pains.   Hx of BCC  Patient was seen by dermatology recently for a skin check and had 2 biopsies that were positive for basal cell carcinoma. She was last seen by dermatology on 10/15/2018 and will have to return for mohs surgery.  Lower extremity edema  Notes occasional swelling of the feet and ankles. She takes lasix 20 mg daily.   Osteopenia  She does not take her vitamin D supplement daily. She drinks milk daily and is working up to 3 glasses a day. Last DEXA scan was 9/14/2017.   Hx of breast cancer   Last mammogram was 10/1/2018--negative. She performs self breast exams regularly.   Other problems to add on...  -Notes occasional bladder leakage. She will wake up once at night to urinate. She does not wear Depends or pads.   -She is requesting a handicap permit. Notes that she uses her cane at all times as she has fallen in the past. Her balance has not worsened but she tries to remain cautious at al times.     Reviewed and updated as needed this visit by clinical staff  Tobacco  Allergies  Meds  Med Hx  Surg Hx  Fam Hx  Soc Hx        Reviewed and updated as needed this visit by Provider        Social History   Substance Use Topics     Smoking status: Never Smoker     Smokeless tobacco: Never Used     Alcohol use No                         Do you feel safe in your environment - Yes    Do you have a Health Care Directive?: Yes: Patient states has Advance Directive and will bring in a copy to clinic.    Current providers sharing in care for  this patient include:   Patient Care Team:  Miriam Bird MD as PCP - General (Family Practice)  Miriam Bird MD (Family Practice)    The following health maintenance items are reviewed in Epic and correct as of today:  Health Maintenance   Topic Date Due     INFLUENZA VACCINE (1) 09/01/2018     FALL RISK ASSESSMENT  11/27/2018     PHQ-2 Q1 YR  11/27/2018     MEDICARE ANNUAL WELLNESS VISIT  11/27/2018     MAMMO Q1 YR  10/01/2019     DEXA Q3 YR  09/14/2020     ADVANCE DIRECTIVE PLANNING Q5 YRS  04/23/2023     TETANUS IMMUNIZATION (SYSTEM ASSIGNED)  11/27/2027     PNEUMOCOCCAL  Completed     Labs reviewed in EPIC  BP Readings from Last 3 Encounters:   11/06/18 106/60   08/08/18 130/80   05/07/18 122/68    Wt Readings from Last 3 Encounters:   11/06/18 82.3 kg (181 lb 8 oz)   04/23/18 83.7 kg (184 lb 8 oz)   11/27/17 83.4 kg (183 lb 14.4 oz)          Recent Labs   Lab Test  11/28/17   0907  10/10/16   0846   05/09/14   0900   LDL  93  94   --   87   HDL  49*  46*   --   36*   TRIG  119  128   --   187*   ALT  21  19   --   24   CR  0.73  0.69   < >  0.65   GFRESTIMATED  76  81   < >  87   GFRESTBLACK  >90  >90  African American GFR Calc     < >  >90   POTASSIUM  4.9  4.6   --   4.5   TSH  2.87   --    --   3.49    < > = values in this interval not displayed.      Pneumonia Vaccine:up to date    ROS:  Constitutional, HEENT, cardiovascular, pulmonary, GI, , musculoskeletal, neuro, skin, endocrine and psych systems are negative, except as otherwise noted.    This document serves as a record of the services and decisions personally performed and made by Miriam Bird MD. It was created on her behalf by Soco Means, a trained medical scribe. The creation of this document is based on the provider's statements to the medical scribe.  oSco Means November 6, 2018 9:58 AM     OBJECTIVE:   /60 (BP Location: Right arm, Patient Position: Sitting, Cuff Size: Adult Regular)  Pulse 60  Temp 98.1  " F (36.7  C) (Oral)  Resp 20  Wt 181 lb 8 oz (82.3 kg)  LMP 06/02/1981  BMI 32.41 kg/m2 Estimated body mass index is 32.41 kg/(m^2) as calculated from the following:    Height as of 4/23/18: 5' 2.75\" (1.594 m).    Weight as of this encounter: 181 lb 8 oz (82.3 kg).     EXAM:   GENERAL APPEARANCE: healthy, alert and no distress  EYES: Eyes grossly normal to inspection, PERRL and conjunctivae and sclerae normal  HENT: ear canals and TM's normal, nose and mouth without ulcers or lesions, oropharynx clear and oral mucous membranes moist  NECK: no adenopathy, no asymmetry, masses, or scars and thyroid normal to palpation  RESP: lungs clear to auscultation - no rales, rhonchi or wheezes  BREAST: normal without masses, tenderness or nipple discharge and no palpable axillary masses or adenopathy  CV: regular rate and rhythm, normal S1 S2, no S3 or S4, no murmur, click or rub, no peripheral edema and peripheral pulses strong  ABDOMEN: soft, nontender, no hepatosplenomegaly, no masses and bowel sounds normal  MS: no musculoskeletal defects are noted and gait is age appropriate without ataxia  SKIN: no suspicious lesions or rashes  NEURO: Normal strength and tone, sensory exam grossly normal, mentation intact and speech normal  PSYCH: mentation appears normal and affect normal/bright    Diagnostic Test Results:  No results found for this or any previous visit (from the past 24 hour(s)).    ASSESSMENT / PLAN:   (Z00.00) Medicare annual wellness visit, subsequent  (primary encounter diagnosis)  Comment: Patient overall doing well. Normal physical exam. Fasting lab work up today  Plan: Comprehensive metabolic panel          (I10) Essential hypertension, benign  Comment: BP at target. Continue current meds.  Plan: atenolol (TENORMIN) 50 MG tablet, furosemide         (LASIX) 20 MG tablet, hydrochlorothiazide 12.5         MG TABS tablet, lisinopril (PRINIVIL/ZESTRIL)         10 MG tablet, Comprehensive metabolic panel,         " "TSH with free T4 reflex, CBC with platelets          (E78.2) Mixed hyperlipidemia  Comment: Checking LDL today. If within target range, continue simvastatin. Discussed pros vs cons of disconitnuing aspirin. Okay to discontinue aspirin as hypertension and hyperlipidemia are controlled.   Plan: simvastatin (ZOCOR) 10 MG tablet, Lipid panel         reflex to direct LDL Fasting          (Z23) Need for prophylactic vaccination and inoculation against influenza  Plan: FLU VACCINE, INCREASED ANTIGEN, PRESV FREE, AGE        65+ [59060], Vaccine Administration, Initial         [53956]          (M85.80) Osteopenia, unspecified location  Comment: Checking vitamin D levels today. Aim for increasing milk to 3 glasses a day. Continue with vitamin D supplement.   Plan: Vitamin D Deficiency          Follow up in 6 months    End of Life Planning:  Patient currently has an advanced directive: Yes.  Practitioner is supportive of decision.    COUNSELING:  Reviewed preventive health counseling, as reflected in patient instructions       Regular exercise       Healthy diet/nutrition       Immunizations    Vaccinated for: Influenza         Osteoporosis Prevention/Bone Health    BP Readings from Last 1 Encounters:   11/06/18 106/60     Estimated body mass index is 32.41 kg/(m^2) as calculated from the following:    Height as of 4/23/18: 5' 2.75\" (1.594 m).    Weight as of this encounter: 181 lb 8 oz (82.3 kg).  Weight management plan: Discussed healthy diet and exercise guidelines and patient will follow up in 12 months in clinic to re-evaluate.   reports that she has never smoked. She has never used smokeless tobacco.    Appropriate preventive services were discussed with this patient, including applicable screening as appropriate for cardiovascular disease, diabetes, osteopenia/osteoporosis, and glaucoma.  As appropriate for age/gender, discussed screening for colorectal cancer, prostate cancer, breast cancer, and cervical cancer. " Checklist reviewing preventive services available has been given to the patient.    Reviewed patients plan of care and provided an AVS. The Basic Care Plan (routine screening as documented in Health Maintenance) for Tanya meets the Care Plan requirement. This Care Plan has been established and reviewed with the Patient.    Counseling Resources:  ATP IV Guidelines  Pooled Cohorts Equation Calculator  Breast Cancer Risk Calculator  FRAX Risk Assessment  ICSI Preventive Guidelines  Dietary Guidelines for Americans, 2010  [employer]oup[employer] MyPlate  ASA Prophylaxis  Lung CA Screening    The information in this document, created by the medical scribe for me, accurately reflects the services I personally performed and the decisions made by me. I have reviewed and approved this document for accuracy prior to leaving the patient care area.  November 6, 2018 9:58 AM    Miriam Bird MD  [facility]    Injectable Influenza Immunization Documentation    1.  Is the person to be vaccinated sick today?   No    2. Does the person to be vaccinated have an allergy to a component   of the vaccine?   No  Egg Allergy Algorithm Link    3. Has the person to be vaccinated ever had a serious reaction   to influenza vaccine in the past?   No    4. Has the person to be vaccinated ever had Guillain-Barré syndrome?   No    Form completed by patient

## 2018-11-06 NOTE — PATIENT INSTRUCTIONS
Services Typically covered by Medicare Recommended Completed   Vaccines    Pneumonoccol    Influenza    Hepatitis B (if medium/high risk)     Once for patients after age 65    Yearly  Medium/high risk factors:    End Stage Kidney Disease    Hemophiliacs who received Factor XIII or IX concentrates    Clients of institutions for developmentally disabled    Persons who live in same house as a Hepatitis B carrier    Homosexual men    Illicit injectable drug users    Health care workers     Mammogram Covered: One-time screen between age 35-39, annually for age 40+     Pap and Pelvic Exam Covered: Annually if  high risk,  or childbearing age with abnormal Pap in last 3 years.  Q24 months for all other women     Prostate Cancer Screening    Digital rectal exam    PSA Covered: Annually for all men > age 50     Corolrectal Cancer Screening Screening colonoscopy every 10 years, more often for high risk patients     Diabetes Self-Management Training Requires referral by treating physician for patient with diabetes     Diabetes Screening    Fasting blood sugar or glucose tolerance test   Once yearly, twice yearly if prediabetic     Cardiovascular Screening Blood Tests    Total Cholesterol    HDL    Triglycerides Every 5 years     Medical Nutrition Therapy for Diabetes or Renal Disease Requires referral by treating physician for patient with diabetes or kidney disease     Glaucoma Screening Annually for patients with one of the following risk factors:    Diabetes Mellitus    Family history of Glaucoma    -American age 50 and over    -American age 65 and over     Bone Mass Measurement Every 24 months if one of the following risk factors:    Estrogen deficiency    Vertebral abnormalities on x-ray indicative of Osteoporosis, Osteopenia, or Vertebral fracture    Receiving/expected to receive the equivalent of at least 5 mg of Prednisone per day for > 3 months    Hyperparathyroidism    Patient being monitored for  response to Osteoporosis Therapy     One-time AAA screen  Must be ordered as part of Medicare IPPE   Any patient with a family history of AAA    Males Age 65-75, with history of smoking at least 100 cigarettes in lifetime     Smoking Cessation Counseling Beneficiaries who use tobacco are eligible to receive 2 cessation attempts per year; each attempt includes maximum of 4 sessions     HIV Screening Annually for beneficiaries at increased risk:       Increased risk for HIV infection is defined in the  National Coverage Determinations (NCD) Manual,  Publication 100-03 Sections 190.14 (diagnostic) and 210.7 (screening). See http://www.cms.gov/manuals/downloads/prj756f0_Vlyi1.pdf and http://www.cms.gov/manuals/downloads/ggs831v9_Vews0.pdf on the Internet.  Three times per pregnancy for beneficiaries who are pregnant.     Future Annual Wellness Visit Annually, for all beneficiaries.

## 2018-12-26 DIAGNOSIS — E78.2 MIXED HYPERLIPIDEMIA: ICD-10-CM

## 2018-12-26 DIAGNOSIS — M85.80 OSTEOPENIA, UNSPECIFIED LOCATION: ICD-10-CM

## 2018-12-26 DIAGNOSIS — Z00.00 MEDICARE ANNUAL WELLNESS VISIT, SUBSEQUENT: ICD-10-CM

## 2018-12-26 DIAGNOSIS — I10 ESSENTIAL HYPERTENSION, BENIGN: ICD-10-CM

## 2018-12-26 LAB
ALBUMIN SERPL-MCNC: 3.5 G/DL (ref 3.4–5)
ALP SERPL-CCNC: 103 U/L (ref 40–150)
ALT SERPL W P-5'-P-CCNC: 19 U/L (ref 0–50)
ANION GAP SERPL CALCULATED.3IONS-SCNC: 6 MMOL/L (ref 3–14)
AST SERPL W P-5'-P-CCNC: 26 U/L (ref 0–45)
BILIRUB SERPL-MCNC: 0.4 MG/DL (ref 0.2–1.3)
BUN SERPL-MCNC: 18 MG/DL (ref 7–30)
CALCIUM SERPL-MCNC: 8.8 MG/DL (ref 8.5–10.1)
CHLORIDE SERPL-SCNC: 106 MMOL/L (ref 94–109)
CHOLEST SERPL-MCNC: 154 MG/DL
CO2 SERPL-SCNC: 30 MMOL/L (ref 20–32)
CREAT SERPL-MCNC: 0.67 MG/DL (ref 0.52–1.04)
ERYTHROCYTE [DISTWIDTH] IN BLOOD BY AUTOMATED COUNT: 14.1 % (ref 10–15)
GFR SERPL CREATININE-BSD FRML MDRD: 78 ML/MIN/{1.73_M2}
GLUCOSE SERPL-MCNC: 94 MG/DL (ref 70–99)
HCT VFR BLD AUTO: 42 % (ref 35–47)
HDLC SERPL-MCNC: 45 MG/DL
HGB BLD-MCNC: 13.4 G/DL (ref 11.7–15.7)
LDLC SERPL CALC-MCNC: 85 MG/DL
MCH RBC QN AUTO: 30 PG (ref 26.5–33)
MCHC RBC AUTO-ENTMCNC: 31.9 G/DL (ref 31.5–36.5)
MCV RBC AUTO: 94 FL (ref 78–100)
NONHDLC SERPL-MCNC: 109 MG/DL
PLATELET # BLD AUTO: 153 10E9/L (ref 150–450)
POTASSIUM SERPL-SCNC: 4.7 MMOL/L (ref 3.4–5.3)
PROT SERPL-MCNC: 7.3 G/DL (ref 6.8–8.8)
RBC # BLD AUTO: 4.46 10E12/L (ref 3.8–5.2)
SODIUM SERPL-SCNC: 142 MMOL/L (ref 133–144)
TRIGL SERPL-MCNC: 122 MG/DL
TSH SERPL DL<=0.005 MIU/L-ACNC: 3.74 MU/L (ref 0.4–4)
WBC # BLD AUTO: 5.9 10E9/L (ref 4–11)

## 2018-12-26 PROCEDURE — 80053 COMPREHEN METABOLIC PANEL: CPT | Performed by: FAMILY MEDICINE

## 2018-12-26 PROCEDURE — 80061 LIPID PANEL: CPT | Performed by: FAMILY MEDICINE

## 2018-12-26 PROCEDURE — 84443 ASSAY THYROID STIM HORMONE: CPT | Performed by: FAMILY MEDICINE

## 2018-12-26 PROCEDURE — 82306 VITAMIN D 25 HYDROXY: CPT | Performed by: FAMILY MEDICINE

## 2018-12-26 PROCEDURE — 36415 COLL VENOUS BLD VENIPUNCTURE: CPT | Performed by: FAMILY MEDICINE

## 2018-12-26 PROCEDURE — 85027 COMPLETE CBC AUTOMATED: CPT | Performed by: FAMILY MEDICINE

## 2018-12-27 LAB — DEPRECATED CALCIDIOL+CALCIFEROL SERPL-MC: 19 UG/L (ref 20–75)

## 2019-04-02 DIAGNOSIS — I10 ESSENTIAL HYPERTENSION, BENIGN: ICD-10-CM

## 2019-04-02 RX ORDER — HYDROCHLOROTHIAZIDE 12.5 MG/1
12.5 TABLET ORAL DAILY
Qty: 90 TABLET | Refills: 0 | Status: SHIPPED | OUTPATIENT
Start: 2019-04-02 | End: 2019-06-19

## 2019-04-02 NOTE — TELEPHONE ENCOUNTER
"Requested Prescriptions   Pending Prescriptions Disp Refills     hydrochlorothiazide (HYDRODIURIL) 12.5 MG tablet [Pharmacy Med Name: HYDROCHLOROTHIAZIDE 12.5 MG TB]  Last Written Prescription Date:  11/6/18  Last Fill Quantity: 90 tablet,  # refills: 1   Last office visit: 11/6/2018 with prescribing provider:  11/6/18   Future Office Visit:   Next 5 appointments (look out 90 days)    May 07, 2019 10:20 AM CDT  SHORT with Miiram Bird MD  [facility] [address] [place] [address], [address]  [facility] [address] [phone]          90 tablet 0     Sig: TAKE 1 TABLET (12.5 MG) BY MOUTH DAILY    Diuretics (Including Combos) Protocol Passed - 4/2/2019  3:12 PM       Passed - Blood pressure under 140/90 in past 12 months    BP Readings from Last 3 Encounters:   11/06/18 106/60   08/08/18 130/80   05/07/18 122/68          Passed - Recent (12 mo) or future (30 days) visit within the authorizing provider's specialty    Patient had office visit in the last 12 months or has a visit in the next 30 days with authorizing provider or within the authorizing provider's specialty.  See \"Patient Info\" tab in inbasket, or \"Choose Columns\" in Meds & Orders section of the refill encounter.             Passed - Medication is active on med list       Passed - Patient is age 18 or older       Passed - No active pregancy on record       Passed - Normal serum creatinine on file in past 12 months    Recent Labs   Lab Test 12/26/18  0852   CR 0.67             Passed - Normal serum potassium on file in past 12 months    Recent Labs   Lab Test 12/26/18  0852   POTASSIUM 4.7                   Passed - Normal serum sodium on file in past 12 months    Recent Labs   Lab Test 12/26/18  0852                Passed - No positive pregnancy test in past 12 months          "

## 2019-04-02 NOTE — TELEPHONE ENCOUNTER
Return in about 6 months (around 5/6/2019) for blood pressure check.  Prescription approved per [facility] Refill Protocol  Kaela Abdul RN BS

## 2019-05-07 ENCOUNTER — OFFICE VISIT (OUTPATIENT)
Dept: FAMILY MEDICINE | Facility: CLINIC | Age: 84
End: 2019-05-07
Payer: MEDICARE

## 2019-05-07 VITALS
SYSTOLIC BLOOD PRESSURE: 120 MMHG | TEMPERATURE: 98.1 F | BODY MASS INDEX: 32.11 KG/M2 | WEIGHT: 181.2 LBS | DIASTOLIC BLOOD PRESSURE: 62 MMHG | HEART RATE: 60 BPM | HEIGHT: 63 IN | RESPIRATION RATE: 16 BRPM

## 2019-05-07 DIAGNOSIS — M85.859 OSTEOPENIA OF NECK OF FEMUR, UNSPECIFIED LATERALITY: ICD-10-CM

## 2019-05-07 DIAGNOSIS — I10 ESSENTIAL HYPERTENSION, BENIGN: Primary | ICD-10-CM

## 2019-05-07 DIAGNOSIS — I72.0 CAROTID ANEURYSM, LEFT (H): ICD-10-CM

## 2019-05-07 PROCEDURE — 99214 OFFICE O/P EST MOD 30 MIN: CPT | Performed by: FAMILY MEDICINE

## 2019-05-07 RX ORDER — LISINOPRIL 10 MG/1
TABLET ORAL
Qty: 90 TABLET | Refills: 1 | Status: SHIPPED | OUTPATIENT
Start: 2019-05-07 | End: 2019-11-07

## 2019-05-07 RX ORDER — ATENOLOL 50 MG/1
TABLET ORAL
Qty: 90 TABLET | Refills: 1 | Status: SHIPPED | OUTPATIENT
Start: 2019-05-07 | End: 2019-11-07

## 2019-05-07 ASSESSMENT — MIFFLIN-ST. JEOR: SCORE: 1225.01

## 2019-05-07 NOTE — PROGRESS NOTES
SUBJECTIVE:   Tanya Graham is a 88 year old female who presents to clinic today for the following   health issues:    Hypertension Follow-up      Outpatient blood pressures are being checked at home.  Results are 121-131/56-64.    Low Salt Diet: not monitoring salt    Amount of exercise or physical activity: None    Problems taking medications regularly: No    Medication side effects: none    Diet: regular (no restrictions)    BP Readings from Last 3 Encounters:   05/07/19 120/62   11/06/18 106/60   08/08/18 130/80     BP stable today. She brings a log of home BP readings which have been normal. She is taking atenolol 50 mg daily, hydrochlorothiazide 12.5 mg daily, and lisinopril 10 mg daily.     Lower extremity edema   She will take Lasix prn for ankle and feet swelling. Edema has been fairly controlled with addition of hydrochlorothiazide 12.5 mg daily.     Hyperlipidemia    Recent Labs   Lab Test 12/26/18  0852 11/28/17  0907  05/09/14  0900 08/22/13  0819   CHOL 154 166   < > 161 175   HDL 45* 49*   < > 36* 44*   LDL 85 93   < > 87 104   TRIG 122 119   < > 187* 136   CHOLHDLRATIO  --   --   --  4.4 4.0    < > = values in this interval not displayed.     Taking simvastatin 10 mg daily.     Other problems to add on...  -Notes that her children have commented that she breaths heavily when she walks or is active. She does not go on a walk daily. She lives alone with her dog. She continues to drive herself around and maintains her house, including yard work.   -She inquires about measles vaccine. Declines shingrix due to cost  -Notes that she has a hx of carotid aneurysm and has not had this checked recently. She will experience occasional neck pains.       Additional history: as documented    Reviewed  and updated as needed this visit by clinical staff  Tobacco  Allergies  Meds  Problems  Med Hx  Surg Hx  Fam Hx  Soc Hx          Reviewed and updated as needed this visit by Provider  Allergies  Meds   "Problems  Med Hx  Surg Hx         Recent Labs   Lab Test 12/26/18  0852 11/28/17  0907 10/10/16  0846   LDL 85 93 94   HDL 45* 49* 46*   TRIG 122 119 128   ALT 19 21 19   CR 0.67 0.73 0.69   GFRESTIMATED 78 76 81   GFRESTBLACK >90 >90 >90  African American GFR Calc     POTASSIUM 4.7 4.9 4.6   TSH 3.74 2.87  --       BP Readings from Last 3 Encounters:   05/07/19 120/62   11/06/18 106/60   08/08/18 130/80    Wt Readings from Last 3 Encounters:   05/07/19 82.2 kg (181 lb 3.2 oz)   11/06/18 82.3 kg (181 lb 8 oz)   04/23/18 83.7 kg (184 lb 8 oz)           ROS:  Constitutional, HEENT, cardiovascular, pulmonary, gi and gu systems are negative, except as otherwise noted.    This document serves as a record of the services and decisions personally performed and made by Miriam Bird MD. It was created on her behalf by Soco Means, a trained medical scribe. The creation of this document is based on the provider's statements to the medical scribe.  Soco Means May 7, 2019 10:50 AM     OBJECTIVE:     /62 (BP Location: Right arm, Patient Position: Sitting, Cuff Size: Adult Regular)   Pulse 60   Temp 98.1  F (36.7  C) (Oral)   Resp 16   Ht 1.607 m (5' 3.25\")   Wt 82.2 kg (181 lb 3.2 oz)   LMP 06/02/1981   BMI 31.84 kg/m    Body mass index is 31.84 kg/m .     GENERAL: healthy, alert and no distress  RESP: lungs clear to auscultation - no rales, rhonchi or wheezes  CV: regular rate and rhythm, normal S1 S2, no S3 or S4, no murmur, click or rub, no peripheral edema and peripheral pulses strong  MS: no gross musculoskeletal defects noted, no edema  SKIN: no suspicious lesions or rashes  NEURO: Normal strength and tone, mentation intact and speech normal  PSYCH: mentation appears normal, affect normal/bright    Diagnostic Test Results:  none     ASSESSMENT/PLAN:   (I10) Essential hypertension, benign  (primary encounter diagnosis)  Comment: BP at target. Continue current meds.  Plan: lisinopril " (PRINIVIL/ZESTRIL) 10 MG tablet,         atenolol (TENORMIN) 50 MG tablet          (I72.0) Carotid aneurysm, left (H)  Comment: Carotid US ordered today. Last carotid US was in 2011. No sx  Plan: US Carotid Bilateral          (M85.859) Osteopenia of neck of femur, unspecified laterality  Comment: Last DEXA scan was 9/14/2017, not due. Taking vitamin D but no calcium supplement. Incorporates calcium in diet. Encouraged more activity    Loud breathing per hx, suspect deconditioning, normal exam, encouraged activity    Discussed that she may discontinue mammograms due to age. Will continue with annual breast exams. Pt agrees    Follow up in 6 months    The information in this document, created by the medical scribe for me, accurately reflects the services I personally performed and the decisions made by me. I have reviewed and approved this document for accuracy prior to leaving the patient care area.  May 7, 2019 11:03 AM    Miriam Bird MD  [facility]

## 2019-05-14 ENCOUNTER — HOSPITAL ENCOUNTER (OUTPATIENT)
Dept: ULTRASOUND IMAGING | Facility: CLINIC | Age: 84
Discharge: HOME | End: 2019-05-14
Attending: FAMILY MEDICINE | Admitting: FAMILY MEDICINE
Payer: MEDICARE

## 2019-05-14 DIAGNOSIS — I72.0 CAROTID ANEURYSM, LEFT (H): ICD-10-CM

## 2019-05-14 PROCEDURE — 93880 EXTRACRANIAL BILAT STUDY: CPT

## 2019-06-19 DIAGNOSIS — I10 ESSENTIAL HYPERTENSION, BENIGN: ICD-10-CM

## 2019-06-19 NOTE — TELEPHONE ENCOUNTER
"Requested Prescriptions   Pending Prescriptions Disp Refills     hydrochlorothiazide (HYDRODIURIL) 12.5 MG tablet [Pharmacy Med Name: HYDROCHLOROTHIAZIDE 12.5 MG TB] 90 tablet 0     Sig: TAKE 1 TABLET (12.5 MG) BY MOUTH DAILY   Last Written Prescription Date:  4/2/19  Last Fill Quantity: 90,  # refills: 0   Last Office Visit: 5/7/2019 [facility]      Return in about 6 months (around 11/7/2019) for wellness exam.     Future Office Visit:         Diuretics (Including Combos) Protocol Passed - 6/19/2019  9:54 AM        Passed - Blood pressure under 140/90 in past 12 months     BP Readings from Last 3 Encounters:   05/07/19 120/62   11/06/18 106/60   08/08/18 130/80                 Passed - Recent (12 mo) or future (30 days) visit within the authorizing provider's specialty     Patient had office visit in the last 12 months or has a visit in the next 30 days with authorizing provider or within the authorizing provider's specialty.  See \"Patient Info\" tab in inbasket, or \"Choose Columns\" in Meds & Orders section of the refill encounter.              Passed - Medication is active on med list        Passed - Patient is age 18 or older        Passed - No active pregancy on record        Passed - Normal serum creatinine on file in past 12 months     Recent Labs   Lab Test 12/26/18  0852   CR 0.67              Passed - Normal serum potassium on file in past 12 months     Recent Labs   Lab Test 12/26/18  0852   POTASSIUM 4.7                    Passed - Normal serum sodium on file in past 12 months     Recent Labs   Lab Test 12/26/18  0852                 Passed - No positive pregnancy test in past 12 months        "

## 2019-06-20 RX ORDER — HYDROCHLOROTHIAZIDE 12.5 MG/1
12.5 TABLET ORAL DAILY
Qty: 90 TABLET | Refills: 1 | Status: SHIPPED | OUTPATIENT
Start: 2019-06-20 | End: 2019-11-07

## 2019-06-20 NOTE — TELEPHONE ENCOUNTER
Prescription approved per [facility] Refill Protocol  Kaela Abdul RN BS

## 2019-07-01 ENCOUNTER — TRANSFERRED RECORDS (OUTPATIENT)
Dept: HEALTH INFORMATION MANAGEMENT | Facility: CLINIC | Age: 84
End: 2019-07-01

## 2019-07-22 ENCOUNTER — TRANSFERRED RECORDS (OUTPATIENT)
Dept: HEALTH INFORMATION MANAGEMENT | Facility: CLINIC | Age: 84
End: 2019-07-22

## 2019-09-25 ENCOUNTER — TELEPHONE (OUTPATIENT)
Dept: FAMILY MEDICINE | Facility: CLINIC | Age: 84
End: 2019-09-25

## 2019-09-25 NOTE — TELEPHONE ENCOUNTER
Routing to MA/TC pool.     Orthology called regarding the form from 7/22/19. They state it was never signed and faxed over to them, however it is scanned into the chart (unsigned)    Can you please print and prepare these forms for Dr. Bird to sign, then fax to Orthology ASAP as they have been waiting for this since July??     Thank you!    Vania Marr RN -- [facility] Workforce

## 2019-09-25 NOTE — TELEPHONE ENCOUNTER
Reason for Call:  Form, our goal is to have forms completed with 72 hours, however, some forms may require a visit or additional information.    Type of letter, form or note:  Orders    Who is the form from?: Physical Therapy (if other please explain)    Where did the form come from: form was faxed in    What clinic location was the form placed at?: [facility]     Where the form was placed: Dr Bird Box/Folder    What number is listed as a contact on the form?: [phone]       Additional comments: Please review paperwork and sign / date page 1 and fax to [phone]    Call taken on 9/25/2019 at 12:01 PM by Gisell Tinoco

## 2019-10-10 ENCOUNTER — TRANSFERRED RECORDS (OUTPATIENT)
Dept: HEALTH INFORMATION MANAGEMENT | Facility: CLINIC | Age: 84
End: 2019-10-10

## 2019-10-28 DIAGNOSIS — I10 ESSENTIAL HYPERTENSION, BENIGN: ICD-10-CM

## 2019-10-28 NOTE — TELEPHONE ENCOUNTER
"Requested Prescriptions   Pending Prescriptions Disp Refills     furosemide (LASIX) 20 MG tablet [Pharmacy Med Name: FUROSEMIDE 20 MG TABLET] 90 tablet 3     Sig: TAKE 1 TABLET BY MOUTH EVERY DAY   Last Written Prescription Date:  11/6/18  Last Fill Quantity: 90,  # refills: 3   Last Office Visit: 5/7/2019 [facility]      Return in about 6 months (around 11/7/2019) for wellness exam.     Future Office Visit:    Next 5 appointments (look out 90 days)    Nov 07, 2019 10:00 AM CST  PHYSICAL with Miriam Bird MD  [facility] [address] [place] [address], [address]  [facility] [address] [phone]             Diuretics (Including Combos) Protocol Passed - 10/28/2019  1:50 AM        Passed - Blood pressure under 140/90 in past 12 months     BP Readings from Last 3 Encounters:   05/07/19 120/62   11/06/18 106/60   08/08/18 130/80                 Passed - Recent (12 mo) or future (30 days) visit within the authorizing provider's specialty     Patient has had an office visit with the authorizing provider or a provider within the authorizing providers department within the previous 12 mos or has a future within next 30 days. See \"Patient Info\" tab in inbasket, or \"Choose Columns\" in Meds & Orders section of the refill encounter.              Passed - Medication is active on med list        Passed - Patient is age 18 or older        Passed - No active pregancy on record        Passed - Normal serum creatinine on file in past 12 months     Recent Labs   Lab Test 12/26/18  0852   CR 0.67              Passed - Normal serum potassium on file in past 12 months     Recent Labs   Lab Test 12/26/18  0852   POTASSIUM 4.7                    Passed - Normal serum sodium on file in past 12 months     Recent Labs   Lab Test 12/26/18  0852                 Passed - No positive pregnancy test in past 12 months        "

## 2019-10-29 RX ORDER — FUROSEMIDE 20 MG
TABLET ORAL
Qty: 90 TABLET | Refills: 0 | Status: SHIPPED | OUTPATIENT
Start: 2019-10-29 | End: 2019-11-07

## 2019-10-29 NOTE — TELEPHONE ENCOUNTER
Medication is being filled for 1 time refill only due to:  Patient needs labs Will need labs at the end of Dec.     Vania Marr RN -- [facility] Workforce

## 2019-11-07 ENCOUNTER — OFFICE VISIT (OUTPATIENT)
Dept: FAMILY MEDICINE | Facility: CLINIC | Age: 84
End: 2019-11-07
Payer: MEDICARE

## 2019-11-07 VITALS
SYSTOLIC BLOOD PRESSURE: 120 MMHG | OXYGEN SATURATION: 97 % | BODY MASS INDEX: 32.07 KG/M2 | WEIGHT: 181 LBS | HEART RATE: 75 BPM | TEMPERATURE: 98.4 F | HEIGHT: 63 IN | RESPIRATION RATE: 16 BRPM | DIASTOLIC BLOOD PRESSURE: 74 MMHG

## 2019-11-07 DIAGNOSIS — E78.5 HYPERLIPIDEMIA LDL GOAL <130: ICD-10-CM

## 2019-11-07 DIAGNOSIS — E78.2 MIXED HYPERLIPIDEMIA: ICD-10-CM

## 2019-11-07 DIAGNOSIS — Z00.00 ENCOUNTER FOR MEDICARE ANNUAL WELLNESS EXAM: Primary | ICD-10-CM

## 2019-11-07 DIAGNOSIS — E55.9 VITAMIN D INSUFFICIENCY: ICD-10-CM

## 2019-11-07 DIAGNOSIS — Z85.828 HISTORY OF BASAL CELL CARCINOMA: ICD-10-CM

## 2019-11-07 DIAGNOSIS — I10 ESSENTIAL HYPERTENSION, BENIGN: ICD-10-CM

## 2019-11-07 DIAGNOSIS — M15.0 PRIMARY OSTEOARTHRITIS INVOLVING MULTIPLE JOINTS: ICD-10-CM

## 2019-11-07 LAB
ERYTHROCYTE [DISTWIDTH] IN BLOOD BY AUTOMATED COUNT: 13.7 % (ref 10–15)
HCT VFR BLD AUTO: 42.2 % (ref 35–47)
HGB BLD-MCNC: 13.6 G/DL (ref 11.7–15.7)
MCH RBC QN AUTO: 30.4 PG (ref 26.5–33)
MCHC RBC AUTO-ENTMCNC: 32.2 G/DL (ref 31.5–36.5)
MCV RBC AUTO: 94 FL (ref 78–100)
PLATELET # BLD AUTO: 159 10E9/L (ref 150–450)
RBC # BLD AUTO: 4.47 10E12/L (ref 3.8–5.2)
WBC # BLD AUTO: 6 10E9/L (ref 4–11)

## 2019-11-07 PROCEDURE — 80061 LIPID PANEL: CPT | Performed by: FAMILY MEDICINE

## 2019-11-07 PROCEDURE — G0439 PPPS, SUBSEQ VISIT: HCPCS | Performed by: FAMILY MEDICINE

## 2019-11-07 PROCEDURE — 90662 IIV NO PRSV INCREASED AG IM: CPT | Performed by: FAMILY MEDICINE

## 2019-11-07 PROCEDURE — 85027 COMPLETE CBC AUTOMATED: CPT | Performed by: FAMILY MEDICINE

## 2019-11-07 PROCEDURE — 80053 COMPREHEN METABOLIC PANEL: CPT | Performed by: FAMILY MEDICINE

## 2019-11-07 PROCEDURE — G0008 ADMIN INFLUENZA VIRUS VAC: HCPCS | Performed by: FAMILY MEDICINE

## 2019-11-07 PROCEDURE — 36415 COLL VENOUS BLD VENIPUNCTURE: CPT | Performed by: FAMILY MEDICINE

## 2019-11-07 RX ORDER — ATENOLOL 50 MG/1
TABLET ORAL
Qty: 90 TABLET | Refills: 1 | Status: SHIPPED | OUTPATIENT
Start: 2019-11-07 | End: 2020-06-23

## 2019-11-07 RX ORDER — FUROSEMIDE 20 MG
20 TABLET ORAL DAILY PRN
Qty: 90 TABLET | Refills: 0 | Status: SHIPPED | OUTPATIENT
Start: 2019-11-07 | End: 2020-02-04

## 2019-11-07 RX ORDER — NIACINAMIDE 500 MG
1 TABLET, EXTENDED RELEASE ORAL DAILY
Qty: 90 TABLET | Refills: 3 | Status: SHIPPED | OUTPATIENT
Start: 2019-11-07 | End: 2020-11-17

## 2019-11-07 RX ORDER — LISINOPRIL 10 MG/1
TABLET ORAL
Qty: 90 TABLET | Refills: 1 | Status: SHIPPED | OUTPATIENT
Start: 2019-11-07 | End: 2020-04-28

## 2019-11-07 RX ORDER — SIMVASTATIN 10 MG
10 TABLET ORAL AT BEDTIME
Qty: 90 TABLET | Refills: 3 | Status: SHIPPED | OUTPATIENT
Start: 2019-11-07 | End: 2020-11-17

## 2019-11-07 RX ORDER — HYDROCHLOROTHIAZIDE 12.5 MG/1
12.5 TABLET ORAL DAILY
Qty: 90 TABLET | Refills: 1 | Status: SHIPPED | OUTPATIENT
Start: 2019-11-07 | End: 2020-06-29

## 2019-11-07 ASSESSMENT — MIFFLIN-ST. JEOR: SCORE: 1220.14

## 2019-11-07 NOTE — PROGRESS NOTES
"  SUBJECTIVE:   Tanya Graham is a 88 year old female who presents for Preventive Visit.    Are you in the first 12 months of your Medicare Part B coverage?  No    Physical Health:    In general, how would you rate your overall physical health? excellent    Outside of work, how many days during the week do you exercise? 6-7 days/week    Outside of work, approximately how many minutes a day do you exercise?greater than 60 minutes    If you drink alcohol do you typically have >3 drinks per day or >7 drinks per week? No    Do you usually eat at least 4 servings of fruit and vegetables a day, include whole grains & fiber and avoid regularly eating high fat or \"junk\" foods? Yes    Do you have any problems taking medications regularly?  No    Do you have any side effects from medications? none    Needs assistance for the following daily activities: no assistance needed    Which of the following safety concerns are present in your home?  none identified     Hearing impairment: Yes, Feel that people are mumbling or not speaking clearly.    Difficult to understand a speaker at a public meeting or Mosque service.    In the past 6 months, have you been bothered by leaking of urine? no    Mental Health:    In general, how would you rate your overall mental or emotional health? excellent  PHQ-2 Score:      Do you feel safe in your environment? Yes    Have you ever done Advance Care Planning? (For example, a Health Directive, POLST, or a discussion with a medical provider or your loved ones about your wishes): Yes, patient states has an Advance Care Planning document and will bring a copy to the clinic.    Additional concerns to address?  YES, left arm and toe issue    Fall risk:  Fallen 2 or more times in the past year?: No  Any fall with injury in the past year?: No    Cognitive Screenin) Repeat 3 items (Leader, Season, Table)      2) Clock draw: ABNORMAL   3) 3 item recall: Recalls 1 object   Results: ABNORMAL clock, " 1-2 items recalled: PROBABLE COGNITIVE IMPAIRMENT, **INFORM PROVIDER**    Mini-CogTM Copyright SAMI Kerr. Licensed by the author for use in [facility]; reprinted with permission (walter@[facility]). All rights reserved.      Do you have sleep apnea, excessive snoring or daytime drowsiness?: no    Ankle edema  She does not take lasix daily, only as needed for edema (usually once a week). She takes it for 2-3 days at a time. Lasix causes urinary frequency.     Left arm numbness   Her left arm will go numb when she is on the phone for a long time. It clears after she shakes it out. This does not occur when she sleeps. Denies any coldness in her arm. Denies any neck pain. Notes that she has broken her left arm in the past.    Left toe pain  The second, third, and fourth toes of her left foot have been hurting for the past 2 months. The PIP joints are tender. It is painful to walk. There is no redness or swelling.     Hypertension    BP Readings from Last 3 Encounters:   11/07/19 120/74   05/07/19 120/62   11/06/18 106/60     Home BP readings: Mon-- 130/55 Tues-- 129/70  Wed--113/51 Thurs 118/55    BP stable with atenolol 50 mg, lisinopril 10 mg, and hydrochlorothiazide 12.5 mg daily. Patient checks her BP at home occasionally. She reports urinary frequency at night with hydrochlorothiazide.     Hyperlipidemia    Recent Labs   Lab Test 12/26/18  0852 11/28/17  0907  05/09/14  0900 08/22/13  0819   CHOL 154 166   < > 161 175   HDL 45* 49*   < > 36* 44*   LDL 85 93   < > 87 104   TRIG 122 119   < > 187* 136   CHOLHDLRATIO  --   --   --  4.4 4.0    < > = values in this interval not displayed.     Taking simvastatin 10 mg daily.     Hx of basal cell  She's had 3 basal cell lesions removed. She's following a skin specialist at [facility]. Patient is on Niacinamide 500 mg daily for skin cancer prevention and inquires if there is a smaller pill she can take because this one is too large for her.     Other problems to  add on...  -She uses a cane when ambulating to help with her balance.     Reviewed and updated as needed this visit by clinical staff  Tobacco  Allergies  Meds  Med Hx  Surg Hx  Fam Hx  Soc Hx        Reviewed and updated as needed this visit by Provider  Meds        Social History     Tobacco Use     Smoking status: Never Smoker     Smokeless tobacco: Never Used   Substance Use Topics     Alcohol use: No     Current providers sharing in care for this patient include:   Patient Care Team:  Miriam Bird MD as PCP - General (Family Practice)  Miriam Bird MD as Assigned PCP  Miriam Bird MD (Family Practice)    The following health maintenance items are reviewed in Epic and correct as of today:  Health Maintenance   Topic Date Due     ZOSTER IMMUNIZATION (1 of 2) 01/12/1981     INFLUENZA VACCINE (1) 09/01/2019     FALL RISK ASSESSMENT  11/06/2019     MAMMO SCREENING  10/01/2019     MEDICARE ANNUAL WELLNESS VISIT  11/06/2019     DEXA  09/14/2020     ADVANCE CARE PLANNING  04/23/2023     DTAP/TDAP/TD IMMUNIZATION (4 - Td) 11/27/2027     PHQ-2  Completed     PNEUMOCOCCAL IMMUNIZATION 65+ HIGH/HIGHEST RISK  Completed     IPV IMMUNIZATION  Aged Out     MENINGITIS IMMUNIZATION  Aged Out     Labs reviewed in EPIC  BP Readings from Last 3 Encounters:   11/07/19 120/74   05/07/19 120/62   11/06/18 106/60    Wt Readings from Last 3 Encounters:   11/07/19 82.1 kg (181 lb)   05/07/19 82.2 kg (181 lb 3.2 oz)   11/06/18 82.3 kg (181 lb 8 oz)          Recent Labs   Lab Test 12/26/18  0852 11/28/17  0907 10/10/16  0846   LDL 85 93 94   HDL 45* 49* 46*   TRIG 122 119 128   ALT 19 21 19   CR 0.67 0.73 0.69   GFRESTIMATED 78 76 81   GFRESTBLACK >90 >90 >90  African American GFR Calc     POTASSIUM 4.7 4.9 4.6   TSH 3.74 2.87  --       Mammogram Screening: Alternate mammogram schedule due to breast cancer history. S/p bilateral lumpectomy and radiation. No longer needs to follow with specialists.  "Last mammogram was on 10/1/2018--negative. She has elected to stop with mammograms.     ROS:  CONSTITUTIONAL: NEGATIVE for fever, chills, change in weight  INTEGUMENTARY/SKIN: NEGATIVE for worrisome rashes, moles or lesions  EYES: NEGATIVE for vision changes or irritation  ENT/MOUTH: NEGATIVE for ear, mouth and throat problems  RESP: NEGATIVE for significant cough or SOB  BREAST: NEGATIVE for masses, tenderness or discharge  CV: NEGATIVE for chest pain, palpitations or peripheral edema  GI: NEGATIVE for nausea, abdominal pain, heartburn, or change in bowel habits  : NEGATIVE for frequency, dysuria, or hematuria  MUSCULOSKELETAL: POSITIVE for left toe pains   NEURO: NEGATIVE for weakness, dizziness or paresthesias  ENDOCRINE: NEGATIVE for temperature intolerance, skin/hair changes  HEME: NEGATIVE for bleeding problems  PSYCHIATRIC: NEGATIVE for changes in mood or affect    This document serves as a record of the services and decisions personally performed and made by Miriam Bird MD. It was created on her behalf by Soco Means, a trained medical scribe. The creation of this document is based on the provider's statements to the medical scribe.  Soco Means November 7, 2019 10:20 AM     OBJECTIVE:   /74 (BP Location: Right arm, Patient Position: Chair, Cuff Size: Adult Regular)   Pulse 75   Temp 98.4  F (36.9  C) (Oral)   Resp 16   Ht 1.6 m (5' 3\")   Wt 82.1 kg (181 lb)   LMP 06/02/1981   SpO2 97%   BMI 32.06 kg/m   Estimated body mass index is 32.06 kg/m  as calculated from the following:    Height as of this encounter: 1.6 m (5' 3\").    Weight as of this encounter: 82.1 kg (181 lb).     EXAM:   GENERAL APPEARANCE: healthy, alert and no distress  EYES: Eyes grossly normal to inspection, and conjunctivae and sclerae normal  HENT: ear canals and TM's normal, nose and mouth without ulcers or lesions, oropharynx clear and oral mucous membranes moist  NECK: no adenopathy, no asymmetry, masses, or " scars and thyroid normal to palpation  RESP: lungs clear to auscultation - no rales, rhonchi or wheezes  BREAST: normal without masses, tenderness or nipple discharge and no palpable axillary masses or adenopathy  CV: regular rate and rhythm, normal S1 S2, no S3 or S4, no murmur, click or rub, no peripheral edema and peripheral pulses strong  ABDOMEN: soft, nontender, no hepatosplenomegaly, no masses and bowel sounds normal  MS: Good circulation in left hand, normal cap refill, good pulses, No redness or swelling of left toes.not tender to touch, pt with deformities c/w OA of the feet no musculoskeletal defects are noted and gait is age appropriate without ataxia  SKIN: no suspicious lesions or rashes  NEURO: Normal strength and tone, sensory exam grossly normal, mentation intact and speech normal  PSYCH: mentation appears normal and affect normal/bright    Diagnostic Test Results:  Labs reviewed in Epic  No results found for this or any previous visit (from the past 24 hour(s)).    ASSESSMENT / PLAN:   (Z00.00) Encounter for Medicare annual wellness exam  (primary encounter diagnosis)  Comment: Normal physical exam. Labs today  Plan: Lipid panel reflex to direct LDL Non-fasting,         CBC with platelets, Comprehensive metabolic         panel          (I10) Essential hypertension, benign  Comment: BP at target. Continue current meds.  Plan: lisinopril (PRINIVIL/ZESTRIL) 10 MG tablet,         hydrochlorothiazide (HYDRODIURIL) 12.5 MG         tablet, atenolol (TENORMIN) 50 MG tablet, Lipid        panel reflex to direct LDL Non-fasting, CBC         with platelets, Comprehensive metabolic panel,         furosemide (LASIX) 20 MG tablet          (E78.5) Hyperlipidemia LDL goal <130  (E78.2) Mixed hyperlipidemia  Comment: Labs today. If within target range, continue simvastatin  Plan: Lipid panel reflex to direct LDL Non-fasting  Plan: simvastatin (ZOCOR) 10 MG tablet          (E55.9) Vitamin D insufficiency  Comment:  "Continue daily vitamin D    (Z85.828) History of basal cell carcinoma  Comment: Following with a skin specialist at [facility]. Niacinamide capsules were too large, switched to tablet to break in half for easier swallowing  Plan: Niacinamide 500 MG TBCR          (M15.0) Primary osteoarthritis involving multiple joints  Comment: pain in the left toes. Offered Voltaren gel. Also recommended ES Tylenol up to 3x a day as needed for pain.   Plan: diclofenac (VOLTAREN) 1 % topical gel    Memory appears normal on further questioning          Follow up in 6 months    COUNSELING:  Reviewed preventive health counseling, as reflected in patient instructions       Regular exercise       Healthy diet/nutrition       Immunizations    Vaccinated for: Influenza        Estimated body mass index is 32.06 kg/m  as calculated from the following:    Height as of this encounter: 1.6 m (5' 3\").    Weight as of this encounter: 82.1 kg (181 lb).  Weight management plan: Discussed healthy diet and exercise guidelines   reports that she has never smoked. She has never used smokeless tobacco.    Appropriate preventive services were discussed with this patient, including applicable screening as appropriate for cardiovascular disease, diabetes, osteopenia/osteoporosis, and glaucoma.  As appropriate for age/gender, discussed screening for colorectal cancer, prostate cancer, breast cancer, and cervical cancer. Checklist reviewing preventive services available has been given to the patient.    Reviewed patients plan of care and provided an AVS. The Basic Care Plan (routine screening as documented in Health Maintenance) for Tanya meets the Care Plan requirement. This Care Plan has been established and reviewed with the Patient.    Counseling Resources:  ATP IV Guidelines  Pooled Cohorts Equation Calculator  Breast Cancer Risk Calculator  FRAX Risk Assessment  ICSI Preventive Guidelines  Dietary Guidelines for Americans, 2010  USDA's MyPlate  ASA " Prophylaxis  Lung CA Screening      The information in this document, created by the medical scribe for me, accurately reflects the services I personally performed and the decisions made by me. I have reviewed and approved this document for accuracy prior to leaving the patient care area.  November 7, 2019 10:39 AM    Miriam Bird MD  [facility]

## 2019-11-07 NOTE — PATIENT INSTRUCTIONS
Patient Education   Personalized Prevention Plan  You are due for the preventive services outlined below.  Your care team is available to assist you in scheduling these services.  If you have already completed any of these items, please share that information with your care team to update in your medical record.  Health Maintenance Due   Topic Date Due     Zoster (Shingles) Vaccine (1 of 2) 01/12/1981     Flu Vaccine (1) 09/01/2019     FALL RISK ASSESSMENT  11/06/2019     Mammogram  10/01/2019     Annual Wellness Visit  11/06/2019

## 2019-11-07 NOTE — LETTER
November 7, 2019      Tanya Graham  [address] [facility] [address]        Dear ,    We are writing to inform you of your test results.    Your cbc is normal, showing no anemia or signs of infection.     Resulted Orders   CBC with platelets   Result Value Ref Range    WBC 6.0 4.0 - 11.0 10e9/L    RBC Count 4.47 3.8 - 5.2 10e12/L    Hemoglobin 13.6 11.7 - 15.7 g/dL    Hematocrit 42.2 35.0 - 47.0 %    MCV 94 78 - 100 fl    MCH 30.4 26.5 - 33.0 pg    MCHC 32.2 31.5 - 36.5 g/dL    RDW 13.7 10.0 - 15.0 %    Platelet Count 159 150 - 450 10e9/L       If you have any questions or concerns, please call the clinic at the number listed above.       Sincerely,        Miriam Bird MD

## 2019-11-07 NOTE — LETTER
November 8, 2019      Tanya Graham  [address] [employer] [facility] [address]        Dear ,    We are writing to inform you of your test results.    Your cholesterol tests are normal.   Normal electrolyte panel. The sodium, potassium, sugar and kidneys are all normal\     Resulted Orders   Lipid panel reflex to direct LDL Non-fasting   Result Value Ref Range    Cholesterol 168 <200 mg/dL    Triglycerides 114 <150 mg/dL      Comment:      Fasting specimen    HDL Cholesterol 52 >49 mg/dL    LDL Cholesterol Calculated 93 <100 mg/dL      Comment:      Desirable:       <100 mg/dl    Non HDL Cholesterol 116 <130 mg/dL   CBC with platelets   Result Value Ref Range    WBC 6.0 4.0 - 11.0 10e9/L    RBC Count 4.47 3.8 - 5.2 10e12/L    Hemoglobin 13.6 11.7 - 15.7 g/dL    Hematocrit 42.2 35.0 - 47.0 %    MCV 94 78 - 100 fl    MCH 30.4 26.5 - 33.0 pg    MCHC 32.2 31.5 - 36.5 g/dL    RDW 13.7 10.0 - 15.0 %    Platelet Count 159 150 - 450 10e9/L   Comprehensive metabolic panel   Result Value Ref Range    Sodium 141 133 - 144 mmol/L    Potassium 4.4 3.4 - 5.3 mmol/L    Chloride 106 94 - 109 mmol/L    Carbon Dioxide 29 20 - 32 mmol/L    Anion Gap 6 3 - 14 mmol/L    Glucose 99 70 - 99 mg/dL      Comment:      Fasting specimen    Urea Nitrogen 23 7 - 30 mg/dL    Creatinine 0.73 0.52 - 1.04 mg/dL    GFR Estimate 73 >60 mL/min/[1.73_m2]      Comment:      Non  GFR Calc  Starting 12/18/2018, serum creatinine based estimated GFR (eGFR) will be   calculated using the Chronic Kidney Disease Epidemiology Collaboration   (CKD-EPI) equation.      GFR Estimate If Black 85 >60 mL/min/[1.73_m2]      Comment:       GFR Calc  Starting 12/18/2018, serum creatinine based estimated GFR (eGFR) will be   calculated using the Chronic Kidney Disease Epidemiology Collaboration   (CKD-EPI) equation.      Calcium 9.0 8.5 - 10.1 mg/dL    Bilirubin Total 0.5 0.2 - 1.3 mg/dL    Albumin 3.7 3.4 - 5.0 g/dL    Protein Total  7.3 6.8 - 8.8 g/dL    Alkaline Phosphatase 111 40 - 150 U/L    ALT 19 0 - 50 U/L    AST 23 0 - 45 U/L       If you have any questions or concerns, please call the clinic at the number listed above.       Sincerely,        Miriam Bird MD

## 2019-11-08 LAB
ALBUMIN SERPL-MCNC: 3.7 G/DL (ref 3.4–5)
ALP SERPL-CCNC: 111 U/L (ref 40–150)
ALT SERPL W P-5'-P-CCNC: 19 U/L (ref 0–50)
ANION GAP SERPL CALCULATED.3IONS-SCNC: 6 MMOL/L (ref 3–14)
AST SERPL W P-5'-P-CCNC: 23 U/L (ref 0–45)
BILIRUB SERPL-MCNC: 0.5 MG/DL (ref 0.2–1.3)
BUN SERPL-MCNC: 23 MG/DL (ref 7–30)
CALCIUM SERPL-MCNC: 9 MG/DL (ref 8.5–10.1)
CHLORIDE SERPL-SCNC: 106 MMOL/L (ref 94–109)
CHOLEST SERPL-MCNC: 168 MG/DL
CO2 SERPL-SCNC: 29 MMOL/L (ref 20–32)
CREAT SERPL-MCNC: 0.73 MG/DL (ref 0.52–1.04)
GFR SERPL CREATININE-BSD FRML MDRD: 73 ML/MIN/{1.73_M2}
GLUCOSE SERPL-MCNC: 99 MG/DL (ref 70–99)
HDLC SERPL-MCNC: 52 MG/DL
LDLC SERPL CALC-MCNC: 93 MG/DL
NONHDLC SERPL-MCNC: 116 MG/DL
POTASSIUM SERPL-SCNC: 4.4 MMOL/L (ref 3.4–5.3)
PROT SERPL-MCNC: 7.3 G/DL (ref 6.8–8.8)
SODIUM SERPL-SCNC: 141 MMOL/L (ref 133–144)
TRIGL SERPL-MCNC: 114 MG/DL

## 2019-11-13 ENCOUNTER — TELEPHONE (OUTPATIENT)
Dept: FAMILY MEDICINE | Facility: CLINIC | Age: 84
End: 2019-11-13

## 2019-11-13 NOTE — TELEPHONE ENCOUNTER
Prior Authorization Retail Medication Request    Medication/Dose: diclofenac (VOLTAREN) 1 % topical gel  ICD code (if different than what is on RX):    Previously Tried and Failed:  naproxen and CELEBREX  Rationale:  Pt needs some extra pain relief.     Insurance Name:  unknown  Insurance ID:  unknown      Pharmacy Information (if different than what is on RX)  Name:    Phone:

## 2019-11-13 NOTE — TELEPHONE ENCOUNTER
Central Prior Authorization Team   Phone: [phone]    PA Initiation    Medication: diclofenac (VOLTAREN) 1 % topical gel  Insurance Company: [employer] - Phone [phone] Fax [phone]  Pharmacy Filling the Rx: [id number] - [place], [place] - [address]  Filling Pharmacy Phone: [phone]  Filling Pharmacy Fax: [phone]  Start Date: 11/13/2019

## 2019-11-13 NOTE — TELEPHONE ENCOUNTER
Central Prior Authorization Team   Phone: [phone]    Prior Authorization Approval    Authorization Effective Date: 12/30/2018  Authorization Expiration Date: 12/31/2019  Medication: diclofenac (VOLTAREN) 1 % topical gel  Approved Dose/Quantity:   Reference #: [id number]   Insurance Company: [employer] - Phone [phone] Fax [phone]  Expected CoPay:       CoPay Card Available:      Foundation Assistance Needed:    Which Pharmacy is filling the prescription (Not needed for infusion/clinic administered): [id number] - [place], [place] - [address]  Pharmacy Notified: Yes  Patient Notified: Yes  **Instructed pharmacy to notify patient when script is ready to /ship.**    Approved today  PA Case: [id number], Status: Approved, Coverage Starts on: 12/30/2018, Coverage Ends on: 12/31/2019. Questions? Contact [phone].

## 2020-01-29 ENCOUNTER — TELEPHONE (OUTPATIENT)
Dept: FAMILY MEDICINE | Facility: CLINIC | Age: 85
End: 2020-01-29

## 2020-01-29 NOTE — TELEPHONE ENCOUNTER
Patient scheduled for     Date: 5/6/2020 Status: Scheduled   Time: 10:20 AM Length: 20   Visit Type: SHORT [929] Copay: $0.00   Provider: Miriam Bird MD Department: [facility]     LVM for patient to return call to reschedule provider not in     Appointment has been canceled.     Gisell Tinoco/

## 2020-04-25 DIAGNOSIS — I10 ESSENTIAL HYPERTENSION, BENIGN: ICD-10-CM

## 2020-04-28 RX ORDER — LISINOPRIL 10 MG/1
TABLET ORAL
Qty: 90 TABLET | Refills: 1 | Status: SHIPPED | OUTPATIENT
Start: 2020-04-28 | End: 2020-09-25

## 2020-05-04 ENCOUNTER — TELEPHONE (OUTPATIENT)
Dept: FAMILY MEDICINE | Facility: CLINIC | Age: 85
End: 2020-05-04

## 2020-05-04 NOTE — TELEPHONE ENCOUNTER
"Pt has phone visit scheduled 5/6/2020 with Dr Bird for BP follow up. Does not have access to video. Spoke to patient this morning regarding upcoming visit and patient is requesting to cancel visit. Concerned about cost of visit, reassured that visit will be charged same as office visit due to COVID-19 and that a visit is required in order to refill medications. Pt states \"I'm on the same medicine, I'm fine. If I had any side effects or issues I would contact you.\"     BP this AM 5/4/20 reported as 118/59  Denies SOB, dizziness, lightheadedness.    Last office visit with labs 11/7/2019 (all WNL). Please advise      Raquel Mejia, RN, BSN    "

## 2020-05-05 NOTE — TELEPHONE ENCOUNTER
She has medicare, so this will be covered 100%. I am not sure why she is concerned about the cost. Does it cost her anything to come in person? It is the same thing. If she is feeling fine, we can delay 3 months at the most.

## 2020-05-06 NOTE — TELEPHONE ENCOUNTER
Spoke with patient and informed her of response. She chose to delay appt 3 months. Appointment scheduled for 8/5/2020 with Dr. Bird. Advised to call clinic if any concerns come up before then.    Routing to PCP AL Mejia RN, BSN

## 2020-06-07 ENCOUNTER — HOSPITAL ENCOUNTER (EMERGENCY)
Facility: CLINIC | Age: 85
Discharge: HOME | End: 2020-06-07
Attending: EMERGENCY MEDICINE | Admitting: EMERGENCY MEDICINE
Payer: MEDICARE

## 2020-06-07 ENCOUNTER — APPOINTMENT (OUTPATIENT)
Dept: MRI IMAGING | Facility: CLINIC | Age: 85
End: 2020-06-07
Attending: EMERGENCY MEDICINE
Payer: MEDICARE

## 2020-06-07 ENCOUNTER — NURSE TRIAGE (OUTPATIENT)
Dept: NURSING | Facility: CLINIC | Age: 85
End: 2020-06-07

## 2020-06-07 VITALS
OXYGEN SATURATION: 98 % | TEMPERATURE: 98 F | DIASTOLIC BLOOD PRESSURE: 82 MMHG | RESPIRATION RATE: 16 BRPM | SYSTOLIC BLOOD PRESSURE: 141 MMHG | HEART RATE: 70 BPM

## 2020-06-07 DIAGNOSIS — I48.92 ATRIAL FLUTTER, UNSPECIFIED TYPE (H): ICD-10-CM

## 2020-06-07 DIAGNOSIS — H81.399 PERIPHERAL VERTIGO, UNSPECIFIED LATERALITY: ICD-10-CM

## 2020-06-07 DIAGNOSIS — I65.29 CAROTID ATHEROSCLEROSIS, UNSPECIFIED LATERALITY: ICD-10-CM

## 2020-06-07 DIAGNOSIS — E83.51 HYPOCALCEMIA: ICD-10-CM

## 2020-06-07 LAB
ANION GAP SERPL CALCULATED.3IONS-SCNC: 5 MMOL/L (ref 3–14)
BASOPHILS # BLD AUTO: 0 10E9/L (ref 0–0.2)
BASOPHILS NFR BLD AUTO: 0.5 %
BUN SERPL-MCNC: 18 MG/DL (ref 7–30)
CALCIUM SERPL-MCNC: 8.3 MG/DL (ref 8.5–10.1)
CHLORIDE SERPL-SCNC: 104 MMOL/L (ref 94–109)
CO2 SERPL-SCNC: 29 MMOL/L (ref 20–32)
CREAT SERPL-MCNC: 0.65 MG/DL (ref 0.52–1.04)
DIFFERENTIAL METHOD BLD: NORMAL
EOSINOPHIL # BLD AUTO: 0 10E9/L (ref 0–0.7)
EOSINOPHIL NFR BLD AUTO: 0.3 %
ERYTHROCYTE [DISTWIDTH] IN BLOOD BY AUTOMATED COUNT: 14.1 % (ref 10–15)
GFR SERPL CREATININE-BSD FRML MDRD: 79 ML/MIN/{1.73_M2}
GLUCOSE SERPL-MCNC: 123 MG/DL (ref 70–99)
HCT VFR BLD AUTO: 45.2 % (ref 35–47)
HGB BLD-MCNC: 14.3 G/DL (ref 11.7–15.7)
IMM GRANULOCYTES # BLD: 0 10E9/L (ref 0–0.4)
IMM GRANULOCYTES NFR BLD: 0.3 %
LYMPHOCYTES # BLD AUTO: 1.4 10E9/L (ref 0.8–5.3)
LYMPHOCYTES NFR BLD AUTO: 21.9 %
MAGNESIUM SERPL-MCNC: 1.8 MG/DL (ref 1.6–2.3)
MCH RBC QN AUTO: 29.9 PG (ref 26.5–33)
MCHC RBC AUTO-ENTMCNC: 31.6 G/DL (ref 31.5–36.5)
MCV RBC AUTO: 95 FL (ref 78–100)
MONOCYTES # BLD AUTO: 0.6 10E9/L (ref 0–1.3)
MONOCYTES NFR BLD AUTO: 9.5 %
NEUTROPHILS # BLD AUTO: 4.4 10E9/L (ref 1.6–8.3)
NEUTROPHILS NFR BLD AUTO: 67.5 %
NRBC # BLD AUTO: 0 10*3/UL
NRBC BLD AUTO-RTO: 0 /100
PLATELET # BLD AUTO: 158 10E9/L (ref 150–450)
POTASSIUM SERPL-SCNC: 4 MMOL/L (ref 3.4–5.3)
RBC # BLD AUTO: 4.78 10E12/L (ref 3.8–5.2)
SODIUM SERPL-SCNC: 138 MMOL/L (ref 133–144)
TROPONIN I SERPL-MCNC: <0.015 UG/L (ref 0–0.04)
TSH SERPL DL<=0.005 MIU/L-ACNC: 3.23 MU/L (ref 0.4–4)
WBC # BLD AUTO: 6.5 10E9/L (ref 4–11)

## 2020-06-07 PROCEDURE — 25000128 H RX IP 250 OP 636: Performed by: EMERGENCY MEDICINE

## 2020-06-07 PROCEDURE — 25800030 ZZH RX IP 258 OP 636: Performed by: EMERGENCY MEDICINE

## 2020-06-07 PROCEDURE — 84484 ASSAY OF TROPONIN QUANT: CPT | Performed by: EMERGENCY MEDICINE

## 2020-06-07 PROCEDURE — 25000132 ZZH RX MED GY IP 250 OP 250 PS 637: Mod: GY | Performed by: EMERGENCY MEDICINE

## 2020-06-07 PROCEDURE — 25500064 ZZH RX 255 OP 636: Performed by: EMERGENCY MEDICINE

## 2020-06-07 PROCEDURE — A9585 GADOBUTROL INJECTION: HCPCS | Performed by: EMERGENCY MEDICINE

## 2020-06-07 PROCEDURE — 84443 ASSAY THYROID STIM HORMONE: CPT | Performed by: EMERGENCY MEDICINE

## 2020-06-07 PROCEDURE — 70553 MRI BRAIN STEM W/O & W/DYE: CPT

## 2020-06-07 PROCEDURE — 70549 MR ANGIOGRAPH NECK W/O&W/DYE: CPT

## 2020-06-07 PROCEDURE — 99285 EMERGENCY DEPT VISIT HI MDM: CPT | Mod: 25

## 2020-06-07 PROCEDURE — 83735 ASSAY OF MAGNESIUM: CPT | Performed by: EMERGENCY MEDICINE

## 2020-06-07 PROCEDURE — 93005 ELECTROCARDIOGRAM TRACING: CPT

## 2020-06-07 PROCEDURE — 70544 MR ANGIOGRAPHY HEAD W/O DYE: CPT | Mod: XS

## 2020-06-07 PROCEDURE — 96361 HYDRATE IV INFUSION ADD-ON: CPT

## 2020-06-07 PROCEDURE — 96374 THER/PROPH/DIAG INJ IV PUSH: CPT | Mod: 59

## 2020-06-07 PROCEDURE — 80048 BASIC METABOLIC PNL TOTAL CA: CPT | Performed by: EMERGENCY MEDICINE

## 2020-06-07 PROCEDURE — 85025 COMPLETE CBC W/AUTO DIFF WBC: CPT | Performed by: EMERGENCY MEDICINE

## 2020-06-07 RX ORDER — MECLIZINE HYDROCHLORIDE 25 MG/1
25 TABLET ORAL ONCE
Status: COMPLETED | OUTPATIENT
Start: 2020-06-07 | End: 2020-06-07

## 2020-06-07 RX ORDER — ONDANSETRON 4 MG/1
4 TABLET, ORALLY DISINTEGRATING ORAL EVERY 6 HOURS PRN
Qty: 20 TABLET | Refills: 0 | Status: SHIPPED | OUTPATIENT
Start: 2020-06-07 | End: 2021-05-26

## 2020-06-07 RX ORDER — ONDANSETRON 2 MG/ML
4 INJECTION INTRAMUSCULAR; INTRAVENOUS ONCE
Status: COMPLETED | OUTPATIENT
Start: 2020-06-07 | End: 2020-06-07

## 2020-06-07 RX ORDER — MECLIZINE HYDROCHLORIDE 25 MG/1
25 TABLET ORAL EVERY 6 HOURS PRN
Qty: 30 TABLET | Refills: 1 | Status: SHIPPED | OUTPATIENT
Start: 2020-06-07 | End: 2020-08-13

## 2020-06-07 RX ORDER — GADOBUTROL 604.72 MG/ML
7.5 INJECTION INTRAVENOUS ONCE
Status: COMPLETED | OUTPATIENT
Start: 2020-06-07 | End: 2020-06-07

## 2020-06-07 RX ADMIN — SODIUM CHLORIDE 500 ML: 9 INJECTION, SOLUTION INTRAVENOUS at 15:52

## 2020-06-07 RX ADMIN — MECLIZINE HYDROCHLORIDE 25 MG: 25 TABLET ORAL at 20:04

## 2020-06-07 RX ADMIN — GADOBUTROL 7.5 ML: 604.72 INJECTION INTRAVENOUS at 17:29

## 2020-06-07 RX ADMIN — MECLIZINE HYDROCHLORIDE 25 MG: 25 TABLET ORAL at 15:54

## 2020-06-07 RX ADMIN — APIXABAN 5 MG: 5 TABLET, FILM COATED ORAL at 19:56

## 2020-06-07 RX ADMIN — ONDANSETRON 4 MG: 2 INJECTION INTRAMUSCULAR; INTRAVENOUS at 16:00

## 2020-06-07 ASSESSMENT — ENCOUNTER SYMPTOMS
DIARRHEA: 0
LIGHT-HEADEDNESS: 0
ANAL BLEEDING: 0
RHINORRHEA: 0
HEADACHES: 0
BLOOD IN STOOL: 0
ABDOMINAL PAIN: 0
DIFFICULTY URINATING: 0
FEVER: 0
SHORTNESS OF BREATH: 0
COUGH: 0
VOMITING: 1
WEAKNESS: 0
DIZZINESS: 1
NAUSEA: 1
DYSURIA: 0

## 2020-06-07 NOTE — ED TRIAGE NOTES
"Daughter, Faye states that the patient hands have been more shaky and she is having episodes of \"heavy breathing\"  "

## 2020-06-07 NOTE — ED TRIAGE NOTES
Awoke this am feeling dizzy and lightheaded.  Then became nauseated and started vomiting.  BP elevated at home.  Alert and oriented x3

## 2020-06-07 NOTE — ED PROVIDER NOTES
"  History     Chief Complaint:  Dizziness    HPI   Tanya Graham is a 89 year old female who presents for evaluation of dizziness. Around 0700 today, the patient reports feeling dizzy as soon as she woke up; she describes this sensation as vertiginous in nature. Shortly after the dizziness started, she reports becoming nauseated and was dry heaving, but nothing came up. She reports having \"dizzy spells\" years ago, but nothing recently and nothing like what she was experiencing today. Given this abnormal sensation, she told her daughter who called the clinic triage line who recommended the patient be evaluated in the ED. Here, the patient reports the dizziness improves when she lies still, but worsens any time she tries to move. She also reports continued nausea. She initially attributed her symptoms to dehydration as she is \"not good at drinking water,\" however she also reports feeling at her baseline yesterday. She denies any headache, visual disturbance, unilateral weakness, or other neurologic symptom. She also denies any chest pain, shortness of breath, fever, cough, or other respiratory issue, nor has she had any abdominal pain, stool change, or difficulty urinating recently. She has no previous history of atrial fibrillation or flutter, nor has she had a stroke.    Allergies:  IV Narcotics - Vomiting     Medications:    Baby aspirin   Atenolol  Lasix  Hydrochlorothiazide  Lisinopril   Niacinamide  Simvastatin     Past Medical History:    Basal cell carcinoma   Diverticulosis  Hypertension  Breast cancer  Hyperlipidemia  Gout   Ventral hernia  Postmastectomy lymphedema syndrome  Intestinal obstruction     Past Surgical History:    Left breast biopsy x2  Hysterectomy, bilateral salpingo-oophorectomy   Ventral herniorrhaphy x2  Left carotid aneurysm repair  Bilateral cataract removal   Bladder repair     Family History:    CAD  Cancer  Prostate Cancer  Diabetes  MI   Celiac disease  Fibromyalgia     Social " History:  Marital Status:   [5]  Lives in a house with her dog.   Negative for tobacco use.  Negative for alcohol use.  Negative for drug use.      Review of Systems   Constitutional: Negative for fever.   HENT: Negative for congestion and rhinorrhea.    Eyes: Negative for visual disturbance.   Respiratory: Negative for cough and shortness of breath.    Cardiovascular: Negative for chest pain.   Gastrointestinal: Positive for nausea and vomiting (dry heaving). Negative for abdominal pain, anal bleeding, blood in stool and diarrhea.   Genitourinary: Negative for difficulty urinating and dysuria.   Neurological: Positive for dizziness. Negative for weakness, light-headedness and headaches.   All other systems reviewed and are negative.    Physical Exam     Patient Vitals for the past 24 hrs:   BP Temp Temp src Pulse Heart Rate Resp SpO2   06/07/20 1930 (!) 142/79 -- -- 69 73 -- 97 %   06/07/20 1900 138/74 -- -- 60 73 -- 97 %   06/07/20 1840 -- -- -- -- 62 -- 97 %   06/07/20 1835 -- -- -- -- 68 -- 96 %   06/07/20 1830 (!) 142/80 -- -- 62 67 -- 97 %   06/07/20 1825 -- -- -- -- 66 -- 95 %   06/07/20 1820 -- -- -- -- 71 -- 97 %   06/07/20 1815 -- -- -- -- 67 -- 98 %   06/07/20 1810 (!) 159/80 -- -- 70 74 -- --   06/07/20 1700 (!) 149/82 -- -- 68 63 -- 100 %   06/07/20 1655 -- -- -- -- 65 -- 98 %   06/07/20 1650 -- -- -- -- 68 -- --   06/07/20 1645 (!) 145/94 -- -- 70 71 -- --   06/07/20 1640 -- -- -- -- 70 -- --   06/07/20 1635 -- -- -- -- 69 -- --   06/07/20 1630 (!) 177/82 -- -- 73 85 -- --   06/07/20 1625 -- -- -- -- 73 -- --   06/07/20 1620 -- -- -- -- 64 -- --   06/07/20 1615 (!) 157/87 -- -- 66 71 -- --   06/07/20 1610 -- -- -- -- 70 -- --   06/07/20 1605 -- -- -- -- 68 -- 98 %   06/07/20 1600 (!) 154/80 -- -- 67 70 -- 99 %   06/07/20 1555 -- -- -- -- 70 -- 98 %   06/07/20 1550 -- -- -- -- 64 -- 99 %   06/07/20 1545 (!) 152/105 -- -- 68 73 -- 96 %   06/07/20 1540 -- -- -- -- 68 -- 97 %   06/07/20 1535 -- --  -- -- 70 -- 96 %   06/07/20 1530 (!) 151/81 -- -- 65 71 -- 98 %   06/07/20 1525 -- -- -- -- 66 -- 98 %   06/07/20 1520 -- -- -- -- 70 -- 96 %   06/07/20 1515 (!) 150/100 -- -- 64 69 -- 97 %   06/07/20 1510 -- -- -- -- 71 -- 99 %   06/07/20 1505 -- -- -- -- 67 -- 98 %   06/07/20 1500 (!) 165/84 -- -- 69 69 -- 97 %   06/07/20 1455 -- -- -- -- 69 -- 100 %   06/07/20 1417 (!) 190/118 98  F (36.7  C) Oral -- 89 20 92 %      Physical Exam  Constitutional: Well developed, nontox appearance  Head: Atraumatic.   Mouth/Throat: Oropharynx is clear and moist.   Neck:  no stridor  Eyes: no scleral icterus, PERRL, EOMI, L sided nystagmus  Cardiovascular: irreg irreg rate and rhythm, 2+ bilat radial pulses  Pulmonary/Chest: nml resp effort, Clear BS bilat  Abdominal: ND, soft, NT, no rebound or guarding   Ext: Warm, well perfused, no edema  Neurological: A&O,  CNII-XII intact, nml finger to nose, 5/5 strength throughout upper and lower ext, symmetric; sensation grossly intact  Skin: Skin is warm and dry.   Psychiatric: Behavior is normal. Thought content normal.   Nursing note and vitals reviewed.    Emergency Department Course   ECG:  Indication: Dizziness  Time: 1428  Vent. Rate 78 bpm. IN interval *. QRS duration 86. QT/QTc 412/469. P-R-T axis * -34 5.    Atrial flutter with variable AV block with premature ventricular or aberrantly conducted complexes.  Left axis deviation .  Abnormal ECG. Read time: 1500.    Imaging:  Radiographic findings were communicated with the patient who voiced understanding of the findings.  MR Neck w/o and w/ contrast angiogram:   1.  Nonvisualization of left external carotid artery which is presumably occluded.   2.  No evidence for any common or internal carotid stenosis or dissection. Vertebral arteries are also normal as visualized. The proximal left vertebral artery is not well visualized on this study, as per radiology     MR Brain w/o and w/ contrast:   1.  Minimal nonspecific white matter  changes. These are probably due to chronic small vessel ischemic disease given the patient's age.   2.  No evidence for acute infarct, intracranial hemorrhage, or any focal mass lesions, as per radiology    MR Head w/o contrast angiogram:   Normal MRA [place], as per radiology    Laboratory:  CBC: WBC: 6.5, HGB: 14.3, PLT: 158  BMP: Glucose 123 (H), Ca: 8.3 (L), o/w WNL (Creatinine: 0.65)    1437 Troponin: <0.015    Magnesium: 1.8  TSH: 3.23    Interventions:  1552 0.9% NaCl, 500 mL, IV bolus  1554 Meclizine, 25 mg, PO   Zofran, 4 mg, IV injection    Eliquis, 5 mg, PO   Meclizine, 25 mg, PO    Emergency Department Course:  Nursing notes and vitals reviewed.   EKG obtained in the ED, see results above.      1539: I performed an exam of the patient as documented above.      IV was inserted and blood was drawn for laboratory testing, results above. Medicine administered as documented above.     The patient was sent for MR/MRA studies of the head and neck while in the emergency department, results above.      1903: I consulted with Dr. Beltran, stroke neurology, regarding the patient's history and presentation here in the emergency department.    1921: I rechecked the patient and discussed the results of her workup thus far.     1950: RN spoke with the patient's daughter regarding her work up in the ED and plan for discharge.     Findings and plan explained to the Patient. Patient discharged home with instructions regarding supportive care, medications, and reasons to return. The importance of close follow-up was reviewed. The patient was prescribed Eliquis, calcium carbonate, Zofran, and meclizine. She was also referred to vascular neurology and cardiology.     I personally reviewed the laboratory and imaging results with the Patient and answered all related questions prior to discharge.    Impression & Plan      Medical Decision Makin year old female presenting w/ vertigo      DDx includes  BPPV, central vertigo, electrolyte abnml, peripheral vertigo NOS, labyrinthitis.    Screening EKG demonstrated rate controlled atrial flutter which the patient has not been diagnosed with before per chart review and history.  Labs unremarkable.  The patient's symptomatology and exam seem more consistent with peripheral vertigo but given new atrial flutter, I think it would be reasonable to pursue an MRI to evaluate for possible central cause of vertigo such as CVA.  MRI returned without acute evidence of CVA or mass.  Vascular abnormalities as noted above.  I briefly discussed the patient with neuro stroke without any reasonable to have the patient follow-up with vascular neurology.  Given the patient's chads 2 Vasc score of 3, she was started on Eliquis in the emergency department after discussion of risks, benefits and different types of anticoagulation.  A cardiology referral was placed prior to discharge.  Patient symptoms improved with medications as noted above.  Doubt MRI negative CVA. Given reassuring work-up and HR wnl, at this time I feel the pt is safe for discharge.  Likely peripheral vertigo.  Recommendations given regarding follow up with PCP, cardiology and return to the emergency department as needed for new or worsening symptoms.  Pt counseled on all results, disposition and diagnosis.  They are understanding and agreeable to plan. Patient discharged in stable condition.      Diagnosis:    ICD-10-CM    1. Atrial flutter, unspecified type (H)  I48.92 Follow-Up with Cardiologist   2. Carotid atherosclerosis, unspecified laterality  I65.29 NEUROLOGY ADULT REFERRAL   3. Peripheral vertigo, unspecified laterality  H81.399    4. Hypocalcemia  E83.51        Disposition:  discharged to home    Discharge Medications:  New Prescriptions    APIXABAN ANTICOAGULANT (ELIQUIS ANTICOAGULANT) 5 MG TABLET    Take 1 tablet (5 mg) by mouth 2 times daily    CALCIUM CARBONATE (OS-JAQUAN) 1500 (600 CA) MG TABLET    Take 1  tablet (600 mg) by mouth 2 times daily (with meals) for 7 days    MECLIZINE (ANTIVERT) 25 MG TABLET    Take 1 tablet (25 mg) by mouth every 6 hours as needed for dizziness    ONDANSETRON (ZOFRAN ODT) 4 MG ODT TAB    Take 1 tablet (4 mg) by mouth every 6 hours as needed     Scribe Disclosure:  I, Gabbi Turner, am serving as a scribe on 6/7/2020 at 3:39 PM to personally document services performed by Leonardo Moreno MD based on my observations and the provider's statements to me.     6/7/2020   [facility] EMERGENCY DEPARTMENT       Leonardo Moreno MD  06/08/20 7437

## 2020-06-07 NOTE — ED AVS SNAPSHOT
[facility] Emergency Department  [address]  [facility] [address]  Phone:  [phone]  Fax:  [phone]                                    Tanya Graham   MRN: [id number]    Department:  [facility] Emergency Department   Date of Visit:  6/7/2020           After Visit Summary Signature Page    I have received my discharge instructions, and my questions have been answered. I have discussed any challenges I see with this plan with the nurse or doctor.    ..........................................................................................................................................  Patient/Patient Representative Signature      ..........................................................................................................................................  Patient Representative Print Name and Relationship to Patient    ..................................................               ................................................  Date                                   Time    ..........................................................................................................................................  Reviewed by Signature/Title    ...................................................              ..............................................  Date                                               Time          22EPIC Rev 08/18

## 2020-06-07 NOTE — TELEPHONE ENCOUNTER
"Faye daughter - [phone]      In to see mom today  - mom lives independently       CC:  \"dizzy\" since this am + nausea       Did have dizzy spells \"years ago\" after neck artery issues but nothing anytime recently - not a usual problem for her - not usually have these spells       Not sound confused  To her      No falls - no injuries       Daughter notes that her hands look \"very shaky\" (this is new)      F  Face not look droopy to her   A Able to raise both arms     S Speech seems \"slow\" but not garbled    /XX L are      /XX R arm      Is able to get up and move on her own - does use a cane though                A/P:   > Would direct to local ED for eval - take phone +  + mask   > If concern for safety getting into car, call for EMS for safe transport            Pineda Robison RN       COVID 19 Nurse Triage Plan/Patient Instructions    Please be aware that novel coronavirus (COVID-19) may be circulating in the community. If you develop symptoms such as fever, cough, or SOB or if you have concerns about the presence of another infection including coronavirus (COVID-19), please contact your health care provider or visit [email or web address].     Disposition/Instructions    Patient to go to ED and follow protocol based instructions. Follow System Ambulatory Workflow for COVID 19.     Bring Your Own Device:  Please also bring your smart device(s) (smart phones, tablets, laptops) and their charging cables for your personal use and to communicate with your care team during your visit.    Thank you for taking steps to prevent the spread of this virus.  o Limit your contact with others.  o Wear a simple mask to cover your cough.  o Wash your hands well and often.    Resources    [facility]: About COVID-19: [email or web address]    CDC: What to Do If You're Sick: www.cdc.gov/coronavirus/2019-ncov/about/steps-when-sick.html    CDC: Ending Home Isolation: " www.cdc.gov/coronavirus/2019-ncov/hcp/disposition-in-home-patients.html     CDC: Caring for Someone: www.cdc.gov/coronavirus/2019-ncov/if-you-are-sick/care-for-someone.html     [facility]: Interim Guidance for Hospital Discharge to Home: www.health.[facility].[place].us/diseases/coronavirus/hcp/hospdischarge.pdf    [facility] clinical trials (COVID-19 research studies): clinicalaffairs.[email or web address]/umn-clinical-trials     Below are the COVID-19 hotlines at the [place] Department of Health ([facility]). Interpreters are available.   o For health questions: Call 414-516-8009 or 1-157.302.6150 (7 a.m. to 7 p.m.)  o For questions about schools and childcare: Call 082-475-5341 or 1-888.298.9594 (7 a.m. to 7 p.m.)                         Reason for Disposition    [1] Dizziness (vertigo) present now AND [2] age > 60  (Exception: prior physician evaluation for this AND no different/worse than usual)    Additional Information    Negative: [1] Weakness (i.e., paralysis, loss of muscle strength) of the face, arm or leg on one side of the body AND [2] sudden onset AND [3] present now    Negative: [1] Numbness (i.e., loss of sensation) of the face, arm or leg on one side of the body AND [2] sudden onset AND [3] present now    Negative: [1] Loss of speech or garbled speech AND [2] sudden onset AND [3] present now    Negative: Difficult to awaken or acting confused (e.g., disoriented, slurred speech)    Negative: Sounds like a life-threatening emergency to the triager    Negative: Followed a head injury    Negative: Followed an ear injury    Negative: Localized weakness or numbness is main symptom    Negative: Dizziness relates to riding in a car, going to an amusement park, etc.    Negative: [1] Dizziness is main symptom AND [2] NO spinning sensation (i.e., vertigo)    Negative: SEVERE dizziness (vertigo) (e.g., unable to walk without assistance)    Negative: [1] Dizziness (vertigo) present now AND [2] one or more stroke risk factors (i.e.,  hypertension, diabetes, prior stroke/TIA/heart attack)  (Exception: prior physician evaluation for this AND no different/worse than usual)    Protocols used: DIZZINESS - VERTIGO-A-AH

## 2020-06-08 ENCOUNTER — TELEPHONE (OUTPATIENT)
Dept: FAMILY MEDICINE | Facility: CLINIC | Age: 85
End: 2020-06-08

## 2020-06-08 DIAGNOSIS — I48.92 ATRIAL FLUTTER, UNSPECIFIED TYPE (H): Primary | ICD-10-CM

## 2020-06-08 NOTE — TELEPHONE ENCOUNTER
Faye calling back to check status.  Please advise as RN cannot change medication and no response from PCP yet    Charlotte Welch RN, BSN

## 2020-06-08 NOTE — TELEPHONE ENCOUNTER
Patient's daughter Faye calling, don't see consent to communicate on file for her. Pt was in ED yesterday 6/7/20 and prescribed apixaban which the cost is $400. She was instructed by pt pharmacy to reach out to pt pcp to see if there is a cheaper alternative. Please advise and return call at    PH:[phone]        Emily Rodriguez-Patient Rep

## 2020-06-08 NOTE — DISCHARGE INSTRUCTIONS
Please follow-up with cardiology.    Please return to the emergency department as needed for new or worsening symptoms including fainting, head injury, black or bloody bowel movements, vomiting blood, focal weakness, uncontrollable dizziness or vertigo, shortness of breath, any other concerning symptoms.

## 2020-06-09 ENCOUNTER — DOCUMENTATION ONLY (OUTPATIENT)
Dept: FAMILY MEDICINE | Facility: CLINIC | Age: 85
End: 2020-06-09

## 2020-06-09 DIAGNOSIS — I48.92 ATRIAL FLUTTER, UNSPECIFIED TYPE (H): Primary | ICD-10-CM

## 2020-06-09 DIAGNOSIS — Z79.01 LONG TERM CURRENT USE OF ANTICOAGULANT THERAPY: ICD-10-CM

## 2020-06-09 LAB — INTERPRETATION ECG - MUSE: NORMAL

## 2020-06-09 RX ORDER — WARFARIN SODIUM 5 MG/1
5 TABLET ORAL DAILY
Qty: 30 TABLET | Refills: 0 | Status: SHIPPED | OUTPATIENT
Start: 2020-06-09 | End: 2020-06-15

## 2020-06-09 NOTE — TELEPHONE ENCOUNTER
The cheapest option would be coumadin. I am not sure what else is on her formulary.   Will order 5mg and place referral to coagulation clinic  Also, please stop ibu,

## 2020-06-09 NOTE — PROGRESS NOTES
Dr. Bird,    There is some additional information needed on this patient's INR referral.  Please review, complete, and sign.    Thank you,    [facility]ticoagulation [facility]

## 2020-06-09 NOTE — TELEPHONE ENCOUNTER
Called patient. Daughter Faye answered. She is with patient at her house.  Set up lab appointment at [facility] for first POCT INR. Daughter drives her to appointments.  Patient will take first dose of warfarin today.     Guide to Warfarin Therapy mailed to home address.    Caron Wayne RN

## 2020-06-09 NOTE — TELEPHONE ENCOUNTER
Pt's daughter notified will  and start warfarin today.     Daughter would like to be called to do conference call for first anticoag visit with mom.   Please call [phone]    They will do labs at [facility] to start but would like at some point to do home INR checks if possible.     Mikie persaud coag notified and they will f/u with daughter     Berenice Fisher RN

## 2020-06-10 ENCOUNTER — DOCUMENTATION ONLY (OUTPATIENT)
Dept: LAB | Facility: CLINIC | Age: 85
End: 2020-06-10

## 2020-06-10 DIAGNOSIS — I48.92 ATRIAL FLUTTER, CHRONIC (H): Primary | ICD-10-CM

## 2020-06-10 NOTE — PROGRESS NOTES
Patient is coming in for a lab only appointment on 06/12/20 and the appointment notes state she needs an INR and she is a new INR patient. She currently does not have an INR order in her chart. Please place orders for this patient. Thank You.

## 2020-06-12 ENCOUNTER — ANTICOAGULATION THERAPY VISIT (OUTPATIENT)
Dept: NURSING | Facility: CLINIC | Age: 85
End: 2020-06-12
Payer: MEDICARE

## 2020-06-12 DIAGNOSIS — I48.92 ATRIAL FLUTTER, CHRONIC (H): ICD-10-CM

## 2020-06-12 DIAGNOSIS — Z79.01 LONG TERM CURRENT USE OF ANTICOAGULANT THERAPY: Primary | ICD-10-CM

## 2020-06-12 DIAGNOSIS — I48.92 ATRIAL FLUTTER, UNSPECIFIED TYPE (H): ICD-10-CM

## 2020-06-12 LAB — INR PPP: 1.9 (ref 0.86–1.14)

## 2020-06-12 PROCEDURE — 85610 PROTHROMBIN TIME: CPT | Performed by: FAMILY MEDICINE

## 2020-06-12 PROCEDURE — 99207 ZZC NO CHARGE NURSE ONLY: CPT

## 2020-06-12 PROCEDURE — 36415 COLL VENOUS BLD VENIPUNCTURE: CPT | Performed by: FAMILY MEDICINE

## 2020-06-12 NOTE — PROGRESS NOTES
ANTICOAGULATION INITIAL CLINIC VISIT    Patient Name:  Tanya Graham  Date:  6/12/2020  Referred by: ED/Miriam Bird  Contact Type:  Telephone/ with patient and daughter, Marcela    SUBJECTIVE:  Coumadin education was completed today.  Topics covered include:  -Introduction to coumadin  -Proper Administration  -INR Testing  -Sign/Symptoms of Bleeding  -Signs/Symptoms of Clot Formation or Stroke  -Dietary Intake of Vitamin K  -Drug Interactions  -Anticoagulation Identification (bracelet, necklace or wallet card)  -Future Surgery  -Effects of Alcohol, Tobacco, and Exercise on Coumadin    Coumadin Education Booklet and Coumadin Identification Wallet Card were given to the patient.    Patient Findings     Comments:   Patient to ED 6/7/20 with symptomatic a-flutter, started on Eliquis. However, this would be $400 out of pocket for the patient so she started warfarin 6/9/20. Today INR is 1.9. Per protocol, will reduced warfarin to 2.5 mg daily and recheck on Monday. Since she wasn't started on warfarin in the ED, they did not receive any education. Reviewed The Guide to Warfarin Therapy with the patient and he daughter, Marcela. Her daughters are staying with her at this time because she is still having symptoms of vertigo. They are unsure if this symptoms is related to the a-flutter or if it is an inner ear issue. They have an appointment with Cardiology next week but cannot get in with PCP until August. They stated understanding of instructions today. Mailing out Foods with Vitamin K list as well as a warfarin therapy calendar,. Patient eats a few salads a week, maybe some other green veggies like green beans, but mostly just iceberg lettuce. Advised to write down the green veggies over this next week or two to get an idea of what she eats and how often. Patient does not use alcohol or tobacco. Stated understanding that if anything changes with diet, medications, supplements, OTC, vitamins, activity, alcohol use to let  INR nurse know.   Radha ROBLES RN  Anticoagulation Clinic  [place]          Clinical Outcomes     Negatives:   Major bleeding event, Thromboembolic event, Anticoagulation-related hospital admission, Anticoagulation-related ED visit, Anticoagulation-related fatality    Comments:   Patient to ED 20 with symptomatic a-flutter, started on Eliquis. However, this would be $400 out of pocket for the patient so she started warfarin 20. Today INR is 1.9. Per protocol, will reduced warfarin to 2.5 mg daily and recheck on Monday. Since she wasn't started on warfarin in the ED, they did not receive any education. Reviewed The Guide to Warfarin Therapy with the patient and he daughter, Marecla. Her daughters are staying with her at this time because she is still having symptoms of vertigo. They are unsure if this symptoms is related to the a-flutter or if it is an inner ear issue. They have an appointment with Cardiology next week but cannot get in with PCP until August. They stated understanding of instructions today. Mailing out Foods with Vitamin K list as well as a warfarin therapy calendar,. Patient eats a few salads a week, maybe some other green veggies like green beans, but mostly just iceberg lettuce. Advised to write down the green veggies over this next week or two to get an idea of what she eats and how often. Patient does not use alcohol or tobacco. Stated understanding that if anything changes with diet, medications, supplements, OTC, vitamins, activity, alcohol use to let INR nurse know.   Radha ROBLES RN  Anticoagulation Clinic  [place]            OBJECTIVE    Recent labs: (last 7 days)     20  1349   INR 1.90*       ASSESSMENT / PLAN  INR assessment SUB    Recheck INR In: 3 DAYS    INR Location Clinic      Anticoagulation Summary  As of 2020    INR goal:   2.0-3.0   TTR:   --   INR used for dosin.90! (2020)   Warfarin maintenance plan:   2.5 mg (5 mg x 0.5) every day   Full warfarin  instructions:   2.5 mg every day   Weekly warfarin total:   17.5 mg   Plan last modified:   Radha Trejo RN (6/12/2020)   Next INR check:   6/15/2020   Priority:   High   Target end date:   6/9/2021    Indications    Atrial flutter  unspecified type (H) [I48.92]  Long term current use of anticoagulant therapy [Z79.01]             Anticoagulation Episode Summary     INR check location:       Preferred lab:       Send INR reminders to:   [facility]    Comments:         Anticoagulation Care Providers     Provider Role Specialty Phone number    Miriam Bird MD Referring [facility] [phone]            See the Encounter Report to view Anticoagulation Flowsheet and Dosing Calendar (Go to Encounters tab in chart review, and find the Anticoagulation Therapy Visit)    Dosage adjustment made based on physician directed care plan.    Radha Trejo RN

## 2020-06-15 ENCOUNTER — TELEPHONE (OUTPATIENT)
Dept: FAMILY MEDICINE | Facility: CLINIC | Age: 85
End: 2020-06-15

## 2020-06-15 ENCOUNTER — ANTICOAGULATION THERAPY VISIT (OUTPATIENT)
Dept: NURSING | Facility: CLINIC | Age: 85
End: 2020-06-15

## 2020-06-15 DIAGNOSIS — Z79.01 LONG TERM CURRENT USE OF ANTICOAGULANT THERAPY: ICD-10-CM

## 2020-06-15 DIAGNOSIS — Z79.01 LONG TERM CURRENT USE OF ANTICOAGULANTS WITH INR GOAL OF 2.0-3.0: Primary | ICD-10-CM

## 2020-06-15 DIAGNOSIS — I48.92 ATRIAL FLUTTER, UNSPECIFIED TYPE (H): ICD-10-CM

## 2020-06-15 DIAGNOSIS — R42 VERTIGO: Primary | ICD-10-CM

## 2020-06-15 LAB — INR PPP: 2.1 (ref 0.86–1.14)

## 2020-06-15 PROCEDURE — 85610 PROTHROMBIN TIME: CPT | Performed by: FAMILY MEDICINE

## 2020-06-15 PROCEDURE — 36416 COLLJ CAPILLARY BLOOD SPEC: CPT | Performed by: FAMILY MEDICINE

## 2020-06-15 PROCEDURE — 99207 ZZC NO CHARGE NURSE ONLY: CPT

## 2020-06-15 RX ORDER — WARFARIN SODIUM 5 MG/1
TABLET ORAL
Qty: 30 TABLET | Refills: 0
Start: 2020-06-15 | End: 2020-06-25 | Stop reason: ALTCHOICE

## 2020-06-15 NOTE — PROGRESS NOTES
Anticoagulation Management    Unable to reach Elma today.    Today's INR result of 2.1 is therapeutic (goal INR of 2.0-3.0).  Result received from: Clinic Lab    Follow up required to confirm warfarin dose taken and assess for changes.  Patient is a new start on warfarin.    Left VM to call Daisy with transfer to Rhianna at: [phone]      Anticoagulation clinic to follow up    Rhianna Bro RN    ANTICOAGULATION FOLLOW-UP CLINIC VISIT    Patient Name:  Tanya Graham  Date:  6/15/2020  Contact Type:  Telephone/ with patient and daughter, Faye Mckinney would like to be called with each INR result, her # put in demographics.    SUBJECTIVE:  Patient Findings     Comments:   Patient will see her cardiologist at [facility] on 20.  All medications reviewed, med list up-dated.        Clinical Outcomes     Negatives:   Major bleeding event, Thromboembolic event, Anticoagulation-related hospital admission, Anticoagulation-related ED visit, Anticoagulation-related fatality    Comments:   Patient will see her cardiologist at [facility] on 20.  All medications reviewed, med list up-dated.           OBJECTIVE    Recent labs: (last 7 days)     06/15/20  1058   INR 2.10*       ASSESSMENT / PLAN  INR assessment THER    Recheck INR In: 3 DAYS    INR Location Clinic      Anticoagulation Summary  As of 6/15/2020    INR goal:   2.0-3.0   TTR:   --   INR used for dosin.10 (6/15/2020)   Warfarin maintenance plan:   2.5 mg (5 mg x 0.5) every day   Full warfarin instructions:   2.5 mg every day   Weekly warfarin total:   17.5 mg   Plan last modified:   Rhianna Bro RN (6/15/2020)   Next INR check:   2020   Priority:   High   Target end date:   2021    Indications    Atrial flutter  unspecified type (H) [I48.92]  Long term current use of anticoagulant therapy [Z79.01]             Anticoagulation Episode Summary     INR check location:       Preferred lab:       Send INR reminders to:   [facility]     Comments:         Anticoagulation Care Providers     Provider Role Specialty Phone number    Miriam Bird MD Referring [facility] [phone]            See the Encounter Report to view Anticoagulation Flowsheet and Dosing Calendar (Go to Encounters tab in chart review, and find the Anticoagulation Therapy Visit)        Rhianna Bro RN

## 2020-06-15 NOTE — TELEPHONE ENCOUNTER
Talked with daughter today.   There are a number of issues.   She was seen in ER with vertigo and is still having symptoms.   daughter was told she could not be along until sx were gone.   She was supposed to see neurology but has not been contacted for the appt. The referral was placed 6/7/2020.    So...  1. We need someone to help schedule neurology appt.      In the meantime her daughter is worried about her safefy and the need to really have someone there all the time.   2.  Perhaps assessment by OT would help?   For sure I would like social work or a PAL to contact to help assess the needs of this patient    Also daughter Faye brought over paperwork today for [place] as patient is on coumadin for a fib because eloquis not covered but it is problem bringing her in every few days.       3. Please tract down paperwork for Dr. Bird on PA for eloqius    Finally if the neurologist rules out neuro cause of vertigo, she would benefit from referral to balance center if sx are continuing.   Can check with SD to see if she would like F2F with this patient in next week or two

## 2020-06-15 NOTE — TELEPHONE ENCOUNTER
Please advise, should patient be seen with you when your in next, or schedule with Dr العلي this week     Gisell Tinoco/

## 2020-06-15 NOTE — TELEPHONE ENCOUNTER
General Call:   Who is calling:  Patient's daughter, Faye (no consent on file)  Reason for Call:  In clinic appointment  What are your questions or concerns:  Daughter calling stating that patient was seen in the ED last week for vertigo, high bp and a heart condition. Daughter stated that they told her that she needs to follow up with provider and patient cannot be alone until she is evaluated. Daughter stated that patient refuses to do a virtual visit and daughter would also like patient to be seen in clinic (doesn't have to be with Dr. Bird). Spoke to triage and triage said a virtual visit would be appropriate. Daughter was not happy with this and is insistent that patient needs to be seen in clinic, hoping it to be as soon as possible.  Date of last appointment with provider: 11/7/19  Okay to leave a detailed message:Yes at Other phone number:  [phone]    Bridget Carey,

## 2020-06-16 ENCOUNTER — TELEPHONE (OUTPATIENT)
Dept: CARE COORDINATION | Facility: CLINIC | Age: 85
End: 2020-06-16

## 2020-06-16 ENCOUNTER — PATIENT OUTREACH (OUTPATIENT)
Dept: CARE COORDINATION | Facility: CLINIC | Age: 85
End: 2020-06-16

## 2020-06-16 NOTE — PROGRESS NOTES
Clinic Care Coordination Contact  [facility]/Voicemail    Referral Source: PCP  Clinical Data: Care Coordinator Outreach  Outreach attempted x 1.  Left message on patient's voicemail with call back information and requested return call.  Plan: Care Coordinator will send care coordination introduction letter with care coordinator contact information and explanation of care coordination services via mail. Care Coordinator will try to reach patient again in 1-2 business days.    JULIANA Blancas   Care Coordination Team  [phone]

## 2020-06-16 NOTE — TELEPHONE ENCOUNTER
I have placed referrals to neurology, PT and care coordination. Please help set up neurology and PT.  If she is home bound, she could get her INR checked at home?

## 2020-06-16 NOTE — PROGRESS NOTES
Pre-Visit Planning     Future Appointments   Date Time Provider Department Center   6/17/2020 10:25 AM Gladis Martinez MD [facility] CR   6/18/2020  9:00 AM CR LAB [facility] CR   8/5/2020  9:15 AM Miriam Bird MD [facility] CR     Arrival Time for this Appointment: 10:05 AM   Appointment Notes for this encounter:   ER follow up    Questionnaires Reviewed/Assigned  Additional questionnaires assigned        Patient preferred phone number: [phone]    Patient contact not needed. Appointment made earlier today.  Key Vergara, ELLIEN, RN, PHN

## 2020-06-16 NOTE — LETTER
[place] CARE COORDINATION  [facility]   June 17, 2020    Tanya Graham  [address]  [place] [address]      Dear Tanya,    I am a clinic care coordinator who works with Miriam Bird MD at [facility] . I wanted to thank you for spending the time to talk with me.  Below is a description of clinic care coordination and how I can further assist you.      The clinic care coordination team is made up of a registered nurse,  and community health worker who understand the health care system. The goal of clinic care coordination is to help you manage your health and improve access to the health care system in the most efficient manner. The team can assist you in meeting your health care goals by providing education, coordinating services, strengthening the communication among your providers and supporting you with any resource needs.    Please feel free to contact me at [phone] with any questions or concerns. We are focused on providing you with the highest-quality healthcare experience possible and that all starts with you.     Sincerely,     JULIANA Blancas   Care Coordination Team  [phone]

## 2020-06-17 ENCOUNTER — OFFICE VISIT (OUTPATIENT)
Dept: FAMILY MEDICINE | Facility: CLINIC | Age: 85
End: 2020-06-17
Payer: MEDICARE

## 2020-06-17 VITALS
DIASTOLIC BLOOD PRESSURE: 71 MMHG | HEART RATE: 76 BPM | TEMPERATURE: 98.1 F | OXYGEN SATURATION: 93 % | WEIGHT: 173 LBS | BODY MASS INDEX: 30.65 KG/M2 | SYSTOLIC BLOOD PRESSURE: 156 MMHG

## 2020-06-17 DIAGNOSIS — I48.92 ATRIAL FLUTTER, UNSPECIFIED TYPE (H): Primary | ICD-10-CM

## 2020-06-17 DIAGNOSIS — Z09 FOLLOW-UP EXAM: ICD-10-CM

## 2020-06-17 DIAGNOSIS — R42 VERTIGO: ICD-10-CM

## 2020-06-17 PROBLEM — Z85.828 HISTORY OF BASAL CELL CARCINOMA: Status: ACTIVE | Noted: 2018-10-10

## 2020-06-17 PROCEDURE — 99214 OFFICE O/P EST MOD 30 MIN: CPT | Performed by: FAMILY MEDICINE

## 2020-06-17 SDOH — SOCIAL STABILITY: SOCIAL INSECURITY
WITHIN THE LAST YEAR, HAVE TO BEEN RAPED OR FORCED TO HAVE ANY KIND OF SEXUAL ACTIVITY BY YOUR PARTNER OR EX-PARTNER?: NO

## 2020-06-17 SDOH — SOCIAL STABILITY: SOCIAL INSECURITY: WITHIN THE LAST YEAR, HAVE YOU BEEN HUMILIATED OR EMOTIONALLY ABUSED IN OTHER WAYS BY YOUR PARTNER OR EX-PARTNER?: NO

## 2020-06-17 SDOH — ECONOMIC STABILITY: TRANSPORTATION INSECURITY
IN THE PAST 12 MONTHS, HAS THE LACK OF TRANSPORTATION KEPT YOU FROM MEDICAL APPOINTMENTS OR FROM GETTING MEDICATIONS?: NO

## 2020-06-17 SDOH — SOCIAL STABILITY: SOCIAL NETWORK
IN A TYPICAL WEEK, HOW MANY TIMES DO YOU TALK ON THE PHONE WITH FAMILY, FRIENDS, OR NEIGHBORS?: MORE THAN THREE TIMES A WEEK

## 2020-06-17 SDOH — SOCIAL STABILITY: SOCIAL INSECURITY: WITHIN THE LAST YEAR, HAVE YOU BEEN AFRAID OF YOUR PARTNER OR EX-PARTNER?: NO

## 2020-06-17 SDOH — HEALTH STABILITY: MENTAL HEALTH
STRESS IS WHEN SOMEONE FEELS TENSE, NERVOUS, ANXIOUS, OR CAN'T SLEEP AT NIGHT BECAUSE THEIR MIND IS TROUBLED. HOW STRESSED ARE YOU?: NOT AT ALL

## 2020-06-17 SDOH — ECONOMIC STABILITY: INCOME INSECURITY: HOW HARD IS IT FOR YOU TO PAY FOR THE VERY BASICS LIKE FOOD, HOUSING, MEDICAL CARE, AND HEATING?: NOT HARD AT ALL

## 2020-06-17 SDOH — SOCIAL STABILITY: SOCIAL NETWORK: HOW OFTEN DO YOU ATTEND CHURCH OR RELIGIOUS SERVICES?: NEVER

## 2020-06-17 SDOH — ECONOMIC STABILITY: FOOD INSECURITY: WITHIN THE PAST 12 MONTHS, YOU WORRIED THAT YOUR FOOD WOULD RUN OUT BEFORE YOU GOT MONEY TO BUY MORE.: NEVER TRUE

## 2020-06-17 SDOH — ECONOMIC STABILITY: TRANSPORTATION INSECURITY
IN THE PAST 12 MONTHS, HAS LACK OF TRANSPORTATION KEPT YOU FROM MEETINGS, WORK, OR FROM GETTING THINGS NEEDED FOR DAILY LIVING?: NO

## 2020-06-17 SDOH — ECONOMIC STABILITY: FOOD INSECURITY: WITHIN THE PAST 12 MONTHS, THE FOOD YOU BOUGHT JUST DIDN'T LAST AND YOU DIDN'T HAVE MONEY TO GET MORE.: NEVER TRUE

## 2020-06-17 SDOH — SOCIAL STABILITY: SOCIAL INSECURITY
WITHIN THE LAST YEAR, HAVE YOU BEEN KICKED, HIT, SLAPPED, OR OTHERWISE PHYSICALLY HURT BY YOUR PARTNER OR EX-PARTNER?: NO

## 2020-06-17 ASSESSMENT — ACTIVITIES OF DAILY LIVING (ADL): DEPENDENT_IADLS:: INDEPENDENT

## 2020-06-17 NOTE — PROGRESS NOTES
Subjective     Tanya Graham is a 89 year old female who presents to clinic today for the following health issues:    History of Present Illness        She eats 0-1 servings of fruits and vegetables daily.She consumes 0 sweetened beverage(s) daily.She exercises with enough effort to increase her heart rate 9 or less minutes per day.  She exercises with enough effort to increase her heart rate 3 or less days per week.   She is taking medications regularly.     ED/UC Followup:    Facility:  [facility]   Date of visit: 6/7/2020  Reason for visit: Dizziness  Current Status: Good       Was evaluated in the emergency department on 6-7-20 with complaints of dizziness and vertigo.  Her blood pressures were elevated in the emergency department, and her EKG indicated atrial flutter.  In addition to atrial flutter, patient was also felt to be experiencing true peripheral vertigo.  MRI of the brain returned within normal limits.  Was started on Eliquis, was already taking atenolol.  Eliquis turned out to be too expensive, therefore warfarin was started and patient was referred to INR clinic.  We are working on a prior authorization for Eliquis for the patient.     Patient was accompanied to the clinic today by her daughter.  She reports that she is feeling ok today.  Has cardiology appointment tomorrow.  Had episodes of head turning which caused vertigo, but otherwise has not had any issues.  Her blood pressures were elevated in the emergency department: Bp 139/67, 107/59, 137/70, today 156/71.  Aside from status on the PA for Eliquis and a referral to vestibular physical therapy, patient and daughter had no questions or concerns.      Current Outpatient Medications   Medication Sig Dispense Refill     apixaban ANTICOAGULANT (ELIQUIS STARTER PACK) 5 MG tablet Take 2 tablets (10 mg) by mouth 2 times daily for 7 days, THEN 1 tablet (5 mg) 2 times daily for 23 days. 74 tablet 0     atenolol (TENORMIN) 50 MG tablet TAKE 1 TABLET (50  MG) BY MOUTH DAILY 90 tablet 1     Calcium-Vitamin D-Vitamin K 500-100-40 MG-UNT-MCG CHEW        cholecalciferol (VITAMIN-D) 1000 UNIT capsule Take 1 capsule by mouth daily. Patient takes (2) capsules daily.       furosemide (LASIX) 20 MG tablet TAKE 1 TABLET (20 MG) BY MOUTH DAILY AS NEEDED (ANKLE SWELLING) 90 tablet 1     hydrochlorothiazide (HYDRODIURIL) 12.5 MG tablet Take 1 tablet (12.5 mg) by mouth daily 90 tablet 1     lisinopril (ZESTRIL) 10 MG tablet TAKE 1 TABLET BY MOUTH EVERY DAY 90 tablet 1     meclizine (ANTIVERT) 25 MG tablet Take 1 tablet (25 mg) by mouth every 6 hours as needed for dizziness 30 tablet 1     Multiple Vitamins-Minerals (OCUVITE-LUTEIN PO) Take  by mouth. Patient takes 2 tabs daily.       Niacinamide 500 MG TBCR Take 1 tablet by mouth daily To prevent skin cancer, per dermatologist 90 tablet 3     ondansetron (ZOFRAN ODT) 4 MG ODT tab Take 1 tablet (4 mg) by mouth every 6 hours as needed 20 tablet 0     simvastatin (ZOCOR) 10 MG tablet Take 1 tablet (10 mg) by mouth At Bedtime 90 tablet 3     warfarin ANTICOAGULANT (COUMADIN) 5 MG tablet Take 2.5mg everyday OR as instructed by INR clinic 30 tablet 0     Allergies   Allergen Reactions     No Clinical Screening - See Comments      Vomits from IV narcotics     BP Readings from Last 3 Encounters:   06/17/20 (!) 156/71   06/07/20 (!) 141/82   11/07/19 120/74    Wt Readings from Last 3 Encounters:   06/17/20 78.5 kg (173 lb)   11/07/19 82.1 kg (181 lb)   05/07/19 82.2 kg (181 lb 3.2 oz)                    Reviewed and updated as needed this visit by Provider  Meds         Review of Systems   Constitutional, HEENT, cardiovascular, pulmonary, gi and gu systems are negative, except as otherwise noted.      Objective    BP (!) 156/71   Pulse 76   Temp 98.1  F (36.7  C) (Oral)   Wt 78.5 kg (173 lb)   LMP 06/02/1981   SpO2 93%   BMI 30.65 kg/m    Body mass index is 30.65 kg/m .  Physical Exam   GENERAL: healthy, alert and no distress  RESP:  lungs clear to auscultation - no rales, rhonchi or wheezes  CV: regular rate and rhythm, normal S1 S2, no S3 or S4, no murmur, click or rub, no peripheral edema and peripheral pulses strong  MS: no gross musculoskeletal defects noted, no edema  PSYCH: mentation appears normal, affect normal/bright    Diagnostic Test Results:  Labs reviewed in Epic        Assessment & Plan     1. Follow-up exam  Is stable, doing well since discharge from the emergency department.  Discharge medication reconciliation completed.    2. Atrial flutter, unspecified type (H)  Patient stop regarding the prior authorization should have been sent to the clinic on Monday, however I was unable to find it today.  She will call to see if another fax can be provided.  In the meantime I will send in a new prescription for Eliquis, for initiation of prior authorization.  Follow up with cardiology as scheduled, follow up with PCP as needed after.  - apixaban ANTICOAGULANT (ELIQUIS STARTER PACK) 5 MG tablet; Take 2 tablets (10 mg) by mouth 2 times daily for 7 days, THEN 1 tablet (5 mg) 2 times daily for 23 days.  Dispense: 74 tablet; Refill: 0    3. Vertigo  Referral sent for vestibular physical therapy.  Follow-up as needed.  - PHYSICAL THERAPY REFERRAL; Future       There are no Patient Instructions on file for this visit.    Return if symptoms worsen or fail to improve.    Gladis Medel MD  [facility]

## 2020-06-17 NOTE — PROGRESS NOTES
Clinic Care Coordination Contact    Clinic Care Coordination Contact  OUTREACH    Referral Information:  Referral Source: PCP    Primary Diagnosis: Neurological Disorders    Chief Complaint   Patient presents with     Clinic Care Coordination - Initial     Coordinating Care, In Home Support        Universal Utilization: 48 % Admission or ED Risk.  Clinic Utilization  Difficulty keeping appointments:: No  Compliance Concerns: No  No-Show Concerns: No  No PCP office visit in Past Year: No  Utilization    Last refreshed: 6/17/2020  3:18 PM:  Hospital Admissions 0           Last refreshed: 6/17/2020  3:18 PM:  ED Visits 1           Last refreshed: 6/17/2020  3:18 PM:  No Show Count (past year) 0              Current as of: 6/17/2020  3:18 PM              Clinical Concerns:  Current Medical Concerns:  Presented in ED on 6/7/20 for evaluation of dizziness. Reported the dizziness improves when she lies still, but worsens any time she tries to move. She also reports continued nausea . Screening EKG demonstrated rate controlled atrial flutter which the patient has not been diagnosed with before per chart review and history.        Discharge Diagnosis:     ICD-10-CM     1. Atrial flutter, unspecified type (H)  I48.92 Follow-Up with Cardiologist   2. Carotid atherosclerosis, unspecified laterality  I65.29 NEUROLOGY ADULT REFERRAL   3. Peripheral vertigo, unspecified laterality  H81.399     4. Hypocalcemia  E83.51        Past Medical History:    Basal cell carcinoma   Diverticulosis  Hypertension  Breast cancer  Hyperlipidemia  Gout   Ventral hernia  Postmastectomy lymphedema syndrome  Intestinal obstruction     Current Behavioral Concerns: NA    Education Provided to patient: Care Coordination   Pain  Pain (GOAL):: No  Health Maintenance Reviewed:    Clinical Pathway: None    Medication Management:  Post Discharge Medication Reconciliation Status: discharge medications reconciled, continue medications without change.        Functional Status:  Dependent ADLs:: Independent  Dependent IADLs:: Independent  Bed or wheelchair confined:: No  Mobility Status: Independent  Fallen 2 or more times in the past year?: No  Any fall with injury in the past year?: No    Living Situation:  Current living arrangement:: I live alone  Type of residence:: Private home - stairs    Lifestyle & Psychosocial Needs:  Elma lives alone in her own home with her [place] dog Hansa.  She has stairs going in to the basement that she rarely needs to use.  She does have a cane that she uses as needed.  She describes herself as very independent and able to meet all of her own daily care needs.  She enjoys gardening and has a supportive [place] of friends.  Her daughter is currently staying with  her and she scheduled her Neurology appointment for her.  She has Cardiology appointment scheduled for tomorrow and she begins Physical Therapy on 7/1/20. She states that her daughter lives in the area and can help her if needed.       Lifestyle     Physical activity     Days per week: Not on file     Minutes per session: Not on file     Stress: Not at all     Social Needs     Financial resource strain: Not hard at all     Food insecurity     Worry: Never true     Inability: Never true     Transportation needs     Medical: No     Non-medical: No     Inadequate nutrition (GOAL):: No  Tube Feeding: No  Inadequate activity/exercise (GOAL):: No  Significant changes in sleep pattern (GOAL): No  Transportation means:: Accessible car, Family     Methodist or spiritual beliefs that impact treatment:: No  Mental health DX:: No  Mental health management concern (GOAL):: No  Informal Support system:: Friends   Socioeconomic History     Marital status:      Spouse name: Hollis     Number of children: 2     Years of education: Not on file     Highest education level: Not on file   Occupational History     Employer: RETIRED   Relationships     Social connections     Talks on phone:  More than three times a week     Gets together: Not on file     Attends Scientologist service: Never     Active member of club or organization: Not on file     Attends meetings of clubs or organizations: Not on file     Relationship status: Not on file     Intimate partner violence     Fear of current or ex partner: No     Emotionally abused: No     Physically abused: No     Forced sexual activity: No     Tobacco Use     Smoking status: Never Smoker     Smokeless tobacco: Never Used   Substance and Sexual Activity     Alcohol use: No     Drug use: No     Sexual activity: Yes     Partners: Male     Comment: postmenopausal               Resources and Interventions:  Current Resources: NA     Community Resources: None  Supplies used at home:: None  Equipment Currently Used at Home: cane, straight         Referrals Placed: None     Goals: NA      Patient/Caregiver understanding: Elma expressed understanding of what was discussed and AIDET communication was used during the encounter.       Future Appointments              Tomorrow CR LAB [facility]    In 2 weeks Bridget Fernandez, PT [facility], [facility]    In 1 month Miriam Bird MD [facility]          Plan: Elma plans to attend her upcoming appointments including Cardiology, Physical Therapy and Neurology.  She declines Care Coordination at this time but will contact  CC if she needs assistance in the future. No further outreaches will be made at this time.     JULIANA Blancas   Care Coordination Team  [phone]

## 2020-06-18 ENCOUNTER — TELEPHONE (OUTPATIENT)
Dept: FAMILY MEDICINE | Facility: CLINIC | Age: 85
End: 2020-06-18

## 2020-06-18 ENCOUNTER — ANTICOAGULATION THERAPY VISIT (OUTPATIENT)
Dept: NURSING | Facility: CLINIC | Age: 85
End: 2020-06-18

## 2020-06-18 DIAGNOSIS — Z79.01 LONG TERM CURRENT USE OF ANTICOAGULANT THERAPY: ICD-10-CM

## 2020-06-18 DIAGNOSIS — I48.92 ATRIAL FLUTTER, UNSPECIFIED TYPE (H): ICD-10-CM

## 2020-06-18 LAB
CAPILLARY BLOOD COLLECTION: NORMAL
INR PPP: 2.2 (ref 0.86–1.14)

## 2020-06-18 PROCEDURE — 99207 ZZC NO CHARGE NURSE ONLY: CPT

## 2020-06-18 PROCEDURE — 36416 COLLJ CAPILLARY BLOOD SPEC: CPT | Performed by: FAMILY MEDICINE

## 2020-06-18 PROCEDURE — 85610 PROTHROMBIN TIME: CPT | Performed by: FAMILY MEDICINE

## 2020-06-18 NOTE — PROGRESS NOTES
ANTICOAGULATION FOLLOW-UP CLINIC VISIT    Patient Name:  Tanya Graham  Date:  2020  Contact Type:  Telephone with patient's daughter, Faye    SUBJECTIVE:  Patient Findings     Positives:   Change in medications (Pt met with Dr. Sinha on , plan is to get PA for Eliquis)    Comments:   Cardiology follow up today        Clinical Outcomes     Negatives:   Major bleeding event, Thromboembolic event, Anticoagulation-related hospital admission, Anticoagulation-related ED visit, Anticoagulation-related fatality    Comments:   Cardiology follow up today           OBJECTIVE    Recent labs: (last 7 days)     20  0855   INR 2.20*       ASSESSMENT / PLAN  INR assessment THER    Recheck INR In: 4 DAYS    INR Location Clinic      Anticoagulation Summary  As of 2020    INR goal:   2.0-3.0   TTR:   --   INR used for dosin.20 (2020)   Warfarin maintenance plan:   2.5 mg (5 mg x 0.5) every day   Full warfarin instructions:   2.5 mg every day   Weekly warfarin total:   17.5 mg   Plan last modified:   Rhianna Bro RN (2020)   Next INR check:   2020   Priority:   High   Target end date:   2021    Indications    Atrial flutter  unspecified type (H) [I48.92]  Long term current use of anticoagulant therapy [Z79.01]             Anticoagulation Episode Summary     INR check location:       Preferred lab:       Send INR reminders to:   [facility]    Comments:         Anticoagulation Care Providers     Provider Role Specialty Phone number    Miriam Bird MD Referring [facility] [phone]            See the Encounter Report to view Anticoagulation Flowsheet and Dosing Calendar (Go to Encounters tab in chart review, and find the Anticoagulation Therapy Visit)        Rhianna Bro RN

## 2020-06-18 NOTE — TELEPHONE ENCOUNTER
PA Initiation    Medication: Eliquis  Insurance Company: [employer] - Phone [phone] Fax [phone]  Pharmacy Filling the Rx: [id number] - [place], [place] - [address]  Filling Pharmacy Phone: [phone]  Filling Pharmacy Fax: [phone]  Start Date: 6/18/2020

## 2020-06-19 ENCOUNTER — TELEPHONE (OUTPATIENT)
Dept: FAMILY MEDICINE | Facility: CLINIC | Age: 85
End: 2020-06-19

## 2020-06-19 DIAGNOSIS — I48.92 ATRIAL FLUTTER, UNSPECIFIED TYPE (H): ICD-10-CM

## 2020-06-19 NOTE — TELEPHONE ENCOUNTER
[facility] calling regarding the Eliquis prescription and the insurance company will cover and the cardiology didn't have any directions on what dose to start.  The starter pack that was prescribed is generally used for people with DVTs and per the cardiologist they don't typically use Eliquis that way.    They need a new script sent today in order for pt to start tonight    Please advise    Charlotte Welch RN, BSN

## 2020-06-19 NOTE — TELEPHONE ENCOUNTER
"Patient's daughter calls, concerned with \"starter pack\" of eliquis. She states cardiologist told her \"they never prescribe a starter pack for eliquis,\" and daughter is concerned patient's blood will get too thin, as she has already been taking warfarin. Huddled with provider who prescribed medication. She states it's okay to start patient on eliquis without loading dose, if daughter would prefer. Spoke with daughter and explained options, daughter prefers eliquis with no loading dose. Adjusted prescription. Called [facility] and notified of change, they verbalize receipt of order and understanding.  Key Vergara, BSN, RN, PHN    "

## 2020-06-20 DIAGNOSIS — I10 ESSENTIAL HYPERTENSION, BENIGN: ICD-10-CM

## 2020-06-22 NOTE — TELEPHONE ENCOUNTER
Routing refill request to provider for review/approval because:  Labs out of range:  BP  BP Readings from Last 3 Encounters:   06/17/20 (!) 156/71   06/07/20 (!) 141/82   11/07/19 120/74     Charlotte Welch RN, BSN

## 2020-06-22 NOTE — TELEPHONE ENCOUNTER
Prior Authorization Not Needed per Insurance    Medication: Eliquis  Insurance Company: [employer] - Phone [phone] Fax [phone]  Expected CoPay:      Pharmacy Filling the Rx: [id number] - [place], [place] - [address]  Pharmacy Notified: Yes  Patient Notified: Yes

## 2020-06-23 RX ORDER — ATENOLOL 50 MG/1
TABLET ORAL
Qty: 90 TABLET | Refills: 1 | Status: SHIPPED | OUTPATIENT
Start: 2020-06-23 | End: 2020-11-03

## 2020-06-25 ENCOUNTER — DOCUMENTATION ONLY (OUTPATIENT)
Dept: NURSING | Facility: CLINIC | Age: 85
End: 2020-06-25

## 2020-06-25 NOTE — PROGRESS NOTES
ANTICOAGULATION  MANAGEMENT    Tanya Graham is being discharged from the [facility] Anticoagulation Management Program ([facility]).    Reason for discharge: warfarin replaced by alternate therapy, Eliquis    Anticoagulation episode resolved, [facility] referral closed and INR Standing order discontinued    If patient needs warfarin management in the future, please send a new referral      Rhianna Bro RN

## 2020-06-27 DIAGNOSIS — I10 ESSENTIAL HYPERTENSION, BENIGN: ICD-10-CM

## 2020-06-29 DIAGNOSIS — I48.92 ATRIAL FLUTTER, UNSPECIFIED TYPE (H): ICD-10-CM

## 2020-06-29 RX ORDER — HYDROCHLOROTHIAZIDE 12.5 MG/1
TABLET ORAL
Qty: 90 TABLET | Refills: 1 | Status: SHIPPED | OUTPATIENT
Start: 2020-06-29 | End: 2020-11-05

## 2020-07-01 ENCOUNTER — HOSPITAL ENCOUNTER (OUTPATIENT)
Dept: PHYSICAL THERAPY | Facility: CLINIC | Age: 85
Setting detail: THERAPIES SERIES
End: 2020-07-01
Attending: FAMILY MEDICINE
Payer: MEDICARE

## 2020-07-01 DIAGNOSIS — I48.92 ATRIAL FLUTTER, UNSPECIFIED TYPE (H): ICD-10-CM

## 2020-07-01 DIAGNOSIS — R42 VERTIGO: ICD-10-CM

## 2020-07-01 PROCEDURE — 97161 PT EVAL LOW COMPLEX 20 MIN: CPT | Mod: GP | Performed by: PHYSICAL THERAPIST

## 2020-07-01 NOTE — TELEPHONE ENCOUNTER
Routing refill request to provider for review/approval because:  Medication discontinued from medication list 6/15/2020

## 2020-07-01 NOTE — PROGRESS NOTES
"                                                                                              [facility]        OUTPATIENT PHYSICAL THERAPY FUNCTIONAL EVALUATION  PLAN OF TREATMENT FOR OUTPATIENT REHABILITATION  (COMPLETE FOR INITIAL CLAIMS ONLY)  Patient's Last Name, First Name, M.I.  YOB: 1931  Tanya Graham     Provider's Name   [facility]   Medical Record No.  [id number]     Start of Care Date:  07/01/20   Onset Date:  06/07/20   Type:     _X__PT   ____OT  ____SLP Medical Diagnosis:  Vertigo R42      PT Diagnosis:  gait instability Visits from SOC:  1                              __________________________________________________________________________________  Plan of Treatment/Functional Goals:  balance training, gait training, neuromuscular re-education, strengthening           GOALS  1  Elma will score at least 19/24 on DGI using SEC indicating reduced fall risk for greater safety with community mobility.  08/12/20    2  Elma will be able to  narrow KIMMY on compliant surfaces while performing dynamic movements of head/body, moving/reaching out of her KIMMY with effective balance reactions and no LOB for greater safety negotiating uneven terrain in the yard and while gardening.  08/12/20    3  Elma will be able to perform sit to stand from 18\" chair without UE support at least 8 times in a row indicating improved functional LE strength and stability to reduce fall risk.  08/12/20         Therapy Frequency:  2 times/Week   Predicted Duration of Therapy Intervention:  6 weeks    Bridget Fernandez, PT                                    I CERTIFY THE NEED FOR THESE SERVICES FURNISHED UNDER        THIS PLAN OF TREATMENT AND WHILE UNDER MY CARE     (Physician co-signature of this document indicates review and certification of the therapy plan).                Certification Date From:  07/01/20   Certification Date To:  08/12/20    Referring Provider:  " Janine Medel, Gladis Deras MD     Initial Assessment  See Epic Evaluation- Start of Care Date: 07/01/20

## 2020-07-01 NOTE — PROGRESS NOTES
07/01/20 1500   Quick Adds   Quick Adds Vestibular Eval;Certification   Type of Visit Initial OP PT Evaluation   General Information   Start of Care Date 07/01/20   Referring Physician Gladis Martinez MD    Orders Evaluate and Treat as Indicated   Order Date 06/17/20   Medical Diagnosis Vertigo R42    Onset of illness/injury or Date of Surgery 06/07/20   Surgical/Medical history reviewed Yes   Pertinent history of current vestibular problem (include personal factors and/or comorbidities that impact the POC)  Motion sickness  (years ago)   Pertinent history of current problem (include personal factors and/or comorbidities that impact the POC) Elma reports she woke on 6/7 with dizziness, nausea and vomiting. She was taken to the ER for evaluation. It was discovered she has A flutter. She is now being treated with medications and recently saw a cardiologist. Patient reports only 1 other brief episode of dizziness about a week after the first. Since then, she has not experienced any dizziness these past couple weeks. Patient has not taken any meclizine the past couple weeks. She states her balance is ok, at baseline. She denies hearing or vision changes, She denies recent illness.    Pertinent Visual History  wears bifocals   Prior level of function comment ambulates with cane outdoors, no AD indoors. Pt gardens, pushes dog stroller thru the yard for extra support. pt recieved PT for balance and strength training about a year ago, per pt, she continues to use HEP prescribed.   Diagnostic Tests MRI;Other   MRI Results Results   MRI results no acute abnormalities on MRI. 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   Previous/Current Treatment Medication(s)   Current Community Support Family/friend caregiver   Patient role/Employment history Retired    Living environment House/[facility]   Home/Community Accessibility Comments ok on stairs with railing, hasnt driven since dizziness started   Current Assistive Devices Standard Cane   ADL Devices   (none)   Assistive Devices Comments cane out of the house only   Patient/Family Goals Statement be released to drive. per daughter MD wanted pt assessed for fall risk before staying alone.   Fall Risk Screen   Fall screen completed by PT   Have you fallen 2 or more times in the past year? No   Have you fallen and had an injury in the past year? No   Is patient a fall risk? Yes   Abuse Screen (yes response referral indicated)   Physical Signs of Abuse Present no   Functional Scales   Functional Scales and Outcomes DHI 0/100- no dizziness   Pain   Patient currently in pain No   Cognitive Status Examination   Orientation orientation to person, place and time   Level of Consciousness alert   Follows Commands and Answers Questions 100% of the time   Personal Safety and Judgment intact   Posture   Posture Forward head position;Protracted shoulders;Kyphosis   Strength   Strength Comments functional weakness noted in legs with transfers and ambulation. pt requires 3 attempts to achieve standing without UE support.   Bed Mobility   Bed Mobility Comments indep   Transfer Skills   Transfer Comments indep with UE support   Gait Special Tests   Gait Special Tests DYNAMIC GAIT INDEX   Gait Special Tests Dynamic Gait Index   Score out of 24 16   Comments with SEC. 19 or less indicates increased fall risk.   Balance Special Tests   Balance Special Tests Modified CTSIB Conditions   Balance Special Tests Modified CTSIB Conditions   Condition 1, seconds 30 Seconds   Condition 2, seconds 30 Seconds   Condition 4, seconds 30 Seconds   Condition 5, seconds 30 Seconds  (increased sway and CGA with gait belt for safety)   Cervicogenic Screen   Neck ROM sufficient for positional testing   Oculomotor Exam   Smooth Pursuit Other   Smooth Pursuit  Comment saccadic intrusions possibly related to age   Saccades Normal   VOR Normal   Rapid Head Thrust Comments unable to assess due to pt guarding   Infrared Goggle Exam or Frenzel Lense Exam   Vestibular Suppressant in Last 24 Hours? No   Exam completed with Infrared Goggles   Spontaneous Nystagmus Negative   Gaze Evoked Nystagmus Negative   Head Shake Horizontal Nystagmus Negative   Amarilys-Hallpike (right) Negative   Amarilys-Hallpike (Left) Negative   HSCC Supine Roll Test (Right) Negative   HSCC Supine Roll Test (Left) Negative   Planned Therapy Interventions   Planned Therapy Interventions balance training;gait training;neuromuscular re-education;strengthening   Clinical Impression   Criteria for Skilled Therapeutic Interventions Met yes, treatment indicated   PT Diagnosis gait instability   Influenced by the following impairments functional LE weakness, deconditioning, reduced postural stability on compliant surfaces and with eyes closed, impaired gait   Functional limitations due to impairments transfers, ambulation, walking in yard to garden   Clinical Presentation Stable/Uncomplicated   Clinical Presentation Rationale dizziness resolved   Clinical Decision Making (Complexity) Low complexity   Therapy Frequency 2 times/Week   Predicted Duration of Therapy Intervention (days/wks) 6 weeks   Risk & Benefits of therapy have been explained Yes   Patient, Family & other staff in agreement with plan of care Yes   Education Assessment   Barriers to Learning No barriers   GOALS   PT Eval Goals 1;2;3   Goal 1   Goal Identifier 1   Goal Description Elma will score at least 19/24 on DGI using SEC indicating reduced fall risk for greater safety with community mobility.   Target Date 08/12/20   Goal 2   Goal Identifier 2   Goal Description Elma will be able to  narrow KIMMY on compliant surfaces while performing dynamic movements of head/body, moving/reaching out of her KIMMY with effective balance reactions and no LOB for  "greater safety negotiating uneven terrain in the yard and while gardening.   Target Date 08/12/20   Goal 3   Goal Identifier 3   Goal Description Elma will be able to perform sit to stand from 18\" chair without UE support at least 8 times in a row indicating improved functional LE strength and stability to reduce fall risk.   Target Date 08/12/20   Total Evaluation Time   PT Eval, Low Complexity Minutes (24144) 50   Therapy Certification   Certification date from 07/01/20   Certification date to 08/12/20   Medical Diagnosis Vertigo R42      "

## 2020-07-02 RX ORDER — WARFARIN SODIUM 5 MG/1
TABLET ORAL
Qty: 30 TABLET | Refills: 0 | OUTPATIENT
Start: 2020-07-02

## 2020-07-08 NOTE — PROGRESS NOTES
Outpatient Physical Therapy Discharge Note     Patient: Tanya Graham  : 1931    Beginning/End Dates of Reporting Period:  Patient was evaluated 20 for vertigo. She was no longer experiencing dizziness and had no s/s of peripheral vestibular deficit or BPPV. She does demonstrate instability with increased fall risk so was scheduled for 6 weeks of PT. She called 20 stating she has a knee problem and feels she needs to take care of that before she can participate in PT so she requested discharge.    Referring Provider: Gladis Martinez MD     Therapy Diagnosis: gait instability     Plan:  Discharge from therapy.    Discharge:    Reason for Discharge: Patient chooses to discontinue therapy.    }

## 2020-07-17 DIAGNOSIS — I48.92 ATRIAL FLUTTER, UNSPECIFIED TYPE (H): ICD-10-CM

## 2020-07-17 NOTE — TELEPHONE ENCOUNTER
Duplicate. Pt has refills remaining at pharmacy. Denial sent with note to pharmacy requesting they review and fill medication.     Vania Marr RN   [facility] -- Triage Nurse

## 2020-08-13 ENCOUNTER — OFFICE VISIT (OUTPATIENT)
Dept: FAMILY MEDICINE | Facility: CLINIC | Age: 85
End: 2020-08-13
Payer: MEDICARE

## 2020-08-13 ENCOUNTER — ANCILLARY PROCEDURE (OUTPATIENT)
Dept: GENERAL RADIOLOGY | Facility: CLINIC | Age: 85
End: 2020-08-13
Attending: FAMILY MEDICINE
Payer: MEDICARE

## 2020-08-13 VITALS
RESPIRATION RATE: 12 BRPM | WEIGHT: 176 LBS | HEART RATE: 65 BPM | SYSTOLIC BLOOD PRESSURE: 112 MMHG | TEMPERATURE: 98.1 F | BODY MASS INDEX: 31.18 KG/M2 | OXYGEN SATURATION: 96 % | DIASTOLIC BLOOD PRESSURE: 80 MMHG

## 2020-08-13 DIAGNOSIS — R42 VERTIGO: ICD-10-CM

## 2020-08-13 DIAGNOSIS — M17.10 ARTHRITIS OF KNEE: ICD-10-CM

## 2020-08-13 DIAGNOSIS — Z79.01 LONG TERM CURRENT USE OF ANTICOAGULANT THERAPY: ICD-10-CM

## 2020-08-13 DIAGNOSIS — Z85.3 HX: BREAST CANCER: ICD-10-CM

## 2020-08-13 DIAGNOSIS — I48.92 ATRIAL FLUTTER, UNSPECIFIED TYPE (H): Primary | ICD-10-CM

## 2020-08-13 PROCEDURE — 73562 X-RAY EXAM OF KNEE 3: CPT | Mod: LT

## 2020-08-13 PROCEDURE — 99214 OFFICE O/P EST MOD 30 MIN: CPT | Performed by: FAMILY MEDICINE

## 2020-08-13 RX ORDER — MECLIZINE HYDROCHLORIDE 25 MG/1
25 TABLET ORAL EVERY 6 HOURS PRN
Qty: 30 TABLET | Refills: 1 | Status: SHIPPED | OUTPATIENT
Start: 2020-08-13 | End: 2020-12-16

## 2020-08-13 NOTE — PROGRESS NOTES
Subjective     Tanya Graham is a 89 year old female who presents to clinic today for the following health issues:    HPI       ED/UC Followup:    Facility:  [facility]  Date of visit: 6/7/2020  Reason for visit: Dizziness, Atrial flutter  Current Status: Good      Hx of going to cardiology-the flutter but she had no sx other than dizzyness-normal EF, valve issues noted, , and dizziness center-she was normal, been to neurology    Per cardiology-Typical atrial flutter: Duration unknown. Likely longstanding. Not related to her vertigo symptoms. Asymptomatic with normal activity tolerance. Given her asymptomatic state, rate controlled on atenolol, I recommended leaving her in atrial flutter. If we did do an atrial flutter ablation, which was discussed, she would likely go on to have difficulty with atrial fibrillation in the future. CHADS2 Vasc score is four for age (two), female gender and hypertension. She is appropriately on warfarin which should be continued indefinitely. I did recommend updating her echocardiogram.    Is on Eliquis currently.    No dizziness now but is asking for meclizine, in case it happens again.  PROBLEMS TO ADD ON...    Recent Labs   Lab Test 06/07/20  1437 11/07/19  1041 12/26/18  0852 11/28/17  0907   LDL  --  93 85 93   HDL  --  52 45* 49*   TRIG  --  114 122 119   ALT  --  19 19 21   CR 0.65 0.73 0.67 0.73   GFRESTIMATED 79 73 78 76   GFRESTBLACK >90 85 >90 >90   POTASSIUM 4.0 4.4 4.7 4.9   TSH 3.23  --  3.74 2.87      BP Readings from Last 3 Encounters:   08/13/20 112/80   06/17/20 (!) 156/71   06/07/20 (!) 141/82    Wt Readings from Last 3 Encounters:   08/13/20 79.8 kg (176 lb)   06/17/20 78.5 kg (173 lb)   11/07/19 82.1 kg (181 lb)                    Reviewed and updated as needed this visit by Provider         Review of Systems   Constitutional, HEENT, cardiovascular, pulmonary, gi and gu systems are negative, except as otherwise noted.      Objective    LMP 06/02/1981   There  is no height or weight on file to calculate BMI.  Physical Exam   GENERAL: healthy, alert and no distress  NECK: no adenopathy, no asymmetry, masses, or scars and thyroid normal to palpation  RESP: lungs clear to auscultation - no rales, rhonchi or wheezes  BREAST: normal  On the right without masses, tenderness or nipple discharge and no palpable axillary masses or adenopathy, the left hx of surgery and radiation, firm hard breast, with deformity, noted mostly on the inner lower side(not new)  CV: irregular rhythm, normal rate no murmur, click or rub, 1+ peripheral edema in feet and ankles noted and peripheral pulses strong  ABDOMEN: soft, nontender, no hepatosplenomegaly, no masses and bowel sounds normal  MS: no gross musculoskeletal defects noted, left knee with hypertrophy  SKIN: no suspicious lesions or rashes  NEURO: weakness of core, hard to step up on the table, needed help, sensory exam grossly normal and mentation intact  Uses cane  PSYCH: mentation appears normal, affect normal/bright    Diagnostic Test Results:  Labs reviewed in Epic        Assessment & Plan     1. Atrial flutter, unspecified type (H)  On eliquis for life, doing well, currently in atrial flutter, reviewed notes and was regular 11/19    2. Vertigo  Refilled, no need for this now, but is just in case per pt  - meclizine (ANTIVERT) 25 MG tablet; Take 1 tablet (25 mg) by mouth every 6 hours as needed for dizziness  Dispense: 30 tablet; Refill: 1    3. Arthritis of knee  Pain not new, will start work up, she would consider PT vs orthopedic referral  Will try topical, pt cannot take NSAIDS orally due to blood thinners  - XR Knee Left 3 Views; Future  - diclofenac (VOLTAREN) 1 % topical gel; Apply 4 g topically 4 times daily  Dispense: 100 g; Refill: 3    4. HX: breast cancer  Pt opting to cont self breast exams and annual exams here, and stop mammograms    5. Long term current use of anticoagulant therapy  See above, doing well       BMI:  "  Estimated body mass index is 31.18 kg/m  as calculated from the following:    Height as of 11/7/19: 1.6 m (5' 3\").    Weight as of this encounter: 79.8 kg (176 lb).   Weight management plan: Discussed healthy diet and exercise guidelines    Recommend taking stairs daily, stationary bike etc, to help improve core strength and thigh strength  Work on weight loss  Regular exercise    Return in about 3 months (around 11/13/2020) for Preventive Visit, In-Clinic Visit.    Miriam Bird MD  [facility]    "

## 2020-08-14 ENCOUNTER — TELEPHONE (OUTPATIENT)
Dept: FAMILY MEDICINE | Facility: CLINIC | Age: 85
End: 2020-08-14

## 2020-08-14 DIAGNOSIS — M17.10 ARTHRITIS OF KNEE: Primary | ICD-10-CM

## 2020-08-14 NOTE — TELEPHONE ENCOUNTER
For the recent XR note:  Miriam Bird MD  P  Triage Pool               Arthritis is noted in the knee, moderate with bone spurring.          Will there be a treatment plan?    Odalys Rahman RN Flex

## 2020-08-14 NOTE — LETTER
August 20, 2020      Tanyacaesar Graham  [address]  [place] [address]        Dear ,    We are writing to inform you of your test results.    Arthritis is noted in the knee, moderate with bone spurring.    XR KNEE LT 3 VW 8/13/2020 9:16 AM      HISTORY: Arthritis of knee     COMPARISON: 10/3/2008                                                                      IMPRESSION: Medial compartment moderate joint space narrowing and  osteophytic spurring. Suspected posterior joint line articular body.  Meniscal chondrocalcinosis.     VICENTE DIAZ MD    If you have any questions or concerns, please call the clinic at the number listed above.       Sincerely,        Miriam Bird MD

## 2020-08-20 NOTE — TELEPHONE ENCOUNTER
Patient called wanted Xray results, read her Dr. Bird message:Arthritis is noted in the knee, moderate with bone spurring. Patient then asked if she could get a copy of this results since she doesn't use mychart. Created a letter and mailed results out to patient. Taylor Mary

## 2020-09-25 DIAGNOSIS — I10 ESSENTIAL HYPERTENSION, BENIGN: ICD-10-CM

## 2020-09-25 RX ORDER — LISINOPRIL 10 MG/1
TABLET ORAL
Qty: 90 TABLET | Refills: 1 | Status: SHIPPED | OUTPATIENT
Start: 2020-09-25 | End: 2021-02-25

## 2020-09-25 NOTE — TELEPHONE ENCOUNTER
Prescription approved per [facility] Refill Protocol.    Stephanie Woods RN

## 2020-10-05 DIAGNOSIS — I10 ESSENTIAL HYPERTENSION, BENIGN: ICD-10-CM

## 2020-10-05 RX ORDER — ATENOLOL 50 MG/1
TABLET ORAL
Qty: 90 TABLET | Refills: 1 | OUTPATIENT
Start: 2020-10-05

## 2020-10-05 NOTE — TELEPHONE ENCOUNTER
Routing refill request to provider for review/approval because:  Given #90 + 1 refill (6 months worth) on 6/15/2020    Serjio Todd RN

## 2020-11-02 DIAGNOSIS — I10 ESSENTIAL HYPERTENSION, BENIGN: ICD-10-CM

## 2020-11-03 RX ORDER — ATENOLOL 50 MG/1
TABLET ORAL
Qty: 90 TABLET | Refills: 1 | Status: SHIPPED | OUTPATIENT
Start: 2020-11-03 | End: 2021-04-29

## 2020-11-05 ENCOUNTER — NURSE TRIAGE (OUTPATIENT)
Dept: FAMILY MEDICINE | Facility: CLINIC | Age: 85
End: 2020-11-05

## 2020-11-05 DIAGNOSIS — I10 ESSENTIAL HYPERTENSION, BENIGN: ICD-10-CM

## 2020-11-05 RX ORDER — HYDROCHLOROTHIAZIDE 12.5 MG/1
25 TABLET ORAL DAILY
Qty: 60 TABLET | Refills: 0 | COMMUNITY
Start: 2020-11-05 | End: 2020-11-17

## 2020-11-05 NOTE — TELEPHONE ENCOUNTER
Called pt, informed, agrees, med updated, pt will discuss at upcoming appointment  Laurence Cartagena RN, BSN  Message handled by CLINIC NURSE.

## 2020-11-05 NOTE — TELEPHONE ENCOUNTER
Pt calls,    S-(situation): ankle swelling    B-(background): see triage note, pt noticed more ankle swelling, better in am, has been on hydrochlorothiazide 12.5 mg daily x years, feels not to effective, does not cause increase urination, wonders if needs diuretic increase, wants SD to review and advise    A-(assessment): hypertension with pedal edema    R-(recommendations): routed to SD, please review and advise plan, route to inform pt of plan    BP Readings from Last 2 Encounters:   08/13/20 112/80   06/17/20 (!) 156/71     Lab Results   Component Value Date    CR 0.65 06/07/2020     Lab Results   Component Value Date    POTASSIUM 4.0 06/07/2020       Reason for Disposition    [1] MILD swelling of both ankles (i.e., pedal edema) AND [2] is a chronic symptom (recurrent or ongoing AND present > 4 weeks)    Additional Information    Negative: Chest pain    Negative: Followed an ankle injury    Negative: Ankle pain is main symptom    Swelling of both ankles (i.e., pedal edema)    Negative: Severe difficulty breathing (e.g., struggling for each breath, speaks in single words)    Negative: Looks like a broken bone or dislocated joint (e.g., crooked or deformed)    Negative: Sounds like a life-threatening emergency to the triager    Negative: Chest pain    Negative: Followed a leg injury    Negative: [1] Small area of swelling AND [2] followed an insect bite to the area    Negative: Swelling of one ankle joint    Negative: Swelling of knee is main symptom    Negative: Pregnant    Negative: Postpartum (from 0 to 6 weeks after delivery)    Negative: Difficulty breathing at rest    Negative: Entire foot is cool or blue in comparison to other side    Negative: [1] Can't walk or can barely walk AND [2] new onset    Negative: [1] Difficulty breathing with exertion (e.g., walking) AND [2] new onset or worsening    Negative: [1] Red area or streak AND [2] fever    Negative: [1] Swelling is painful to touch AND [2] fever     "Negative: [1] Cast on leg or ankle AND [2] now increased pain    Negative: Patient sounds very sick or weak to the triager    Negative: SEVERE leg swelling (e.g., swelling extends above knee, entire leg is swollen, weeping fluid)    Negative: [1] Red area or streak [2] large (> 2 in. or 5 cm)    Negative: [1] Thigh or calf pain AND [2] only 1 side AND [3] present > 1 hour    Negative: [1] Thigh, calf, or ankle swelling AND [2] only 1 side    Negative: [1] Thigh, calf, or ankle swelling AND [2] bilateral AND [3] 1 side is more swollen    Negative: [1] MODERATE leg swelling (e.g., swelling extends up to knees) AND [2] new onset or worsening    Negative: Swelling of face, arm or hands  (Exception: slight puffiness of fingers occurring during hot weather)    Negative: Looks like a boil, infected sore, deep ulcer or other infected rash (spreading redness, pus)    Negative: [1] MILD swelling of both ankles (i.e., pedal edema) AND [2] new onset or worsening    Answer Assessment - Initial Assessment Questions  1. LOCATION: \"Which joint is swollen?\"      Ankles, r>left, foot also  2. ONSET: \"When did the swelling start?\"      On hydrochlorothiazide 12.5 daily, worse past month  3. SIZE: \"How large is the swelling?\"      Can still wear shoes, better in am  4. PAIN: \"Is there any pain?\" If so, ask: \"How bad is it?\" (Scale 1-10; or mild, moderate, severe)      No  5. CAUSE: \"What do you think caused the swollen joint?\"      Edema issue  6. OTHER SYMPTOMS: \"Do you have any other symptoms?\" (e.g., fever, chest pain, difficulty breathing, calf pain)      No  7. PREGNANCY: \"Is there any chance you are pregnant?\" \"When was your last menstrual period?\"      N/a    Protocols used: LEG SWELLING AND EDEMA-A-, ANKLE SWELLING-A-AH    Laurence Cartagena RN, BSN  Message handled by CLINIC NURSE.    "

## 2020-11-16 NOTE — PROGRESS NOTES
Pre-Visit Planning     Future Appointments   Date Time Provider Department Center   11/17/2020 10:00 AM Miriam Bird MD [facility]     Arrival Time for this Appointment:  9:35 AM   Appointment Notes for this encounter:   fasting annual wellness    Questionnaires Reviewed/Assigned  No additional questionnaires are needed    Last OV with provider  08/13/2020 SD  Assessment & Plan   1. Atrial flutter, unspecified type (H)  On eliquis for life, doing well, currently in atrial flutter, reviewed notes and was regular 11/19  2. Vertigo  Refilled, no need for this now, but is just in case per pt  - meclizine (ANTIVERT) 25 MG tablet; Take 1 tablet (25 mg) by mouth every 6 hours as needed for dizziness  Dispense: 30 tablet; Refill: 1  3. Arthritis of knee  Pain not new, will start work up, she would consider PT vs orthopedic referral  Will try topical, pt cannot take NSAIDS orally due to blood thinners  - XR Knee Left 3 Views; Future  - diclofenac (VOLTAREN) 1 % topical gel; Apply 4 g topically 4 times daily  Dispense: 100 g; Refill: 3  4. HX: breast cancer  Pt opting to cont self breast exams and annual exams here, and stop mammograms  5. Long term current use of anticoagulant therapy  See above, doing well    Hospital ER Visits  none    Specialty Visits  none    Imaging and Lab Review  none  Recent Procedures  none    Health Maintenance Due   Topic Date Due     ANNUAL REVIEW OF HM ORDERS  01/12/1931     ZOSTER IMMUNIZATION (1 of 2) 01/12/1981     MAMMO SCREENING  10/01/2019     INFLUENZA VACCINE (1) 09/01/2020     DEXA  09/14/2020     MEDICARE ANNUAL WELLNESS VISIT  11/07/2020     Current Outpatient Medications   Medication     apixaban ANTICOAGULANT (ELIQUIS) 5 MG tablet     atenolol (TENORMIN) 50 MG tablet     Calcium-Vitamin D-Vitamin K 500-100-40 MG-UNT-MCG CHEW     cholecalciferol (VITAMIN-D) 1000 UNIT capsule     diclofenac (VOLTAREN) 1 % topical gel     furosemide (LASIX) 20 MG tablet     hydrochlorothiazide  (HYDRODIURIL) 12.5 MG tablet     lisinopril (ZESTRIL) 10 MG tablet     meclizine (ANTIVERT) 25 MG tablet     Multiple Vitamins-Minerals (OCUVITE-LUTEIN PO)     Niacinamide 500 MG TBCR     ondansetron (ZOFRAN ODT) 4 MG ODT tab     simvastatin (ZOCOR) 10 MG tablet     No current facility-administered medications for this visit.        Refills due?none  Patient is not active on [employer]. Encouraged [employer] activation.    Patient preferred phone number: [phone]  Spoke with pt who states she has no new concerns and no changes in medications

## 2020-11-17 ENCOUNTER — OFFICE VISIT (OUTPATIENT)
Dept: FAMILY MEDICINE | Facility: CLINIC | Age: 85
End: 2020-11-17
Payer: MEDICARE

## 2020-11-17 VITALS
WEIGHT: 168 LBS | BODY MASS INDEX: 29.77 KG/M2 | DIASTOLIC BLOOD PRESSURE: 64 MMHG | SYSTOLIC BLOOD PRESSURE: 120 MMHG | RESPIRATION RATE: 24 BRPM | TEMPERATURE: 98.5 F | HEART RATE: 72 BPM | HEIGHT: 63 IN

## 2020-11-17 DIAGNOSIS — E78.2 MIXED HYPERLIPIDEMIA: ICD-10-CM

## 2020-11-17 DIAGNOSIS — E55.9 VITAMIN D INSUFFICIENCY: ICD-10-CM

## 2020-11-17 DIAGNOSIS — I48.92 ATRIAL FLUTTER, UNSPECIFIED TYPE (H): ICD-10-CM

## 2020-11-17 DIAGNOSIS — Z85.828 HISTORY OF BASAL CELL CARCINOMA: ICD-10-CM

## 2020-11-17 DIAGNOSIS — Z00.00 ENCOUNTER FOR MEDICARE ANNUAL WELLNESS EXAM: Primary | ICD-10-CM

## 2020-11-17 DIAGNOSIS — I10 ESSENTIAL HYPERTENSION, BENIGN: ICD-10-CM

## 2020-11-17 DIAGNOSIS — C50.312 MALIGNANT NEOPLASM OF LOWER-INNER QUADRANT OF LEFT FEMALE BREAST, UNSPECIFIED ESTROGEN RECEPTOR STATUS (H): ICD-10-CM

## 2020-11-17 LAB
ERYTHROCYTE [DISTWIDTH] IN BLOOD BY AUTOMATED COUNT: 14.3 % (ref 10–15)
HCT VFR BLD AUTO: 45.1 % (ref 35–47)
HGB BLD-MCNC: 14.8 G/DL (ref 11.7–15.7)
MCH RBC QN AUTO: 30.8 PG (ref 26.5–33)
MCHC RBC AUTO-ENTMCNC: 32.8 G/DL (ref 31.5–36.5)
MCV RBC AUTO: 94 FL (ref 78–100)
PLATELET # BLD AUTO: 175 10E9/L (ref 150–450)
RBC # BLD AUTO: 4.81 10E12/L (ref 3.8–5.2)
WBC # BLD AUTO: 5.7 10E9/L (ref 4–11)

## 2020-11-17 PROCEDURE — 99213 OFFICE O/P EST LOW 20 MIN: CPT | Mod: 25 | Performed by: FAMILY MEDICINE

## 2020-11-17 PROCEDURE — G0439 PPPS, SUBSEQ VISIT: HCPCS | Performed by: FAMILY MEDICINE

## 2020-11-17 PROCEDURE — 80061 LIPID PANEL: CPT | Performed by: FAMILY MEDICINE

## 2020-11-17 PROCEDURE — 80053 COMPREHEN METABOLIC PANEL: CPT | Performed by: FAMILY MEDICINE

## 2020-11-17 PROCEDURE — 36415 COLL VENOUS BLD VENIPUNCTURE: CPT | Performed by: FAMILY MEDICINE

## 2020-11-17 PROCEDURE — 85027 COMPLETE CBC AUTOMATED: CPT | Performed by: FAMILY MEDICINE

## 2020-11-17 RX ORDER — HYDROCHLOROTHIAZIDE 25 MG/1
25 TABLET ORAL DAILY
Qty: 90 TABLET | Refills: 1 | Status: SHIPPED | OUTPATIENT
Start: 2020-11-17 | End: 2021-04-29

## 2020-11-17 RX ORDER — SIMVASTATIN 10 MG
10 TABLET ORAL AT BEDTIME
Qty: 90 TABLET | Refills: 3 | Status: SHIPPED | OUTPATIENT
Start: 2020-11-17 | End: 2021-04-29

## 2020-11-17 RX ORDER — NIACINAMIDE 500 MG
1 TABLET, EXTENDED RELEASE ORAL DAILY
Qty: 90 TABLET | Refills: 3 | Status: SHIPPED | OUTPATIENT
Start: 2020-11-17 | End: 2022-12-06

## 2020-11-17 ASSESSMENT — MIFFLIN-ST. JEOR: SCORE: 1152.2

## 2020-11-17 NOTE — PROGRESS NOTES
"  SUBJECTIVE:   Tanya Graham is a 89 year old female who presents for Preventive Visit.      Patient has been advised of split billing requirements and indicates understanding: Yes  Are you in the first 12 months of your Medicare Part B coverage?  No    Physical Health:    In general, how would you rate your overall physical health? good    Outside of work, how many days during the week do you exercise? 6-7 days/week    Outside of work, approximately how many minutes a day do you exercise?less than 15 minutes    If you drink alcohol do you typically have >3 drinks per day or >7 drinks per week? Not Applicable    Do you usually eat at least 4 servings of fruit and vegetables a day, include whole grains & fiber and avoid regularly eating high fat or \"junk\" foods? Yes    Do you have any problems taking medications regularly?  No    Do you have any side effects from medications? none    Needs assistance for the following daily activities: no assistance needed     Which of the following safety concerns are present in your home?  none identified     Hearing impairment: No    In the past 6 months, have you been bothered by leaking of urine? no    Mental Health:    In general, how would you rate your overall mental or emotional health? excellent  PHQ-2 Score:      Do you feel safe in your environment? Yes    Have you ever done Advance Care Planning? (For example, a Health Directive, POLST, or a discussion with a medical provider or your loved ones about your wishes): patient is working on it currently    Additional concerns to address?  No    Fall risk:  Fallen 2 or more times in the past year?: No  Any fall with injury in the past year?: No    Balance is abit off, not new, and has not fallen. She has been to the balance center, is trying to do exercise to help with this.    Patient presents for evaluation of hypertension.  She indicates that she is feeling well and denies any symptoms referable to her elevated blood " pressure. Specifically denies chest pain, palpitations, dyspnea, orthopnea, PND or peripheral edema. Current medication regimen is as listed below. Patient denies any side effects of medication.    BP has been 101/53 at home, she is feeling fine. Is not feeling tired or lightheaded.   Is taking hydrochlorothiazide 25mg, this is increase from 12.5mg a few weeks ago, due to ankle swelling.       cardiologist in [place], nothing was changed, this was in august , afllutter hx, taking eliquis long term    Reviewed carotid MRI.    Cognitive Screenin) Repeat 3 items (Leader, Season, Table)    2) Clock draw: NORMAL  3) 3 item recall: Recalls 3 objects  Results: 3 items recalled: COGNITIVE IMPAIRMENT LESS LIKELY    Mini-CogTM Copyright S [employer]. Licensed by the author for use in [facility]; reprinted with permission ([email or web address]@[email or web address]). All rights reserved.      Do you have sleep apnea, excessive snoring or daytime drowsiness?: no        PROBLEMS TO ADD ON...    Reviewed and updated as needed this visit by clinical staff  Tobacco  Allergies    Med Hx  Surg Hx  Fam Hx  Soc Hx        Reviewed and updated as needed this visit by Provider                Social History     Tobacco Use     Smoking status: Never Smoker     Smokeless tobacco: Never Used   Substance Use Topics     Alcohol use: No                           Current providers sharing in care for this patient include:   Patient Care Team:  Miriam Bird MD as PCP - General (Family Practice)  Miriam Bird MD (Family Practice)  Miriam Bird MD as Assigned PCP  Marsha Aleman, RN as Personal Advocate & Liaison (PAL) (Nurse)    The following health maintenance items are reviewed in Epic and correct as of today:  Health Maintenance   Topic Date Due     ANNUAL REVIEW OF HM ORDERS  [birth year]31     ZOSTER IMMUNIZATION (1 of 2) 1981     MAMMO SCREENING  10/01/2019     INFLUENZA VACCINE (1) 2020     DEXA  2020  "    FALL RISK ASSESSMENT  06/16/2021     MEDICARE ANNUAL WELLNESS VISIT  11/17/2021     ADVANCE CARE PLANNING  11/07/2024     DTAP/TDAP/TD IMMUNIZATION (4 - Td) 11/27/2027     PHQ-2  Completed     Pneumococcal Vaccine: 65+ Years  Completed     Pneumococcal Vaccine: Pediatrics (0 to 5 Years) and At-Risk Patients (6 to 64 Years)  Aged Out     IPV IMMUNIZATION  Aged Out     MENINGITIS IMMUNIZATION  Aged Out     HEPATITIS B IMMUNIZATION  Aged Out     Labs reviewed in EPIC  BP Readings from Last 3 Encounters:   11/17/20 120/64   08/13/20 112/80   06/17/20 (!) 156/71    Wt Readings from Last 3 Encounters:   11/17/20 76.2 kg (168 lb)   08/13/20 79.8 kg (176 lb)   06/17/20 78.5 kg (173 lb)                  Mammogram Screening: Mammo discussed,  declined by this patient.    ROS:  Constitutional, HEENT, cardiovascular, pulmonary, gi and gu systems are negative, except as otherwise noted.    OBJECTIVE:   /64 (BP Location: Right arm, Patient Position: Sitting, Cuff Size: Adult Regular)   Pulse 72   Temp 98.5  F (36.9  C) (Oral)   Resp 24   Ht 1.594 m (5' 2.75\")   Wt 76.2 kg (168 lb)   LMP 06/02/1981   BMI 30.00 kg/m   Estimated body mass index is 30 kg/m  as calculated from the following:    Height as of this encounter: 1.594 m (5' 2.75\").    Weight as of this encounter: 76.2 kg (168 lb).  EXAM:   GENERAL: healthy, alert and no distress  EYES: Eyes grossly normal to inspection,  and conjunctivae and sclerae normal  HENT: ear canals and TM's normal, nose and mouth without ulcers or lesions  NECK: no adenopathy, no asymmetry, masses, or scars and thyroid normal to palpation  RESP: lungs clear to auscultation - no rales, rhonchi or wheezes  CV: regular rate and rhythm, normal S1 S2, no S3 or S4, no murmur, click or rub, trace  peripheral edema noted and peripheral pulses strong  ABDOMEN: soft, nontender, no hepatosplenomegaly, no masses and bowel sounds normal  MS: extremities normal- no gross deformities noted  SKIN: " hyperpigmented rash on lower extremities bilt due to chronic swelling  NEURO: Normal strength and tone, mentation intact and speech normal  PSYCH: mentation appears normal, affect normal/bright    Diagnostic Test Results:  Labs reviewed in Epic    ASSESSMENT / PLAN:   1. Encounter for Medicare annual wellness exam  Normal exam, other than balance, cont exercise this winter  - Comprehensive metabolic panel  - Lipid panel reflex to direct LDL Fasting  - CBC with platelets    2. Malignant neoplasm of lower-inner quadrant of left female breast (H)  Stable for a long time, last exam was 6 months ago, will repeat in 6 months    3. Vitamin D insufficiency  Pt is taking     4. Atrial flutter, unspecified type (H)  Stable, seeing cards once yearly, cont eliquis as planned, pt is feeling well and in normal rhythem today  - apixaban ANTICOAGULANT (ELIQUIS) 5 MG tablet; Take 1 tablet (5 mg) by mouth 2 times daily  Dispense: 180 tablet; Refill: 1    5. Essential hypertension, benign  Controlled, refilled at the 25mg, as this is a higher dose due to ankle swelling on lower dose, checking lytes today  - hydrochlorothiazide (HYDRODIURIL) 25 MG tablet; Take 1 tablet (25 mg) by mouth daily  Dispense: 90 tablet; Refill: 1    6. Mixed hyperlipidemia  Refilled and checking lipids today  - Comprehensive metabolic panel  - simvastatin (ZOCOR) 10 MG tablet; Take 1 tablet (10 mg) by mouth At Bedtime  Dispense: 90 tablet; Refill: 3  - Lipid panel reflex to direct LDL Fasting  - CBC with platelets    7. History of basal cell carcinoma  Hx of this, derm recommended niacinamide, will refill, she has appt soon with derm and seems them often  - Niacinamide 500 MG TBCR; Take 1 tablet by mouth daily To prevent skin cancer, per dermatologist  Dispense: 90 tablet; Refill: 3    Patient has been advised of split billing requirements and indicates understanding: Yes    COUNSELING:  Reviewed preventive health counseling, as reflected in patient  "instructions       Regular exercise       Healthy diet/nutrition       Fall risk prevention    Estimated body mass index is 30 kg/m  as calculated from the following:    Height as of this encounter: 1.594 m (5' 2.75\").    Weight as of this encounter: 76.2 kg (168 lb).    Weight management plan: Discussed healthy diet and exercise guidelines    She reports that she has never smoked. She has never used smokeless tobacco.    Appropriate preventive services were discussed with this patient, including applicable screening as appropriate for cardiovascular disease, diabetes, osteopenia/osteoporosis, and glaucoma.  As appropriate for age/gender, discussed screening for colorectal cancer, prostate cancer, breast cancer, and cervical cancer. Checklist reviewing preventive services available has been given to the patient.    Reviewed patients plan of care and provided an AVS. The Intermediate Care Plan ( asthma action plan, low back pain action plan, and migraine action plan) for Tanya meets the Care Plan requirement. This Care Plan has been established and reviewed with the Patient.    Counseling Resources:  ATP IV Guidelines  Pooled Cohorts Equation Calculator  Breast Cancer Risk Calculator  BRCA-Related Cancer Risk Assessment: FHS-7 Tool  FRAX Risk Assessment  ICSI Preventive Guidelines  Dietary Guidelines for Americans, 2010  USDA's MyPlate  ASA Prophylaxis  Lung CA Screening    Miriam Bird MD  [facility]"

## 2020-11-17 NOTE — PATIENT INSTRUCTIONS
Patient Education   Personalized Prevention Plan  You are due for the preventive services outlined below.  Your care team is available to assist you in scheduling these services.  If you have already completed any of these items, please share that information with your care team to update in your medical record.  Health Maintenance Due   Topic Date Due     ANNUAL REVIEW OF HM ORDERS  01/12/1931     Zoster (Shingles) Vaccine (1 of 2) 01/12/1981     Mammogram  10/01/2019     Flu Vaccine (1) 09/01/2020     Osteoporosis Screening  09/14/2020

## 2020-11-17 NOTE — LETTER
November 18, 2020      Tanya Graham  [address]  [place] [address]        Dear ,    We are writing to inform you of your test results.    Hi Tanya   Your labs are back and they are looking good.   You have a normal electrolyte panel. The sodium, potassium, sugar, liver and kidneys are all normal. Your cholesterol tests are normal.   Your cbc is normal, showing no anemia or signs of infection.   Please call the clinic for more information if you have any questions.     [facility] MD     Resulted Orders   Comprehensive metabolic panel   Result Value Ref Range    Sodium 139 133 - 144 mmol/L    Potassium 4.4 3.4 - 5.3 mmol/L    Chloride 104 94 - 109 mmol/L    Carbon Dioxide 32 20 - 32 mmol/L    Anion Gap 3 3 - 14 mmol/L    Glucose 99 70 - 99 mg/dL      Comment:      Fasting specimen    Urea Nitrogen 23 7 - 30 mg/dL    Creatinine 0.84 0.52 - 1.04 mg/dL    GFR Estimate 61 >60 mL/min/[1.73_m2]      Comment:      Non  GFR Calc  Starting 12/18/2018, serum creatinine based estimated GFR (eGFR) will be   calculated using the Chronic Kidney Disease Epidemiology Collaboration   (CKD-EPI) equation.      GFR Estimate If Black 71 >60 mL/min/[1.73_m2]      Comment:       GFR Calc  Starting 12/18/2018, serum creatinine based estimated GFR (eGFR) will be   calculated using the Chronic Kidney Disease Epidemiology Collaboration   (CKD-EPI) equation.      Calcium 9.3 8.5 - 10.1 mg/dL    Bilirubin Total 0.7 0.2 - 1.3 mg/dL    Albumin 3.5 3.4 - 5.0 g/dL    Protein Total 7.7 6.8 - 8.8 g/dL    Alkaline Phosphatase 110 40 - 150 U/L    ALT 20 0 - 50 U/L    AST 28 0 - 45 U/L   Lipid panel reflex to direct LDL Fasting   Result Value Ref Range    Cholesterol 178 <200 mg/dL    Triglycerides 115 <150 mg/dL      Comment:      Fasting specimen    HDL Cholesterol 48 (L) >49 mg/dL    LDL Cholesterol Calculated 107 (H) <100 mg/dL      Comment:      Above desirable:  100-129  mg/dl  Borderline High:  130-159 mg/dL  High:             160-189 mg/dL  Very high:       >189 mg/dl      Non HDL Cholesterol 130 (H) <130 mg/dL      Comment:      Above Desirable:  130-159 mg/dl  Borderline high:  160-189 mg/dl  High:             190-219 mg/dl  Very high:       >219 mg/dl     CBC with platelets   Result Value Ref Range    WBC 5.7 4.0 - 11.0 10e9/L    RBC Count 4.81 3.8 - 5.2 10e12/L    Hemoglobin 14.8 11.7 - 15.7 g/dL    Hematocrit 45.1 35.0 - 47.0 %    MCV 94 78 - 100 fl    MCH 30.8 26.5 - 33.0 pg    MCHC 32.8 31.5 - 36.5 g/dL    RDW 14.3 10.0 - 15.0 %    Platelet Count 175 150 - 450 10e9/L       If you have any questions or concerns, please call the clinic at the number listed above.       Sincerely,        Miriam Bird MD/[facility]

## 2020-11-18 LAB
ALBUMIN SERPL-MCNC: 3.5 G/DL (ref 3.4–5)
ALP SERPL-CCNC: 110 U/L (ref 40–150)
ALT SERPL W P-5'-P-CCNC: 20 U/L (ref 0–50)
ANION GAP SERPL CALCULATED.3IONS-SCNC: 3 MMOL/L (ref 3–14)
AST SERPL W P-5'-P-CCNC: 28 U/L (ref 0–45)
BILIRUB SERPL-MCNC: 0.7 MG/DL (ref 0.2–1.3)
BUN SERPL-MCNC: 23 MG/DL (ref 7–30)
CALCIUM SERPL-MCNC: 9.3 MG/DL (ref 8.5–10.1)
CHLORIDE SERPL-SCNC: 104 MMOL/L (ref 94–109)
CHOLEST SERPL-MCNC: 178 MG/DL
CO2 SERPL-SCNC: 32 MMOL/L (ref 20–32)
CREAT SERPL-MCNC: 0.84 MG/DL (ref 0.52–1.04)
GFR SERPL CREATININE-BSD FRML MDRD: 61 ML/MIN/{1.73_M2}
GLUCOSE SERPL-MCNC: 99 MG/DL (ref 70–99)
HDLC SERPL-MCNC: 48 MG/DL
LDLC SERPL CALC-MCNC: 107 MG/DL
NONHDLC SERPL-MCNC: 130 MG/DL
POTASSIUM SERPL-SCNC: 4.4 MMOL/L (ref 3.4–5.3)
PROT SERPL-MCNC: 7.7 G/DL (ref 6.8–8.8)
SODIUM SERPL-SCNC: 139 MMOL/L (ref 133–144)
TRIGL SERPL-MCNC: 115 MG/DL

## 2020-12-16 ENCOUNTER — ANCILLARY PROCEDURE (OUTPATIENT)
Dept: GENERAL RADIOLOGY | Facility: CLINIC | Age: 85
End: 2020-12-16
Attending: PHYSICIAN ASSISTANT
Payer: MEDICARE

## 2020-12-16 ENCOUNTER — TELEPHONE (OUTPATIENT)
Dept: FAMILY MEDICINE | Facility: CLINIC | Age: 85
End: 2020-12-16

## 2020-12-16 ENCOUNTER — OFFICE VISIT (OUTPATIENT)
Dept: FAMILY MEDICINE | Facility: CLINIC | Age: 85
End: 2020-12-16
Payer: MEDICARE

## 2020-12-16 VITALS
SYSTOLIC BLOOD PRESSURE: 130 MMHG | TEMPERATURE: 98 F | WEIGHT: 170.2 LBS | DIASTOLIC BLOOD PRESSURE: 72 MMHG | RESPIRATION RATE: 16 BRPM | BODY MASS INDEX: 30.39 KG/M2 | HEART RATE: 57 BPM | OXYGEN SATURATION: 93 %

## 2020-12-16 DIAGNOSIS — R06.89 HEAVY BREATHING: Primary | ICD-10-CM

## 2020-12-16 PROCEDURE — 93000 ELECTROCARDIOGRAM COMPLETE: CPT | Performed by: PHYSICIAN ASSISTANT

## 2020-12-16 PROCEDURE — 71046 X-RAY EXAM CHEST 2 VIEWS: CPT | Performed by: RADIOLOGY

## 2020-12-16 PROCEDURE — 99214 OFFICE O/P EST MOD 30 MIN: CPT | Performed by: PHYSICIAN ASSISTANT

## 2020-12-16 NOTE — TELEPHONE ENCOUNTER
"S-(situation): BP high this am    B-(background): Takes all her bp meds at night.  Pt checked her bp this am and it was 155 systolic.    A-(assessment): Denies sob cough or fever.  States last night she did not \"feel right\" in her chest unable to elaborate, states maybe anxiety.  Denied chest pressure, pain, or palpitations.  Denies numbness/pain in extremities or neck/jaw/shoulders.      Pt states she wants to come in for a provider to check her heart and lungs to make sure she is ok and to go over meds for possible change in dosage.     R-(recommendations): Keep appt to be evaluated.  Marsha BALLESTEROS RN, BSN, PAL (Patient Advocate Liaison)  [facility]   [phone]      "

## 2020-12-16 NOTE — PROGRESS NOTES
Subjective     Tanya Graham is a 89 year old female who presents to clinic today for the following health issues:    HPI         Hypertension Follow-up      Do you check your blood pressure regularly outside of the clinic? Yes     Are you following a low salt diet? Yes    Are your blood pressures ever more than 140 on the top number (systolic) OR more   than 90 on the bottom number (diastolic), for example 140/90? Yes      How many servings of fruits and vegetables do you eat daily?  2-3    On average, how many sweetened beverages do you drink each day (Examples: soda, juice, sweet tea, etc.  Do NOT count diet or artificially sweetened beverages)?   0    How many days per week do you exercise enough to make your heart beat faster? 3 or less    How many minutes a day do you exercise enough to make your heart beat faster? 9 or less    How many days per week do you miss taking your medication? 0    Concern -Funny breathing   Onset: Just last night   Description: Patient states that when she was laying down and sleeping, she felt it is hard to take a breath and feels like she needs to take more breaths. She denies any chest pain now or then.    Review of Systems   GENERAL:  No fevers  RESP:  As noted in HPI  CARDIAC: No chest pain        Objective    /72 (BP Location: Right arm, Patient Position: Sitting, Cuff Size: Adult Regular)   Pulse 57   Temp 98  F (36.7  C) (Oral)   Resp 16   Wt 77.2 kg (170 lb 3.2 oz)   LMP 06/02/1981   SpO2 93%   Breastfeeding No   BMI 30.39 kg/m    Body mass index is 30.39 kg/m .  Physical Exam   GENERAL: No acute distress  HEENT: Normocephalic  CARDIAC: Regular rate and rhythm. No murmurs.  PULMONARY: Lungs are clear to auscultation bilaterally. No wheezes, rhonchi or crackles.  EXTREMITIES: Trace edema bilateral lower legs.  NEURO: Alert and non-focal    CXR - No infiltrates or effusions. Cardiomegaly unchanged from previous. Per my read.  EKG - Atrial flutter. Rate  "controlled        Assessment & Plan     Heavy breathing  EKG is stable (atrial flutter, rate controlled). Chest x-ray is similar to previous x-ray per my read.   Unclear etiology for patient's episode of \"heavy breathing\" last night. ECHO ordered for further evaluation.  Follow up in the mean time if worsening.  Patient had recent labs (CBC, CMP and lipid) with PCP, normal.   Follow up in 5 months time with PCP for normal chronic medication/medical problems recheck.  - XR Chest 2 Views  - EKG 12-lead complete w/read - Clinics  - Echocardiogram Complete; Future        Return if symptoms worsen or fail to improve.    Griselda Greer PA-C  [facility]    "

## 2020-12-16 NOTE — LETTER
December 17, 2020      Tanya ARYA Graham  [address]  [place] [address]        Dear ,    We are writing to inform you of your test results. Your results are all within normal including     Chest xray.       Resulted Orders   XR Chest 2 Views    Narrative    CHEST TWO VIEWS December 16, 2020 10:35 AM     HISTORY: Heavy breathing.    COMPARISON: 6/25/2015.      Impression    IMPRESSION: No focal airspace disease. Stable cardiomegaly. No  effusions identified.    VOLODYMYR PAULA MD       If you have any questions or concerns, please call the clinic at the number listed above.       Sincerely,      Griselda Greer PA-C

## 2020-12-28 ENCOUNTER — HOSPITAL ENCOUNTER (OUTPATIENT)
Dept: CARDIOLOGY | Facility: CLINIC | Age: 85
Discharge: HOME | End: 2020-12-28
Attending: PHYSICIAN ASSISTANT | Admitting: PHYSICIAN ASSISTANT
Payer: MEDICARE

## 2020-12-28 ENCOUNTER — TELEPHONE (OUTPATIENT)
Dept: FAMILY MEDICINE | Facility: CLINIC | Age: 85
End: 2020-12-28

## 2020-12-28 DIAGNOSIS — R06.89 HEAVY BREATHING: Primary | ICD-10-CM

## 2020-12-28 DIAGNOSIS — I27.20 PULMONARY HYPERTENSION (H): ICD-10-CM

## 2020-12-28 DIAGNOSIS — R06.89 HEAVY BREATHING: ICD-10-CM

## 2020-12-28 DIAGNOSIS — I36.1 NONRHEUMATIC TRICUSPID VALVE REGURGITATION: ICD-10-CM

## 2020-12-28 PROCEDURE — 93306 TTE W/DOPPLER COMPLETE: CPT

## 2020-12-28 PROCEDURE — 93306 TTE W/DOPPLER COMPLETE: CPT | Mod: 26 | Performed by: INTERNAL MEDICINE

## 2020-12-28 NOTE — TELEPHONE ENCOUNTER
Please call patient to let her know that the ECHO shows some mild regurgitation of the mitral and aortic valves with moderate to severe regurgitation of the tricuspid valve. There are signs of pulmonary hypertension (increased pressure in the vessels of the lungs). This could be why she is having the symptoms she is having. I would like her to see cardiology to discuss treatment options.

## 2020-12-29 NOTE — TELEPHONE ENCOUNTER
Spoke to pt and made an appt for pt to see cardiology  Jan 4, 2021 0815 [facility]  Dr Kathe Gardner 2nd floor [address] which is right [address]. Dr Gardner normally is out of [place].    Pt was not too happy about the appt location but the next appt was not until Jan 18th in [place].  Gave pt sched number to change if necessary.    Marsha BALLESTEROS RN, BSN, PAL (Patient Advocate Liaison)  [facility]   [phone]

## 2020-12-30 ENCOUNTER — TELEPHONE (OUTPATIENT)
Dept: FAMILY MEDICINE | Facility: CLINIC | Age: 85
End: 2020-12-30

## 2020-12-30 NOTE — TELEPHONE ENCOUNTER
Pt wanted to wait to go to cardiology until she could get in in [place].  Rescheduled now for Jan 14th at 1030  Specialty care center  [id number]  [address] [facility] [address]  Marsha BALLESTEROS RN, BSN, PAL (Patient Advocate Liaison)  [facility]   [phone]

## 2021-01-01 NOTE — TELEPHONE ENCOUNTER
I checked with Neurology and they report that they did not receive the order that was placed for Elma today or on 6/7/20.  They asked that it be faxed to [phone] and once they have the order they will contact the patient to schedule an appointment.      Also, please place a new order to [facility] for both PHYSICAL THERAPY and OCCUPATIONAL THERAPY.  The one that was done today went to rehab rather than Home Care.      Thank you!    JULIANA Blancas   Care Coordination Team  [phone]     Term birth of male

## 2021-01-08 ENCOUNTER — TELEPHONE (OUTPATIENT)
Dept: FAMILY MEDICINE | Facility: CLINIC | Age: 86
End: 2021-01-08

## 2021-01-08 DIAGNOSIS — I48.92 ATRIAL FLUTTER, UNSPECIFIED TYPE (H): ICD-10-CM

## 2021-01-08 NOTE — TELEPHONE ENCOUNTER
Spoke with [facility], This was an auto request.    They will inform patient of current prescription at [facility].    Mely Katz RN

## 2021-01-08 NOTE — TELEPHONE ENCOUNTER
Reason for call:  Medication refill  Patient called regarding (reason for call): call back  Additional comments: Kelli from [facility] pharmacy requesting a refill (eliquis 5 mg).    Phone number to reach patient:  Other phone number:  [phone]    Best Time:  Open until 6:30pm    Can we leave a detailed message on this number?  YES    Travel screening: Not Applicable

## 2021-01-14 ENCOUNTER — OFFICE VISIT (OUTPATIENT)
Dept: CARDIOLOGY | Facility: CLINIC | Age: 86
End: 2021-01-14
Attending: PHYSICIAN ASSISTANT
Payer: COMMERCIAL

## 2021-01-14 VITALS
SYSTOLIC BLOOD PRESSURE: 128 MMHG | BODY MASS INDEX: 29.77 KG/M2 | HEIGHT: 63 IN | WEIGHT: 168 LBS | DIASTOLIC BLOOD PRESSURE: 54 MMHG

## 2021-01-14 DIAGNOSIS — I27.20 PULMONARY HYPERTENSION (H): Primary | ICD-10-CM

## 2021-01-14 DIAGNOSIS — Z85.3 PERSONAL HISTORY OF MALIGNANT NEOPLASM OF BREAST: ICD-10-CM

## 2021-01-14 DIAGNOSIS — I10 ESSENTIAL HYPERTENSION, BENIGN: ICD-10-CM

## 2021-01-14 DIAGNOSIS — I36.1 NONRHEUMATIC TRICUSPID VALVE REGURGITATION: ICD-10-CM

## 2021-01-14 DIAGNOSIS — I48.3 TYPICAL ATRIAL FLUTTER (H): ICD-10-CM

## 2021-01-14 PROBLEM — I48.92 ATRIAL FLUTTER, UNSPECIFIED TYPE (H): Status: RESOLVED | Noted: 2020-06-09 | Resolved: 2021-01-14

## 2021-01-14 PROCEDURE — 99204 OFFICE O/P NEW MOD 45 MIN: CPT | Performed by: INTERNAL MEDICINE

## 2021-01-14 ASSESSMENT — MIFFLIN-ST. JEOR: SCORE: 1147.2

## 2021-01-14 NOTE — PROGRESS NOTES
Clinic visit note dictated. Dictation reference number - [id number]        REVIEW OF SYSTEMS:  A comprehensive 10-point review of systems was completed and the pertinent positives are documented in the history of present illness.    Skin:  Positive for     Eyes:  Positive for glasses  ENT:  Negative    Respiratory:  Negative    Cardiovascular:    edema;Positive for  Gastroenterology: Negative    Genitourinary:  Negative    Musculoskeletal:  Negative    Neurologic:  Negative    Psychiatric:  Positive for excessive stress;anxiety;depression  Heme/Lymph/Imm:  Positive for easy bruising  Endocrine:  Negative      CURRENT MEDICATIONS:  Current Outpatient Medications   Medication Sig Dispense Refill     apixaban ANTICOAGULANT (ELIQUIS) 5 MG tablet Take 1 tablet (5 mg) by mouth 2 times daily 180 tablet 1     atenolol (TENORMIN) 50 MG tablet TAKE 1 TABLET BY MOUTH EVERY DAY 90 tablet 1     cholecalciferol (VITAMIN-D) 1000 UNIT capsule Take 1 capsule by mouth daily. Patient takes (2) capsules daily.       hydrochlorothiazide (HYDRODIURIL) 25 MG tablet Take 1 tablet (25 mg) by mouth daily 90 tablet 1     lisinopril (ZESTRIL) 10 MG tablet TAKE 1 TABLET BY MOUTH EVERY DAY 90 tablet 1     Multiple Vitamins-Minerals (OCUVITE-LUTEIN PO) Take  by mouth. Patient takes 2 tabs daily.       Niacinamide 500 MG TBCR Take 1 tablet by mouth daily To prevent skin cancer, per dermatologist 90 tablet 3     ondansetron (ZOFRAN ODT) 4 MG ODT tab Take 1 tablet (4 mg) by mouth every 6 hours as needed 20 tablet 0     simvastatin (ZOCOR) 10 MG tablet Take 1 tablet (10 mg) by mouth At Bedtime 90 tablet 3     Calcium-Vitamin D-Vitamin K 500-100-40 MG-UNT-MCG CHEW            ALLERGIES:  Allergies   Allergen Reactions     No Clinical Screening - See Comments      Vomits from IV narcotics       PAST MEDICAL HISTORY:    Past Medical History:   Diagnosis Date     Basal cell carcinoma      Benign neoplasm of colon 4/03, 8/07    polyps, 2 and 3      Diverticulosis of colon (without mention of hemorrhage) 8/07    noted on screening colonoscopy     Essential hypertension, benign     Hypertension, Benign     Malignant neoplasm of breast (female), unspecified site 3/07    infiltrating ductal CA left breast     Mixed hyperlipidemia     Hyperlipidemia     NONSPECIFIC MEDICAL HISTORY 1971    left carotid aneurysm, surgical repair     NONSPECIFIC MEDICAL HISTORY 12/04, 2/07    osteopenia '04, nl DEXA '07     NONSPECIFIC MEDICAL HISTORY 4/06    ventricular ectopy; nl stress echo     Other malignant neoplasm of other specified sites of skin     skin cancer removed     Other ventral hernia without mention of obstruction or gangrene     surgical repair     Postmastectomy lymphedema syndrome 2007     Unspecified intestinal obstruction 7/07    partial small bowel obstruction into ventral hernia       PAST SURGICAL HISTORY:    Past Surgical History:   Procedure Laterality Date     BIOPSY  12/16/2020    On left side of head      BIOPSY OF BREAST, NEEDLE CORE  3/07    left breast, infiltrating ductal CA     BREAST LUMPECTOMY, RT/LT  4/07    left breast with sentienl node bx     HYSTERECTOMY, RICH      RICH/BSO-due to prolapsed bladder?     LAP VENTRAL HERNIA REPAIR  5/07    ventral herniorrhaphy     SURGICAL HISTORY OF -   1971    left carotid aneurysm     SURGICAL HISTORY OF -   2006    bilat cataract     SURGICAL HISTORY OF -   2000    bladder repair     SURGICAL HISTORY OF -   2002    ventral hernia repair       FAMILY HISTORY:    Family History   Problem Relation Age of Onset     C.A.D. Mother      Cancer Father         unknown primary cancer     Cancer Brother         prostate     Diabetes Daughter      Cardiovascular Daughter         MI age 37,hx heart transplant     Gastrointestinal Disease Daughter         celiac sprue     Connective Tissue Disorder Daughter         fibromyalgia       SOCIAL HISTORY:    Social History     Socioeconomic History     Marital status:   "    Spouse name: Hollis     Number of children: 2     Years of education: None     Highest education level: None   Occupational History     Employer: RETIRED   Social Needs     Financial resource strain: Not hard at all     Food insecurity     Worry: Never true     Inability: Never true     Transportation needs     Medical: No     Non-medical: No   Tobacco Use     Smoking status: Never Smoker     Smokeless tobacco: Never Used   Substance and Sexual Activity     Alcohol use: No     Drug use: No     Sexual activity: Yes     Partners: Male     Comment: postmenopausal   Lifestyle     Physical activity     Days per week: None     Minutes per session: None     Stress: Not at all   Relationships     Social connections     Talks on phone: More than three times a week     Gets together: None     Attends Worship service: Never     Active member of club or organization: None     Attends meetings of clubs or organizations: None     Relationship status: None     Intimate partner violence     Fear of current or ex partner: No     Emotionally abused: No     Physically abused: No     Forced sexual activity: No   Other Topics Concern     Parent/sibling w/ CABG, MI or angioplasty before 65F 55M? Not Asked   Social History Narrative     None       PHYSICAL EXAM:    Vitals: /54   Pulse (P) 70   Ht 1.594 m (5' 2.75\")   Wt 76.2 kg (168 lb)   LMP 06/02/1981   SpO2 (P) 94%   BMI 30.00 kg/m    Wt Readings from Last 5 Encounters:   01/14/21 76.2 kg (168 lb)   12/16/20 77.2 kg (170 lb 3.2 oz)   11/17/20 76.2 kg (168 lb)   08/13/20 79.8 kg (176 lb)   06/17/20 78.5 kg (173 lb)                 Encounter Diagnoses   Name Primary?     Pulmonary hypertension (H) Yes     Nonrheumatic tricuspid valve regurgitation      Typical atrial flutter (H)      Essential hypertension, benign      Personal history of malignant neoplasm of breast        Orders Placed This Encounter   Procedures     Follow-Up with Pulmonary Hypertension Clinic "

## 2021-01-14 NOTE — LETTER
1/14/2021    Miriam Bird [place]  [address] [facility] [address]    RE: Tanya Graham       Dear Colleague,    I had the pleasure of seeing Tanya Graham in the [facility].    Clinic visit note dictated. Dictation reference number - [id number]        REVIEW OF SYSTEMS:  A comprehensive 10-point review of systems was completed and the pertinent positives are documented in the history of present illness.    Skin:  Positive for     Eyes:  Positive for glasses  ENT:  Negative    Respiratory:  Negative    Cardiovascular:    edema;Positive for  Gastroenterology: Negative    Genitourinary:  Negative    Musculoskeletal:  Negative    Neurologic:  Negative    Psychiatric:  Positive for excessive stress;anxiety;depression  Heme/Lymph/Imm:  Positive for easy bruising  Endocrine:  Negative      CURRENT MEDICATIONS:  Current Outpatient Medications   Medication Sig Dispense Refill     apixaban ANTICOAGULANT (ELIQUIS) 5 MG tablet Take 1 tablet (5 mg) by mouth 2 times daily 180 tablet 1     atenolol (TENORMIN) 50 MG tablet TAKE 1 TABLET BY MOUTH EVERY DAY 90 tablet 1     cholecalciferol (VITAMIN-D) 1000 UNIT capsule Take 1 capsule by mouth daily. Patient takes (2) capsules daily.       hydrochlorothiazide (HYDRODIURIL) 25 MG tablet Take 1 tablet (25 mg) by mouth daily 90 tablet 1     lisinopril (ZESTRIL) 10 MG tablet TAKE 1 TABLET BY MOUTH EVERY DAY 90 tablet 1     Multiple Vitamins-Minerals (OCUVITE-LUTEIN PO) Take  by mouth. Patient takes 2 tabs daily.       Niacinamide 500 MG TBCR Take 1 tablet by mouth daily To prevent skin cancer, per dermatologist 90 tablet 3     ondansetron (ZOFRAN ODT) 4 MG ODT tab Take 1 tablet (4 mg) by mouth every 6 hours as needed 20 tablet 0     simvastatin (ZOCOR) 10 MG tablet Take 1 tablet (10 mg) by mouth At Bedtime 90 tablet 3     Calcium-Vitamin D-Vitamin K 500-100-40 MG-UNT-MCG CHEW            ALLERGIES:  Allergies   Allergen Reactions     No Clinical  Screening - See Comments      Vomits from IV narcotics       PAST MEDICAL HISTORY:    Past Medical History:   Diagnosis Date     Basal cell carcinoma      Benign neoplasm of colon 4/03, 8/07    polyps, 2 and 3     Diverticulosis of colon (without mention of hemorrhage) 8/07    noted on screening colonoscopy     Essential hypertension, benign     Hypertension, Benign     Malignant neoplasm of breast (female), unspecified site 3/07    infiltrating ductal CA left breast     Mixed hyperlipidemia     Hyperlipidemia     NONSPECIFIC MEDICAL HISTORY 1971    left carotid aneurysm, surgical repair     NONSPECIFIC MEDICAL HISTORY 12/04, 2/07    osteopenia '04, nl DEXA '07     NONSPECIFIC MEDICAL HISTORY 4/06    ventricular ectopy; nl stress echo     Other malignant neoplasm of other specified sites of skin     skin cancer removed     Other ventral hernia without mention of obstruction or gangrene     surgical repair     Postmastectomy lymphedema syndrome 2007     Unspecified intestinal obstruction 7/07    partial small bowel obstruction into ventral hernia       PAST SURGICAL HISTORY:    Past Surgical History:   Procedure Laterality Date     BIOPSY  12/16/2020    On left side of head      BIOPSY OF BREAST, NEEDLE CORE  3/07    left breast, infiltrating ductal CA     BREAST LUMPECTOMY, RT/LT  4/07    left breast with sentienl node bx     HYSTERECTOMY, RICH      RICH/BSO-due to prolapsed bladder?     LAP VENTRAL HERNIA REPAIR  5/07    ventral herniorrhaphy     SURGICAL HISTORY OF -   1971    left carotid aneurysm     SURGICAL HISTORY OF -   2006    bilat cataract     SURGICAL HISTORY OF -   2000    bladder repair     SURGICAL HISTORY OF -   2002    ventral hernia repair       FAMILY HISTORY:    Family History   Problem Relation Age of Onset     C.A.D. Mother      Cancer Father         unknown primary cancer     Cancer Brother         prostate     Diabetes Daughter      Cardiovascular Daughter         MI age 37,hx heart transplant  "    Gastrointestinal Disease Daughter         celiac sprue     Connective Tissue Disorder Daughter         fibromyalgia       SOCIAL HISTORY:    Social History     Socioeconomic History     Marital status:      Spouse name: Hollis     Number of children: 2     Years of education: None     Highest education level: None   Occupational History     Employer: RETIRED   Social Needs     Financial resource strain: Not hard at all     Food insecurity     Worry: Never true     Inability: Never true     Transportation needs     Medical: No     Non-medical: No   Tobacco Use     Smoking status: Never Smoker     Smokeless tobacco: Never Used   Substance and Sexual Activity     Alcohol use: No     Drug use: No     Sexual activity: Yes     Partners: Male     Comment: postmenopausal   Lifestyle     Physical activity     Days per week: None     Minutes per session: None     Stress: Not at all   Relationships     Social connections     Talks on phone: More than three times a week     Gets together: None     Attends Jain service: Never     Active member of club or organization: None     Attends meetings of clubs or organizations: None     Relationship status: None     Intimate partner violence     Fear of current or ex partner: No     Emotionally abused: No     Physically abused: No     Forced sexual activity: No   Other Topics Concern     Parent/sibling w/ CABG, MI or angioplasty before 65F 55M? Not Asked   Social History Narrative     None       PHYSICAL EXAM:    Vitals: /54   Pulse (P) 70   Ht 1.594 m (5' 2.75\")   Wt 76.2 kg (168 lb)   LMP 06/02/1981   SpO2 (P) 94%   BMI 30.00 kg/m    Wt Readings from Last 5 Encounters:   01/14/21 76.2 kg (168 lb)   12/16/20 77.2 kg (170 lb 3.2 oz)   11/17/20 76.2 kg (168 lb)   08/13/20 79.8 kg (176 lb)   06/17/20 78.5 kg (173 lb)                 Encounter Diagnoses   Name Primary?     Pulmonary hypertension (H) Yes     Nonrheumatic tricuspid valve regurgitation      " Typical atrial flutter (H)      Essential hypertension, benign      Personal history of malignant neoplasm of breast        Orders Placed This Encounter   Procedures     Follow-Up with Pulmonary Hypertension Clinic             Thank you for allowing me to participate in the care of your patient.      Sincerely,     Dia Vidal MD     [facility]    cc:   Griselda Greer PA-C  [id number] [phone]

## 2021-01-14 NOTE — PROGRESS NOTES
"Service Date: 01/14/2021      REFERRING PROVIDER:  Almita Greer PA-C      PRIMARY CARE PROVIDER:  Miriam Bird MD       REASON FOR VISIT:   1.  Pulmonary hypertension based on elevated RVSP on echocardiogram.   2.  Tricuspid valve regurgitation.      HISTORY OF PRESENT ILLNESS:    It was my absolute pleasure to visit with Tanya Graham (likes to be called Elma), who is new to Cardiology.  She is a simply delightful 89-year-old  lady who appears about 20 years younger than her stated age.  She lives on her own after being  for the last 10 years, has a small dog (Lithuanian), 2 adult children.  She is independent, self-caring, never tobacco user, BMI 30 kg/m2.      I am seeing her after her recent echocardiograms have demonstrated elevated RVSP and were reported as showing \"moderate pulmonary artery hypertension\" and moderate to moderately severe tricuspid valve regurgitation.      Elma's medical history is significant for:   1.  Well-controlled essential hypertension.  On a combination of atenolol, hydrochlorothiazide, lisinopril.   2.  Atrial flutter.  Rate controlled on atenolol and anticoagulated with apixaban.   3.  Previous history of left breast cancer, status post surgery.   4.  Remote history of carotid artery aneurysm surgical repair in 1971.   5.  Treated hyperlipidemia.      Back in 06/2020, Elma woke up one morning and felt lightheaded and dizzy, which prompted her to come to the emergency room.  She was noted to be hypertensive at 194/118 mmHg and ECG showed a new diagnosis of atrial flutter with variable block.  Her MRI brain with MRA was unremarkable.  Her left external carotid artery was occluded, but there was no problem with the internal carotids or vertebral artery.  She was appropriately started on apixaban for a CHADS-VASc score of 4.0 and because her atrial flutter was rate controlled, she remained on the atenolol.  She was discharged with a cardiology consultation.        She " saw a cardiologist at [place] from [facility].  I reviewed the notes and echocardiogram report in Care Everywhere.  Her echo showed normal LV size and function, EF 60%-65%, mildly dilated right ventricle with normal systolic function, moderate to severe tricuspid valve regurgitation, severe biatrial enlargement and RVSP of 43 mmHg consistent with moderate pulmonary hypertension.  The cardiologist recommended continuing upper rate control with atenolol and anticoagulation.      It would be fair to say that Elma has no significant symptoms.  She is independent, self-caring, has minimal lower extremity edema, no dyspnea, orthopnea, chest pain, palpitations, presyncope or syncope at her accustomed level of exercise.  Occasionally, when she wakes up in the morning, she is slightly dizzy, but this passes very quickly.  She is tolerating her medications well.  No bleeding issues with apixaban.      There is no history of DVT/PE, anorexiant or other pulmonary hypertension causing medication, chronic lung disease of connective tissue disease.      FAMILY HISTORY:  Pertinent for one of her daughters with type 1 diabetes, suffering a large myocardial infarction in her 30s and 3 months later, she underwent a successful cardiac transplantation and is still alive.  Her other daughter is well.  She has 3 grandchildren.        DIAGNOSTIC DATA:   Labs:  Total cholesterol 178, HDL 48, , triglycerides 115 (simvastatin 10 mg daily).  Normal renal function with a potassium of 4.4, BUN 23, creatinine 0.84, estimated GFR 61.  Normal CBC with a hemoglobin of 14.8, normal TSH, normal liver enzymes.       ECG done today shows typical atrial flutter at 60 BPM.     Transthoracic echocardiogram 12/28/2020.  I personally reviewed the images.  LVEF 65%, normal size, normal RV size and function, severe biatrial enlargement (the atria appear larger than the ventricles), mild to moderate mitral valve regurgitation, moderate tricuspid  valve regurgitation, normal IVC, estimated RVSP is 40 mmHg plus right atrial pressure.  Note that at the time this study was done, the patient's blood pressure was 175/74 mmHg.      PHYSICAL EXAMINATION:     VITAL SIGNS:  BP is 128/54, pulse 70 per minute, height 5 feet 3 inches, weight 168 pounds, BMI of 30.   GENERAL:  Appears younger than stated age.  No pallor, icterus, cyanosis.   CARDIOVASCULAR:  Normal JVP, undisplaced apical impulse, regular heart sounds, barely audible systolic murmur.  No S3.   LUNGS:  Clear to auscultation.   MUSCULOSKELETAL:  Mild kyphosis and changes of osteoarthritis of hands and feet.   NEUROPSYCHIATRIC:  Normal.   EXTREMITIES:  Trace edema of both lower extremities.  She has skin changes suggestive of chronic venostasis up to her shins.      DIAGNOSES:   1.  Pulmonary hypertension, likely group 2, based on elevated pressures on echocardiogram.  Invasive testing not indicated.   2.  Atrial flutter, rate control and anticoagulation strategy.   3.  Benign essential hypertension, well controlled.   4.  History of breast cancer.      ASSESSMENT AND PLAN:    Elma is remarkably asymptomatic for a 89-year-old lady, she is self-caring, independent and has no significant symptoms.  Given the degree of severe biatrial enlargement, I expect that she has had atrial flutter for many years, although it was only diagnosed 9 months ago.  The atrial flutter itself is not causing her symptoms and she is appropriately rate controlled and anticoagulated.  At her age, group 1 pulmonary arterial hypertension is extremely rare.  Additionally, her right ventricular systolic function is normal.  She does have moderate tricuspid valve regurgitation and I think this is functional from the severe biatrial enlargement.  At the time that the echocardiogram was performed, her systolic blood pressure was in the 170s, which can also cause the estimated RVSP to be higher.      In summary, I am advocating for  conservative therapy in an elderly patient with no symptoms.     1.  No changes to medications.   2.  I will see her back in 6 months.  No additional labs or echo at that point.  We will consider echocardiography once a year.         AMANDA OLSON MD             D: 2021   T: 2021   MT: TAI      Name:     SANIA SANTANA   MRN:      [id number]        Account:      [id number]   :      1931           Service Date: 2021      Document: [id number]

## 2021-01-14 NOTE — PATIENT INSTRUCTIONS
1. No changes to medications.    2. Follow up with Dr. Vidal in 6 months. No labs or testing if stable.    If you have any questions or concerns, please contact my nurses at [phone].

## 2021-01-14 NOTE — LETTER
"1/14/2021      Miriam Bird MD  [id number]  [facility] [address]      RE: Tanya Graham       Dear Colleague,    I had the pleasure of seeing Tanya Graham in the [facility].    Service Date: 01/14/2021      REFERRING PROVIDER:  Almita Greer PA-C      PRIMARY CARE PROVIDER:  Miriam Bird MD       REASON FOR VISIT:   1.  Pulmonary hypertension based on elevated RVSP on echocardiogram.   2.  Tricuspid valve regurgitation.      HISTORY OF PRESENT ILLNESS:  It was my absolute pleasure to visit with Tanya Graham (likes to be called Elma), who is new to Cardiology.  She is a simply delightful 89-year-old  lady who appears about 20 years younger than her stated age.  She lives on her own after being  for the last 10 years, has a small dog (Nigerian), 2 adult children.  She is independent, self-caring, never tobacco user, BMI 30 kg/m2.      I am seeing her after her recent echocardiograms have demonstrated elevated RVSP and were reported as showing \"moderate pulmonary artery hypertension\" and moderate to moderately severe tricuspid valve regurgitation.      Elma's medical history is significant for:   1.  Well-controlled essential hypertension.  On a combination of atenolol, hydrochlorothiazide, lisinopril.   2.  Atrial flutter.  Rate controlled on atenolol and anticoagulated with apixaban.   3.  Previous history of left breast cancer, status post surgery.   4.  Remote history of carotid artery aneurysm surgical repair in 1971.   5.  Treated hyperlipidemia.      Back in 06/2020, Elma woke up one morning and felt lightheaded and dizzy, which prompted her to come to the emergency room.  She was noted to be hypertensive at 194/118 mmHg and ECG showed a new diagnosis of atrial flutter with variable block.  Her MRI brain with MRA was unremarkable.  Her left external carotid artery was occluded, but there was no problem with the internal carotids or vertebral " artery.  She was appropriately started on apixaban for a CHADS-VASc score of 4.0 and because her atrial flutter was rate controlled, she remained on the atenolol.  She was discharged with a cardiology consultation.        She saw a cardiologist at [place] from [facility].  I reviewed the notes and echocardiogram report in Care Everywhere.  Her echo showed normal LV size and function, EF 60%-65%, mildly dilated right ventricle with normal systolic function, moderate to severe tricuspid valve regurgitation, severe biatrial enlargement and RVSP of 43 mmHg consistent with moderate pulmonary hypertension.  The cardiologist recommended continuing upper rate control with atenolol and anticoagulation.      It would be fair to say that Elma has no significant symptoms.  She is independent, self-caring, has minimal lower extremity edema, no dyspnea, orthopnea, chest pain, palpitations, presyncope or syncope at her accustomed level of exercise.  Occasionally, when she wakes up in the morning, she is slightly dizzy, but this passes very quickly.  She is tolerating her medications well.  No bleeding issues with apixaban.      There is no history of DVT/PE, anorexiant or other pulmonary hypertension causing medication, chronic lung disease of connective tissue disease.      FAMILY HISTORY:  Pertinent for one of her daughters with type 1 diabetes, suffering a large myocardial infarction in her 30s and 3 months later, she underwent a successful cardiac transplantation and is still alive.  Her other daughter is well.  She has 3 grandchildren.        DIAGNOSTIC DATA:   Labs:  Total cholesterol 178, HDL 48, , triglycerides 115 (simvastatin 10 mg daily).  Normal renal function with a potassium of 4.4, BUN 23, creatinine 0.84, estimated GFR 61.  Normal CBC with a hemoglobin of 14.8, normal TSH, normal liver enzymes.     ECG done today shows typical atrial flutter at 60 BPM.   Transthoracic echocardiogram 12/28/2020.  I  personally reviewed the images.  LVEF 65%, normal size, normal RV size and function, severe biatrial enlargement (the atria appear larger than the ventricles), mild to moderate mitral valve regurgitation, moderate tricuspid valve regurgitation, normal IVC, estimated RVSP is 40 mmHg plus right atrial pressure.  Note that at the time this study was done, the patient's blood pressure was 175/74 mmHg.      PHYSICAL EXAMINATION:     VITAL SIGNS:  BP is 128/54, pulse 70 per minute, height 5 feet 3 inches, weight 168 pounds, BMI of 30.   GENERAL:  Appears younger than stated age.  No pallor, icterus, cyanosis.   CARDIOVASCULAR:  Normal JVP, undisplaced apical impulse, regular heart sounds, barely audible systolic murmur.  No S3.   LUNGS:  Clear to auscultation.   MUSCULOSKELETAL:  Mild kyphosis and changes of osteoarthritis of hands and feet.   NEUROPSYCHIATRIC:  Normal.   EXTREMITIES:  Trace edema of both lower extremities.  She has skin changes suggestive of chronic venostasis up to her shins.      DIAGNOSES:   1.  Pulmonary hypertension, likely group 2, based on elevated pressures on echocardiogram.  Invasive testing not indicated.   2.  Atrial flutter, rate control and anticoagulation strategy.   3.  Benign essential hypertension, well controlled.   4.  History of breast cancer.      ASSESSMENT AND PLAN:  Elma is remarkably asymptomatic for a 89-year-old lady, she is self-caring, independent and has no significant symptoms.  Given the degree of severe biatrial enlargement, I expect that she has had atrial flutter for many years, although it was only diagnosed 9 months ago.  The atrial flutter itself is not causing her symptoms and she is appropriately rate controlled and anticoagulated.  At her age, group 1 pulmonary arterial hypertension is extremely rare.  Additionally, her right ventricular systolic function is normal.  She does have moderate tricuspid valve regurgitation and I think this is functional from the  severe biatrial enlargement.  Additionally, at the time that the echocardiogram was performed, her systolic blood pressure was in the 170s, which can also cause the estimated RVSP to be higher.      In summary, I am advocating for conservative therapy in an elderly patient with no symptoms.   1.  No changes to medications.   2.  I will see her back in 6 months.  No additional labs or echo at that point.  We will consider echocardiography once a year.         DIA OLSON MD             D: 2021   T: 2021   MT: TAI      Name:     SANIA SANTANA   MRN:      [id number]        Account:      [id number]   :      1931           Service Date: 2021      Document: [id number]          Outpatient Encounter Medications as of 2021   Medication Sig Dispense Refill     apixaban ANTICOAGULANT (ELIQUIS) 5 MG tablet Take 1 tablet (5 mg) by mouth 2 times daily 180 tablet 1     atenolol (TENORMIN) 50 MG tablet TAKE 1 TABLET BY MOUTH EVERY DAY 90 tablet 1     cholecalciferol (VITAMIN-D) 1000 UNIT capsule Take 1 capsule by mouth daily. Patient takes (2) capsules daily.       hydrochlorothiazide (HYDRODIURIL) 25 MG tablet Take 1 tablet (25 mg) by mouth daily 90 tablet 1     lisinopril (ZESTRIL) 10 MG tablet TAKE 1 TABLET BY MOUTH EVERY DAY 90 tablet 1     Multiple Vitamins-Minerals (OCUVITE-LUTEIN PO) Take  by mouth. Patient takes 2 tabs daily.       Niacinamide 500 MG TBCR Take 1 tablet by mouth daily To prevent skin cancer, per dermatologist 90 tablet 3     ondansetron (ZOFRAN ODT) 4 MG ODT tab Take 1 tablet (4 mg) by mouth every 6 hours as needed 20 tablet 0     simvastatin (ZOCOR) 10 MG tablet Take 1 tablet (10 mg) by mouth At Bedtime 90 tablet 3     Calcium-Vitamin D-Vitamin K 500-100-40 MG-UNT-MCG CHEW        No facility-administered encounter medications on file as of 2021.        Again, thank you for allowing me to participate in the care of your patient.      Sincerely,    Dia Archuleta  MD Young     [facility]    cc:   Griselda Greer PA-C  [address] [phone]

## 2021-02-17 ENCOUNTER — IMMUNIZATION (OUTPATIENT)
Dept: NURSING | Facility: CLINIC | Age: 86
End: 2021-02-17
Payer: COMMERCIAL

## 2021-02-17 PROCEDURE — 91300 PR COVID VAC PFIZER DIL RECON 30 MCG/0.3 ML IM: CPT

## 2021-02-17 PROCEDURE — 0001A PR COVID VAC PFIZER DIL RECON 30 MCG/0.3 ML IM: CPT

## 2021-02-23 DIAGNOSIS — I10 ESSENTIAL HYPERTENSION, BENIGN: ICD-10-CM

## 2021-02-25 RX ORDER — ATENOLOL 50 MG/1
50 TABLET ORAL DAILY
Qty: 90 TABLET | Refills: 0
Start: 2021-02-25

## 2021-02-25 RX ORDER — LISINOPRIL 10 MG/1
10 TABLET ORAL DAILY
Qty: 90 TABLET | Refills: 0 | Status: SHIPPED | OUTPATIENT
Start: 2021-02-25 | End: 2021-04-29

## 2021-02-25 NOTE — TELEPHONE ENCOUNTER
Prescription lisinopril approved per [facility] Refill Protocol.    Mely Katz RN

## 2021-03-10 ENCOUNTER — IMMUNIZATION (OUTPATIENT)
Dept: NURSING | Facility: CLINIC | Age: 86
End: 2021-03-10
Attending: INTERNAL MEDICINE
Payer: COMMERCIAL

## 2021-03-10 PROCEDURE — 91300 PR COVID VAC PFIZER DIL RECON 30 MCG/0.3 ML IM: CPT

## 2021-03-10 PROCEDURE — 0002A PR COVID VAC PFIZER DIL RECON 30 MCG/0.3 ML IM: CPT

## 2021-03-16 ENCOUNTER — DOCUMENTATION ONLY (OUTPATIENT)
Dept: ADMINISTRATIVE | Facility: CLINIC | Age: 86
End: 2021-03-16

## 2021-04-01 ENCOUNTER — CARE COORDINATION (OUTPATIENT)
Dept: CARDIOLOGY | Facility: CLINIC | Age: 86
End: 2021-04-01

## 2021-04-01 NOTE — PROGRESS NOTES
"Call to patient - scheduling set up to see Advanced Practice Provider (ASHLEY) Mare Palomares PA-C on Monday 4/5/21. Patient reports she has heavy breathing after she is up and around walking in the middle of the night to go to the bathroom; on inquiry, she notes it occurs when she is doing activity. Patient states she did not have any symptoms when she saw Dr Vidal in January, but this developed about a month ago and she \"wants to stay on top of it\".Patient reports she does not have shortness of breath or chest pains. ill update for ASHLEY in chart prep.   Laurence Gallagher RN 04/01/21 4:50 PM        "

## 2021-04-05 ENCOUNTER — OFFICE VISIT (OUTPATIENT)
Dept: CARDIOLOGY | Facility: CLINIC | Age: 86
End: 2021-04-05
Payer: COMMERCIAL

## 2021-04-05 VITALS
SYSTOLIC BLOOD PRESSURE: 148 MMHG | BODY MASS INDEX: 30.99 KG/M2 | HEIGHT: 63 IN | OXYGEN SATURATION: 95 % | DIASTOLIC BLOOD PRESSURE: 61 MMHG | WEIGHT: 174.9 LBS | HEART RATE: 54 BPM

## 2021-04-05 DIAGNOSIS — I27.20 PULMONARY HYPERTENSION (H): Primary | ICD-10-CM

## 2021-04-05 DIAGNOSIS — R06.09 DYSPNEA ON EXERTION: ICD-10-CM

## 2021-04-05 DIAGNOSIS — I27.20 PULMONARY HYPERTENSION (H): ICD-10-CM

## 2021-04-05 LAB
ANION GAP SERPL CALCULATED.3IONS-SCNC: 1 MMOL/L (ref 3–14)
BUN SERPL-MCNC: 26 MG/DL (ref 7–30)
CALCIUM SERPL-MCNC: 8.7 MG/DL (ref 8.5–10.1)
CHLORIDE SERPL-SCNC: 106 MMOL/L (ref 94–109)
CO2 SERPL-SCNC: 33 MMOL/L (ref 20–32)
CREAT SERPL-MCNC: 0.84 MG/DL (ref 0.52–1.04)
GFR SERPL CREATININE-BSD FRML MDRD: 61 ML/MIN/{1.73_M2}
GLUCOSE SERPL-MCNC: 99 MG/DL (ref 70–99)
NT-PROBNP SERPL-MCNC: 1447 PG/ML (ref 0–450)
POTASSIUM SERPL-SCNC: 4.4 MMOL/L (ref 3.4–5.3)
SODIUM SERPL-SCNC: 140 MMOL/L (ref 133–144)

## 2021-04-05 PROCEDURE — 36415 COLL VENOUS BLD VENIPUNCTURE: CPT | Performed by: PHYSICIAN ASSISTANT

## 2021-04-05 PROCEDURE — 83880 ASSAY OF NATRIURETIC PEPTIDE: CPT | Performed by: PHYSICIAN ASSISTANT

## 2021-04-05 PROCEDURE — 99214 OFFICE O/P EST MOD 30 MIN: CPT | Performed by: PHYSICIAN ASSISTANT

## 2021-04-05 PROCEDURE — 80048 BASIC METABOLIC PNL TOTAL CA: CPT | Performed by: PHYSICIAN ASSISTANT

## 2021-04-05 RX ORDER — FUROSEMIDE 20 MG
20 TABLET ORAL DAILY
Qty: 90 TABLET | Refills: 3 | Status: SHIPPED | OUTPATIENT
Start: 2021-04-05 | End: 2021-04-29

## 2021-04-05 ASSESSMENT — MIFFLIN-ST. JEOR: SCORE: 1178.5

## 2021-04-05 NOTE — LETTER
4/5/2021    Miriam Bird MD  [address]  [facility] [address]    RE: Tanya Graham       Dear Colleague,    I had the pleasure of seeing Tanya Graham in the [facility].        Date of Service: 04/06/2021      [facility]      Primary PH cardiologist: Dr. Kathe Vidal      HPI:  Ms. Tanya Graham is a very pleasant 89 year old female with a past medical history significant for hypertension, hyperlipidemia, breast cancer, surgical repair of carotid artery aneurysm, and atrial flutter on anticoagulation. She saw Dr. Vidal in January of this year for evaluation of elevated RVSP on echocardiogram and tricuspid valve regurgitation. At that time, Ms. Graham had no significant symptoms with minimal edema or complaints of dyspnea. As her RV function was also normal, Dr. Vidal recommended conservative therapy and made no medication changes. She was to follow up in 6 months.    I am seeing Elma today as she called our office to report some new dyspnea on exertion, and wanted to be checked out. She tells me that she has noticed over the last month or so that when she gets up at night to go to the bathroom she seems more winded. She also has trouble getting back to sleep, and some difficulty lying flat. At times, she will then go into her living room and instead sleep in her recliner. She has not had any dizziness or presyncope. She denies chest pain or new palpitations. She reports mild edema in her legs, but does not think this has worsened any. She is not weighing herself at home routinely, but her weight on our scale is up 6 lbs since she saw Dr. Vidal last. She does periodically check her oxygen sats at home and they are running 91-92% on her most recent checks. Heart rate in our office today was very well controlled in the 50-60s on exam. BP was elevated at 148/61 but her home log shows good control with systolics  consistently in the 120s.      She did not have any labs done at the time of our visit, but I sent her post visit, and these were reviewed: Na 140, K 4.4, C02 33, BUN 26, SCr 0.84, NT-proBNP 1,447.     ASSESSMENT/PLAN:      1. Pulmonary Hypertension.   --Ms. Graham has what is suspected to be mild group 2 PH based on elevated pressures on her echocardiogram. Via Echo in Dec 2020, her EF was preserved at 55-60%. She has severe biatrial enlargement, 1+ MR, and 2-3+ TR. RV pressures were elevated and RV was mildly dilated but RV function was reported as normal.    --She had been feeling well until about a month ago when she noted some mild BUCIO and what sounds to be some orthopnea as well. While she reports no worsening edema from her baseline, her weight is up 6 lbs on our scale today. Her NT-proBNP is elevated on today's labs and she does have some mild pitting edema on exam. Renal function is preserved. Will add Lasix at 20mg once daily. She can continue her hydrochlorothiazide at 25mg daily unchanged.    --I've asked her to start weighing at home daily for now and keep a log. I recommended she keep her feet elevated when she can, and we also addressed the sodium in her diet. As she lives alone, she does eat a lot of pre-packaged foods which we discussed are high in sodium. She is going to try to work on this.     2. Atrial flutter.   --Diagnosed last year. Her heart rates are well controlled on atenolol and she is not having any dizziness. Noted that she has severe biatrial enlargement on imaging.    --Continue anticoagulation with Eliquis which she is tolerating well.       Follow up plan: We will check repeat labs in 1 week and call her with results. She already has a scheduled appt at the end of this month with Dr. Vidal, which we will keep.       Orders this Visit:  Orders Placed This Encounter   Procedures     Basic metabolic panel     N terminal pro BNP outpatient     Basic metabolic panel     Orders Placed This  Encounter   Medications     furosemide (LASIX) 20 MG tablet     Sig: Take 1 tablet (20 mg) by mouth daily     Dispense:  90 tablet     Refill:  3     Medications Discontinued During This Encounter   Medication Reason     Calcium-Vitamin D-Vitamin K 500-100-40 MG-UNT-MCG CHEW Medication Reconciliation Clean Up           CURRENT MEDICATIONS:  Current Outpatient Medications   Medication Sig Dispense Refill     apixaban ANTICOAGULANT (ELIQUIS) 5 MG tablet Take 1 tablet (5 mg) by mouth 2 times daily 180 tablet 1     atenolol (TENORMIN) 50 MG tablet TAKE 1 TABLET BY MOUTH EVERY DAY 90 tablet 1     cholecalciferol (VITAMIN-D) 1000 UNIT capsule Take 1 capsule by mouth daily. Patient takes (2) capsules daily.       furosemide (LASIX) 20 MG tablet Take 1 tablet (20 mg) by mouth daily 90 tablet 3     hydrochlorothiazide (HYDRODIURIL) 25 MG tablet Take 1 tablet (25 mg) by mouth daily 90 tablet 1     lisinopril (ZESTRIL) 10 MG tablet Take 1 tablet (10 mg) by mouth daily 90 tablet 0     Multiple Vitamins-Minerals (OCUVITE-LUTEIN PO) Take  by mouth. Patient takes 2 tabs daily.       Niacinamide 500 MG TBCR Take 1 tablet by mouth daily To prevent skin cancer, per dermatologist 90 tablet 3     ondansetron (ZOFRAN ODT) 4 MG ODT tab Take 1 tablet (4 mg) by mouth every 6 hours as needed 20 tablet 0     simvastatin (ZOCOR) 10 MG tablet Take 1 tablet (10 mg) by mouth At Bedtime 90 tablet 3       ALLERGIES     Allergies   Allergen Reactions     No Clinical Screening - See Comments      Vomits from IV narcotics       PAST MEDICAL HISTORY:  Past Medical History:   Diagnosis Date     Basal cell carcinoma      Benign neoplasm of colon 4/03, 8/07    polyps, 2 and 3     Diverticulosis of colon (without mention of hemorrhage) 8/07    noted on screening colonoscopy     Essential hypertension, benign     Hypertension, Benign     Malignant neoplasm of breast (female), unspecified site 3/07    infiltrating ductal CA left breast     Mixed  "hyperlipidemia     Hyperlipidemia     NONSPECIFIC MEDICAL HISTORY 1971    left carotid aneurysm, surgical repair     NONSPECIFIC MEDICAL HISTORY 12/04, 2/07    osteopenia '04, nl DEXA '07     NONSPECIFIC MEDICAL HISTORY 4/06    ventricular ectopy; nl stress echo     Other malignant neoplasm of other specified sites of skin     skin cancer removed     Other ventral hernia without mention of obstruction or gangrene     surgical repair     Postmastectomy lymphedema syndrome 2007     Unspecified intestinal obstruction 7/07    partial small bowel obstruction into ventral hernia         Review of Systems:  Cardiovascular: negative for chest pain, palpitations, pos orthopnea, LE edema  Constitutional: negative for chills, sweats, fevers   Resp: Negative for dyspnea at rest, pos dyspnea on exertion, neg cough, known chronic lung disease  HEENT: Negative for new visual changes, frequent headaches  Gastrointestinal: negative for abdominal pain, diarrhea, blood in stool, nausea, vomiting  Hematologic/lymphatic: pos for current systemic anticoagulation, neg hx of blood clots  Neurological: negative for focal weakness, LOC, seizures, syncope/presyncope, dizziness.       Physical Exam:  Vitals: BP (!) 148/61 (BP Location: Right arm, Patient Position: Sitting, Cuff Size: Adult Regular)   Pulse 54   Ht 1.594 m (5' 2.75\")   Wt 79.3 kg (174 lb 14.4 oz)   LMP 06/02/1981   SpO2 95%   BMI 31.23 kg/m     Wt Readings from Last 4 Encounters:   04/05/21 79.3 kg (174 lb 14.4 oz)   01/14/21 76.2 kg (168 lb)   12/16/20 77.2 kg (170 lb 3.2 oz)   11/17/20 76.2 kg (168 lb)       GEN:  In general, this is a well nourished  female in no acute distress on room air.  Patient ambulatory, unaccompanied.   HEENT:  Pupils grossly equal, sclerae nonicteric.   NECK: Supple, trachea midline. Mild JVD while upright.   C/V:  Regular rate and rhythm, 1/6 PRISCILA heard best at RSB. No rub or gallop. No S3 or RV heave.   RESP: Respirations are " unlabored. No use of accessory muscles. She has a few crackles in her bases posteriorly. No wheezing or rhonchi.   GI: Abdomen soft, nontender, nondistended.   EXTREM: Trace pitting bilateral LE edema. No cyanosis or clubbing.  NEURO: Alert and oriented, cooperative. Gait not formally assessed. No obvious focal deficits.   SKIN: Warm and dry.       Recent Lab Results:    BMP RESULTS:  Lab Results   Component Value Date     04/05/2021    POTASSIUM 4.4 04/05/2021    CHLORIDE 106 04/05/2021    CO2 33 (H) 04/05/2021    ANIONGAP 1 (L) 04/05/2021    GLC 99 04/05/2021    BUN 26 04/05/2021    CR 0.84 04/05/2021    GFRESTIMATED 61 04/05/2021    GFRESTBLACK 71 04/05/2021    JAQUAN 8.7 04/05/2021        Recent Labs   Lab Test 04/05/21  1155   NTBNP 1,447*         Most recent testing:      Echocardiogram  12/28/2020     Interpretation Summary     The visual ejection fraction is estimated at 55-60%.  There is severe biatrial enlargement.  There is mild (1+) mitral regurgitation.  There is moderate to mod-severe (2-3+) tricuspid regurgitation.  Right ventricular systolic pressure is elevated, consistent with moderate  pulmonary hypertension.  There is mild (1+) aortic regurgitation.  The rhythm was atrial flutter.      A total of  35 minutes was spent today performing chart and history review, gathering HPI, physical exam, pre and post visit documentation, and care coordination.      Mare Palomares PA-C  [facility]  Pager [phone]    cc:   No referring provider defined for this encounter.

## 2021-04-05 NOTE — PATIENT INSTRUCTIONS
Visit Summary:    Today we discussed:   1. We will start Lasix (furosemide) 20mg once daily, to help get rid of some fluid.  2. We will check labs today on your way out, and again in 7-10 days. We will call you with those results.   3. Try to keep a low sodium diet, <2000mg per day if possible.  4. Start weighing yourself at home every morning after you empty your bladder. Keep a log and bring this (along with your blood pressures) to your follow up appt with Dr. Vidal.     Follow up:   With Dr Vidal in a few weeks as scheduled.     Please call my nurse Laurence at [phone] with any questions or concerns.

## 2021-04-05 NOTE — PROGRESS NOTES
Date of Service: 04/06/2021      [facility]      Primary PH cardiologist: Dr. Kathe Vidal      HPI:  Ms. Tanya Graham is a very pleasant 89 year old female with a past medical history significant for hypertension, hyperlipidemia, breast cancer, surgical repair of carotid artery aneurysm, and atrial flutter on anticoagulation. She saw Dr. Vidal in January of this year for evaluation of elevated RVSP on echocardiogram and tricuspid valve regurgitation. At that time, Ms. Graham had no significant symptoms with minimal edema or complaints of dyspnea. As her RV function was also normal, Dr. Vidal recommended conservative therapy and made no medication changes. She was to follow up in 6 months.    I am seeing Elma today as she called our office to report some new dyspnea on exertion, and wanted to be checked out. She tells me that she has noticed over the last month or so that when she gets up at night to go to the bathroom she seems more winded. She also has trouble getting back to sleep, and some difficulty lying flat. At times, she will then go into her living room and instead sleep in her recliner. She has not had any dizziness or presyncope. She denies chest pain or new palpitations. She reports mild edema in her legs, but does not think this has worsened any. She is not weighing herself at home routinely, but her weight on our scale is up 6 lbs since she saw Dr. Vidal last. She does periodically check her oxygen sats at home and they are running 91-92% on her most recent checks. Heart rate in our office today was very well controlled in the 50-60s on exam. BP was elevated at 148/61 but her home log shows good control with systolics consistently in the 120s.      She did not have any labs done at the time of our visit, but I sent her post visit, and these were reviewed: Na 140, K 4.4, C02 33, BUN 26, SCr 0.84, NT-proBNP 1,447.     ASSESSMENT/PLAN:      1. Pulmonary  Hypertension.   --Ms. Graham has what is suspected to be mild group 2 PH based on elevated pressures on her echocardiogram. Via Echo in Dec 2020, her EF was preserved at 55-60%. She has severe biatrial enlargement, 1+ MR, and 2-3+ TR. RV pressures were elevated and RV was mildly dilated but RV function was reported as normal.    --She had been feeling well until about a month ago when she noted some mild BUCIO and what sounds to be some orthopnea as well. While she reports no worsening edema from her baseline, her weight is up 6 lbs on our scale today. Her NT-proBNP is elevated on today's labs and she does have some mild pitting edema on exam. Renal function is preserved. Will add Lasix at 20mg once daily. She can continue her hydrochlorothiazide at 25mg daily unchanged.    --I've asked her to start weighing at home daily for now and keep a log. I recommended she keep her feet elevated when she can, and we also addressed the sodium in her diet. As she lives alone, she does eat a lot of pre-packaged foods which we discussed are high in sodium. She is going to try to work on this.     2. Atrial flutter.   --Diagnosed last year. Her heart rates are well controlled on atenolol and she is not having any dizziness. Noted that she has severe biatrial enlargement on imaging.    --Continue anticoagulation with Eliquis which she is tolerating well.       Follow up plan: We will check repeat labs in 1 week and call her with results. She already has a scheduled appt at the end of this month with Dr. Vidal, which we will keep.       Orders this Visit:  Orders Placed This Encounter   Procedures     Basic metabolic panel     N terminal pro BNP outpatient     Basic metabolic panel     Orders Placed This Encounter   Medications     furosemide (LASIX) 20 MG tablet     Sig: Take 1 tablet (20 mg) by mouth daily     Dispense:  90 tablet     Refill:  3     Medications Discontinued During This Encounter   Medication Reason     Calcium-Vitamin  D-Vitamin K 500-100-40 MG-UNT-MCG CHEW Medication Reconciliation Clean Up           CURRENT MEDICATIONS:  Current Outpatient Medications   Medication Sig Dispense Refill     apixaban ANTICOAGULANT (ELIQUIS) 5 MG tablet Take 1 tablet (5 mg) by mouth 2 times daily 180 tablet 1     atenolol (TENORMIN) 50 MG tablet TAKE 1 TABLET BY MOUTH EVERY DAY 90 tablet 1     cholecalciferol (VITAMIN-D) 1000 UNIT capsule Take 1 capsule by mouth daily. Patient takes (2) capsules daily.       furosemide (LASIX) 20 MG tablet Take 1 tablet (20 mg) by mouth daily 90 tablet 3     hydrochlorothiazide (HYDRODIURIL) 25 MG tablet Take 1 tablet (25 mg) by mouth daily 90 tablet 1     lisinopril (ZESTRIL) 10 MG tablet Take 1 tablet (10 mg) by mouth daily 90 tablet 0     Multiple Vitamins-Minerals (OCUVITE-LUTEIN PO) Take  by mouth. Patient takes 2 tabs daily.       Niacinamide 500 MG TBCR Take 1 tablet by mouth daily To prevent skin cancer, per dermatologist 90 tablet 3     ondansetron (ZOFRAN ODT) 4 MG ODT tab Take 1 tablet (4 mg) by mouth every 6 hours as needed 20 tablet 0     simvastatin (ZOCOR) 10 MG tablet Take 1 tablet (10 mg) by mouth At Bedtime 90 tablet 3       ALLERGIES     Allergies   Allergen Reactions     No Clinical Screening - See Comments      Vomits from IV narcotics       PAST MEDICAL HISTORY:  Past Medical History:   Diagnosis Date     Basal cell carcinoma      Benign neoplasm of colon 4/03, 8/07    polyps, 2 and 3     Diverticulosis of colon (without mention of hemorrhage) 8/07    noted on screening colonoscopy     Essential hypertension, benign     Hypertension, Benign     Malignant neoplasm of breast (female), unspecified site 3/07    infiltrating ductal CA left breast     Mixed hyperlipidemia     Hyperlipidemia     NONSPECIFIC MEDICAL HISTORY 1971    left carotid aneurysm, surgical repair     NONSPECIFIC MEDICAL HISTORY 12/04, 2/07    osteopenia '04, nl DEXA '07     NONSPECIFIC MEDICAL HISTORY 4/06    ventricular ectopy;  "nl stress echo     Other malignant neoplasm of other specified sites of skin     skin cancer removed     Other ventral hernia without mention of obstruction or gangrene     surgical repair     Postmastectomy lymphedema syndrome 2007     Unspecified intestinal obstruction 7/07    partial small bowel obstruction into ventral hernia         Review of Systems:  Cardiovascular: negative for chest pain, palpitations, pos orthopnea, LE edema  Constitutional: negative for chills, sweats, fevers   Resp: Negative for dyspnea at rest, pos dyspnea on exertion, neg cough, known chronic lung disease  HEENT: Negative for new visual changes, frequent headaches  Gastrointestinal: negative for abdominal pain, diarrhea, blood in stool, nausea, vomiting  Hematologic/lymphatic: pos for current systemic anticoagulation, neg hx of blood clots  Neurological: negative for focal weakness, LOC, seizures, syncope/presyncope, dizziness.       Physical Exam:  Vitals: BP (!) 148/61 (BP Location: Right arm, Patient Position: Sitting, Cuff Size: Adult Regular)   Pulse 54   Ht 1.594 m (5' 2.75\")   Wt 79.3 kg (174 lb 14.4 oz)   LMP 06/02/1981   SpO2 95%   BMI 31.23 kg/m     Wt Readings from Last 4 Encounters:   04/05/21 79.3 kg (174 lb 14.4 oz)   01/14/21 76.2 kg (168 lb)   12/16/20 77.2 kg (170 lb 3.2 oz)   11/17/20 76.2 kg (168 lb)       GEN:  In general, this is a well nourished  female in no acute distress on room air.  Patient ambulatory, unaccompanied.   HEENT:  Pupils grossly equal, sclerae nonicteric.   NECK: Supple, trachea midline. Mild JVD while upright.   C/V:  Regular rate and rhythm, 1/6 PRISCILA heard best at RSB. No rub or gallop. No S3 or RV heave.   RESP: Respirations are unlabored. No use of accessory muscles. She has a few crackles in her bases posteriorly. No wheezing or rhonchi.   GI: Abdomen soft, nontender, nondistended.   EXTREM: Trace pitting bilateral LE edema. No cyanosis or clubbing.  NEURO: Alert and oriented, " cooperative. Gait not formally assessed. No obvious focal deficits.   SKIN: Warm and dry.       Recent Lab Results:    BMP RESULTS:  Lab Results   Component Value Date     04/05/2021    POTASSIUM 4.4 04/05/2021    CHLORIDE 106 04/05/2021    CO2 33 (H) 04/05/2021    ANIONGAP 1 (L) 04/05/2021    GLC 99 04/05/2021    BUN 26 04/05/2021    CR 0.84 04/05/2021    GFRESTIMATED 61 04/05/2021    GFRESTBLACK 71 04/05/2021    JAQUAN 8.7 04/05/2021        Recent Labs   Lab Test 04/05/21  1155   NTBNP 1,447*         Most recent testing:      Echocardiogram  12/28/2020     Interpretation Summary     The visual ejection fraction is estimated at 55-60%.  There is severe biatrial enlargement.  There is mild (1+) mitral regurgitation.  There is moderate to mod-severe (2-3+) tricuspid regurgitation.  Right ventricular systolic pressure is elevated, consistent with moderate  pulmonary hypertension.  There is mild (1+) aortic regurgitation.  The rhythm was atrial flutter.      A total of  35 minutes was spent today performing chart and history review, gathering HPI, physical exam, pre and post visit documentation, and care coordination.      Mare Palomares PA-C  [facility]  Pager [phone]

## 2021-04-06 ENCOUNTER — TELEPHONE (OUTPATIENT)
Dept: CARDIOLOGY | Facility: CLINIC | Age: 86
End: 2021-04-06

## 2021-04-06 NOTE — TELEPHONE ENCOUNTER
Received response from Mrae:     Mare Palomares, Zenaida Key, RN   Caller: Unspecified (Today, 12:06 PM)             Yes, I did review them. Renal function is ok and as expected, BNP was elevated. Plan remains the same.   Thanks!      Called back and spoke with pt, reviewed plan per Mare's note. Pt verbalized understanding, no further questions at this time.

## 2021-04-06 NOTE — TELEPHONE ENCOUNTER
Received message from SANAZ Garvey:     Mare Palomares PA McMahon, Bullis Mary, RN             I had put in orders for her to have labs today and again in 1 week (or I thought).   I am starting Lasix 20mg daily until she sees Vidal end of the month.   However, I'd like another BMP in 1 week with this change.     Clint Garvey      Called and spoke with pt. Pt stated she is planning on having labs in 1 week but she did not schedule an appointment as she plans to go to the [facility] to have the labs drawn. Pt inquired about her lab results from yesterday, will route to Mare to review and advised pt will call back with any recommendations.     Component      Latest Ref Rng & Units 4/5/2021   Sodium      133 - 144 mmol/L 140   Potassium      3.4 - 5.3 mmol/L 4.4   Chloride      94 - 109 mmol/L 106   Carbon Dioxide      20 - 32 mmol/L 33 (H)   Anion Gap      3 - 14 mmol/L 1 (L)   Glucose      70 - 99 mg/dL 99   Urea Nitrogen      7 - 30 mg/dL 26   Creatinine      0.52 - 1.04 mg/dL 0.84   GFR Estimate      >60 mL/min/1.73:m2 61   GFR Estimate If Black      >60 mL/min/1.73:m2 71   Calcium      8.5 - 10.1 mg/dL 8.7   N-Terminal Pro Bnp      0 - 450 pg/mL 1,447 (H)

## 2021-04-12 DIAGNOSIS — I27.20 PULMONARY HYPERTENSION (H): ICD-10-CM

## 2021-04-12 PROCEDURE — 80048 BASIC METABOLIC PNL TOTAL CA: CPT | Performed by: PHYSICIAN ASSISTANT

## 2021-04-12 PROCEDURE — 36415 COLL VENOUS BLD VENIPUNCTURE: CPT | Performed by: PHYSICIAN ASSISTANT

## 2021-04-13 LAB
ANION GAP SERPL CALCULATED.3IONS-SCNC: 4 MMOL/L (ref 3–14)
BUN SERPL-MCNC: 43 MG/DL (ref 7–30)
CALCIUM SERPL-MCNC: 9 MG/DL (ref 8.5–10.1)
CHLORIDE SERPL-SCNC: 101 MMOL/L (ref 94–109)
CO2 SERPL-SCNC: 33 MMOL/L (ref 20–32)
CREAT SERPL-MCNC: 1.1 MG/DL (ref 0.52–1.04)
GFR SERPL CREATININE-BSD FRML MDRD: 44 ML/MIN/{1.73_M2}
GLUCOSE SERPL-MCNC: 107 MG/DL (ref 70–99)
POTASSIUM SERPL-SCNC: 4.9 MMOL/L (ref 3.4–5.3)
SODIUM SERPL-SCNC: 138 MMOL/L (ref 133–144)

## 2021-04-13 NOTE — PROGRESS NOTES
----- Message from SANAZ cShmitz sent at 4/13/2021  2:57 PM CDT -----  Regarding: update  Please call and get an update on patient's weight and swelling.  Her renal function has worsened slightly. If she is feeling better and weight is down, I would like to her start cutting her lasix in half to 10mg daily until she meets with Dr Vidal please.  Thx  Mare  ==================    Component      Latest Ref Rng & Units 4/12/2021   Sodium      133 - 144 mmol/L 138   Potassium      3.4 - 5.3 mmol/L 4.9   Chloride      94 - 109 mmol/L 101   Carbon Dioxide      20 - 32 mmol/L 33 (H)   Anion Gap      3 - 14 mmol/L 4   Glucose      70 - 99 mg/dL 107 (H)   Urea Nitrogen      7 - 30 mg/dL 43 (H)   Creatinine      0.52 - 1.04 mg/dL 1.10 (H)   GFR Estimate      >60 mL/min/1.73:m2 44 (L)   GFR Estimate If Black      >60 mL/min/1.73:m2 51 (L)   Calcium      8.5 - 10.1 mg/dL 9.0     Called to patient.  Patient reports her weight is down 4 lbs - she started at 170 lbs and is now holding at 166 lbs the last few days; her breathing is better, swelling is improved, and foot/leg swelling is down.   Patient currently on lasix 20 mg florence and will cut that in half to take 10 mg daily as recommended by ASHLEY. Patient will call if she notices an increase in swelling, shortness of breath or symptoms with reduction of diuretic. She remains on hydrochlorothiazide 25 mg daily.  Update to ASHLEY.    Future Appointments   Date Time Provider Department Center   4/29/2021  9:45 AM Dia Vidal MD [facility]   5/20/2021 10:30 AM Papito Thayer PA-C [facility]     Laurence Gallagher RN 04/13/21 3:55 PM

## 2021-04-13 NOTE — TELEPHONE ENCOUNTER
----- Message from SANAZ Schmitz sent at 4/13/2021  2:57 PM CDT -----  Regarding: update  Please call and get an update on patient's weight and swelling.  Her renal function has worsened slightly. If she is feeling better and weight is down, I would like to her start cutting her lasix in half to 10mg daily until she meets with Dr Young gurrola.    Thx  arianna

## 2021-04-19 ENCOUNTER — CARE COORDINATION (OUTPATIENT)
Dept: CARDIOLOGY | Facility: CLINIC | Age: 86
End: 2021-04-19

## 2021-04-19 DIAGNOSIS — I10 ESSENTIAL HYPERTENSION, BENIGN: Primary | ICD-10-CM

## 2021-04-19 NOTE — PROGRESS NOTES
Elliott Laws RN at 4/13/2021  3:34 PM  Progress Notes  Elliott Laws RN (Registered Nurse)     ----- Message from SANAZ Schmitz sent at 4/13/2021  2:57 PM CDT -----  Regarding: update  Please call and get an update on patient's weight and swelling.  Her renal function has worsened slightly. If she is feeling better and weight is down, I would like to her start cutting her lasix in half to 10mg daily until she meets with Dr Vidal please.  Thx  Mare  ==================     Component      Latest Ref Rng & Units 4/12/2021   Sodium      133 - 144 mmol/L 138   Potassium      3.4 - 5.3 mmol/L 4.9   Chloride      94 - 109 mmol/L 101   Carbon Dioxide      20 - 32 mmol/L 33 (H)   Anion Gap      3 - 14 mmol/L 4   Glucose      70 - 99 mg/dL 107 (H)   Urea Nitrogen      7 - 30 mg/dL 43 (H)   Creatinine      0.52 - 1.04 mg/dL 1.10 (H)   GFR Estimate      >60 mL/min/1.73:m2 44 (L)   GFR Estimate If Black      >60 mL/min/1.73:m2 51 (L)   Calcium      8.5 - 10.1 mg/dL 9.0      Called to patient.  Patient reports her weight is down 4 lbs - she started at 170 lbs and is now holding at 166 lbs the last few days; her breathing is better, swelling is improved, and foot/leg swelling is down.   Patient currently on lasix 20 mg florence and will cut that in half to take 10 mg daily as recommended by ASHLEY. Patient will call if she notices an increase in swelling, shortness of breath or symptoms with reduction of diuretic. She remains on hydrochlorothiazide 25 mg daily.  Update to ASHLEY.            Future Appointments   Date Time Provider Department Center   4/29/2021  9:45 AM Dia Vidal MD [facility]   5/20/2021 10:30 AM Papito Thayer PA-C [facility]      Laurence Gallagher RN 04/13/21 3:55 PM

## 2021-04-19 NOTE — PROGRESS NOTES
4/19/21 Called from patient with update that her weight is up 4 lbs since cutting lasix in half 4/13/21.Patient reports her shortness of breath is better since being on pil and not worse  Palient reports always having right foot swelling and this is a little worse.   Weights: Before lasix at 170 lbs, lost 4 lbs 166 after lasix 20 mg daily started.  Recent weights since decrease of lasix 10 mg daily 4/13/21.  4/15  166 lbs  Thursday 4/16  168  4/17  168  4/18  172  4/19  170  Update to Advanced Practice Provider (ASHLEY) Mare Palomares PA-C.  Laurence Gallagher RN 04/19/21 10:31 AM    =====================================    Mare Palomares PA McMahon, Bullis Mary, RN   Caller: Unspecified (Today, 10:27 AM)             Lets maybe try alternating 20mg every other day with 10mg of the Lasix for now.   I wonder if she is eating occasional salty meals (as there was a 4 lb increase in 1 day as per your note), so please educate her on this.   Make sure she has a repeat BMP when she sees Dr Vidal.   Thx   Mare      =====================================    Called to patient with ASHLEY plan and recommendations. Patient states understanding and agreement to plan. Patient would like to go to [facility] for her labs on 4/27/21. Order for BMP in Epic.  Future Appointments   Date Time Provider Department Center   4/29/2021  9:45 AM Dia Vidal MD [facility]   5/20/2021 10:30 AM Papito Thayer PA-C [facility]   Laurence Gallagher RN 04/19/21 11:14 AM

## 2021-04-27 DIAGNOSIS — I10 ESSENTIAL HYPERTENSION, BENIGN: ICD-10-CM

## 2021-04-27 LAB
ANION GAP SERPL CALCULATED.3IONS-SCNC: 1 MMOL/L (ref 3–14)
BUN SERPL-MCNC: 36 MG/DL (ref 7–30)
CALCIUM SERPL-MCNC: 8.8 MG/DL (ref 8.5–10.1)
CHLORIDE SERPL-SCNC: 101 MMOL/L (ref 94–109)
CO2 SERPL-SCNC: 37 MMOL/L (ref 20–32)
CREAT SERPL-MCNC: 0.88 MG/DL (ref 0.52–1.04)
GFR SERPL CREATININE-BSD FRML MDRD: 58 ML/MIN/{1.73_M2}
GLUCOSE SERPL-MCNC: 92 MG/DL (ref 70–99)
POTASSIUM SERPL-SCNC: 3.7 MMOL/L (ref 3.4–5.3)
SODIUM SERPL-SCNC: 139 MMOL/L (ref 133–144)

## 2021-04-27 PROCEDURE — 80048 BASIC METABOLIC PNL TOTAL CA: CPT | Performed by: PHYSICIAN ASSISTANT

## 2021-04-27 PROCEDURE — 36415 COLL VENOUS BLD VENIPUNCTURE: CPT | Performed by: PHYSICIAN ASSISTANT

## 2021-04-29 ENCOUNTER — CARE COORDINATION (OUTPATIENT)
Dept: CARDIOLOGY | Facility: CLINIC | Age: 86
End: 2021-04-29

## 2021-04-29 ENCOUNTER — OFFICE VISIT (OUTPATIENT)
Dept: CARDIOLOGY | Facility: CLINIC | Age: 86
End: 2021-04-29
Attending: INTERNAL MEDICINE
Payer: COMMERCIAL

## 2021-04-29 VITALS
SYSTOLIC BLOOD PRESSURE: 125 MMHG | HEIGHT: 63 IN | OXYGEN SATURATION: 96 % | WEIGHT: 170 LBS | BODY MASS INDEX: 30.12 KG/M2 | HEART RATE: 68 BPM | DIASTOLIC BLOOD PRESSURE: 59 MMHG

## 2021-04-29 DIAGNOSIS — Z85.3 PERSONAL HISTORY OF MALIGNANT NEOPLASM OF BREAST: ICD-10-CM

## 2021-04-29 DIAGNOSIS — I36.1 NONRHEUMATIC TRICUSPID VALVE REGURGITATION: ICD-10-CM

## 2021-04-29 DIAGNOSIS — I10 ESSENTIAL HYPERTENSION, BENIGN: ICD-10-CM

## 2021-04-29 DIAGNOSIS — I48.3 TYPICAL ATRIAL FLUTTER (H): ICD-10-CM

## 2021-04-29 DIAGNOSIS — I27.20 PULMONARY HYPERTENSION (H): ICD-10-CM

## 2021-04-29 PROCEDURE — 99214 OFFICE O/P EST MOD 30 MIN: CPT | Performed by: INTERNAL MEDICINE

## 2021-04-29 PROCEDURE — 93000 ELECTROCARDIOGRAM COMPLETE: CPT | Performed by: INTERNAL MEDICINE

## 2021-04-29 RX ORDER — HYDROCHLOROTHIAZIDE 25 MG/1
25 TABLET ORAL EVERY MORNING
Qty: 100 TABLET | Refills: 6 | COMMUNITY
Start: 2021-04-29 | End: 2021-05-26

## 2021-04-29 RX ORDER — ATENOLOL 50 MG/1
50 TABLET ORAL DAILY
Qty: 100 TABLET | Refills: 6 | COMMUNITY
Start: 2021-04-29 | End: 2021-06-01

## 2021-04-29 RX ORDER — SIMVASTATIN 10 MG
10 TABLET ORAL AT BEDTIME
Qty: 100 TABLET | Refills: 6 | COMMUNITY
Start: 2021-04-29 | End: 2021-11-09

## 2021-04-29 RX ORDER — FUROSEMIDE 20 MG
10 TABLET ORAL EVERY MORNING
Qty: 90 TABLET | Refills: 3 | COMMUNITY
Start: 2021-04-29 | End: 2021-11-22

## 2021-04-29 RX ORDER — LISINOPRIL 10 MG/1
10 TABLET ORAL EVERY MORNING
Qty: 100 TABLET | Refills: 6 | COMMUNITY
Start: 2021-04-29 | End: 2021-06-01

## 2021-04-29 ASSESSMENT — MIFFLIN-ST. JEOR: SCORE: 1156.27

## 2021-04-29 NOTE — LETTER
4/29/2021    [place]  [address] [facility] [facility] [address]    RE: Tanya Graham       Dear Colleague,    I had the pleasure of seeing Tanya Graham in the [facility].    Clinic visit note dictated. Dictation reference number - [id number]          REVIEW OF SYSTEMS:  A comprehensive 10-point review of systems was completed and the pertinent positives are documented in the history of present illness.    Skin:  Positive for     Eyes:  Positive for glasses  ENT:  Negative    Respiratory:  Positive for shortness of breath;dyspnea on exertion  Cardiovascular:    Positive for;edema  Gastroenterology: Negative    Genitourinary:  Negative    Musculoskeletal:  Negative    Neurologic:  Negative    Psychiatric:  Positive for excessive stress;anxiety;depression  Heme/Lymph/Imm:  Negative    Endocrine:  Negative      CURRENT MEDICATIONS:  Current Outpatient Medications   Medication Sig Dispense Refill     apixaban ANTICOAGULANT (ELIQUIS) 5 MG tablet Take 1 tablet (5 mg) by mouth 2 times daily 200 tablet 6     atenolol (TENORMIN) 50 MG tablet Take 1 tablet (50 mg) by mouth daily 100 tablet 6     cholecalciferol (VITAMIN-D) 1000 UNIT capsule Take 1 capsule by mouth daily. Patient takes (2) capsules daily.       furosemide (LASIX) 20 MG tablet Take 0.5 tablets (10 mg) by mouth every morning 90 tablet 3     hydrochlorothiazide (HYDRODIURIL) 25 MG tablet Take 1 tablet (25 mg) by mouth every morning 100 tablet 6     lisinopril (ZESTRIL) 10 MG tablet Take 1 tablet (10 mg) by mouth every morning 100 tablet 6     Multiple Vitamins-Minerals (OCUVITE-LUTEIN PO) Take  by mouth. Patient takes 2 tabs daily.       Niacinamide 500 MG TBCR Take 1 tablet by mouth daily To prevent skin cancer, per dermatologist 90 tablet 3     ondansetron (ZOFRAN ODT) 4 MG ODT tab Take 1 tablet (4 mg) by mouth every 6 hours as needed 20 tablet 0     simvastatin (ZOCOR) 10 MG tablet Take 1  tablet (10 mg) by mouth At Bedtime 100 tablet 6         ALLERGIES:  Allergies   Allergen Reactions     No Clinical Screening - See Comments      Vomits from IV narcotics       PAST MEDICAL HISTORY:    Past Medical History:   Diagnosis Date     Basal cell carcinoma      Benign neoplasm of colon 4/03, 8/07    polyps, 2 and 3     Diverticulosis of colon (without mention of hemorrhage) 8/07    noted on screening colonoscopy     Essential hypertension, benign     Hypertension, Benign     Malignant neoplasm of breast (female), unspecified site 3/07    infiltrating ductal CA left breast     Mixed hyperlipidemia     Hyperlipidemia     NONSPECIFIC MEDICAL HISTORY 1971    left carotid aneurysm, surgical repair     NONSPECIFIC MEDICAL HISTORY 12/04, 2/07    osteopenia '04, nl DEXA '07     NONSPECIFIC MEDICAL HISTORY 4/06    ventricular ectopy; nl stress echo     Other malignant neoplasm of other specified sites of skin     skin cancer removed     Other ventral hernia without mention of obstruction or gangrene     surgical repair     Postmastectomy lymphedema syndrome 2007     Unspecified intestinal obstruction 7/07    partial small bowel obstruction into ventral hernia       PAST SURGICAL HISTORY:    Past Surgical History:   Procedure Laterality Date     BIOPSY  12/16/2020    On left side of head      BIOPSY OF BREAST, NEEDLE CORE  3/07    left breast, infiltrating ductal CA     BREAST LUMPECTOMY, RT/LT  4/07    left breast with sentienl node bx     HYSTERECTOMY, RICH      RICH/BSO-due to prolapsed bladder?     LAP VENTRAL HERNIA REPAIR  5/07    ventral herniorrhaphy     SURGICAL HISTORY OF -   1971    left carotid aneurysm     SURGICAL HISTORY OF -   2006    bilat cataract     SURGICAL HISTORY OF -   2000    bladder repair     SURGICAL HISTORY OF -   2002    ventral hernia repair       FAMILY HISTORY:    Family History   Problem Relation Age of Onset     C.A.D. Mother      Cancer Father         unknown primary cancer     Cancer  "Brother         prostate     Diabetes Daughter      Cardiovascular Daughter         MI age 37,hx heart transplant     Gastrointestinal Disease Daughter         celiac sprue     Connective Tissue Disorder Daughter         fibromyalgia       SOCIAL HISTORY:    Social History     Socioeconomic History     Marital status:      Spouse name: Hollis     Number of children: 2     Years of education: None     Highest education level: None   Occupational History     Employer: RETIRED   Social Needs     Financial resource strain: Not hard at all     Food insecurity     Worry: Never true     Inability: Never true     Transportation needs     Medical: No     Non-medical: No   Tobacco Use     Smoking status: Never Smoker     Smokeless tobacco: Never Used   Substance and Sexual Activity     Alcohol use: No     Drug use: No     Sexual activity: Yes     Partners: Male     Comment: postmenopausal   Lifestyle     Physical activity     Days per week: None     Minutes per session: None     Stress: Not at all   Relationships     Social connections     Talks on phone: More than three times a week     Gets together: None     Attends [employer] service: Never     Active member of club or organization: None     Attends meetings of clubs or organizations: None     Relationship status: None     Intimate partner violence     Fear of current or ex partner: No     Emotionally abused: No     Physically abused: No     Forced sexual activity: No   Other Topics Concern     Parent/sibling w/ CABG, MI or angioplasty before 65F 55M? Not Asked   Social History Narrative     None       PHYSICAL EXAM:    Vitals: /59 (BP Location: Right arm, Patient Position: Sitting, Cuff Size: Adult Regular)   Pulse 68   Ht 1.594 m (5' 2.75\")   Wt 77.1 kg (170 lb)   LMP 06/02/1981   SpO2 96%   BMI 30.35 kg/m    Wt Readings from Last 5 Encounters:   04/29/21 77.1 kg (170 lb)   04/05/21 79.3 kg (174 lb 14.4 oz)   01/14/21 76.2 kg (168 lb)   12/16/20 77.2 " kg (170 lb 3.2 oz)   11/17/20 76.2 kg (168 lb)               Encounter Diagnoses   Name Primary?     Nonrheumatic tricuspid valve regurgitation      Pulmonary hypertension (H)      Typical atrial flutter (H)      Essential hypertension, benign      Personal history of malignant neoplasm of breast        Orders Placed This Encounter   Procedures     Follow-Up with Pulmonary Hypertension Clinic     EKG 12-lead complete w/read - Clinics (performed today)     Echocardiogram Complete     Thank you for allowing me to participate in the care of your patient.      Sincerely,     Dia Vidal MD     [facility]    cc:   Dia Vidal MD  [address] [place] [id number]

## 2021-04-29 NOTE — PROGRESS NOTES
"Service Date: 04/29/2021    PRIMARY CARE PROVIDER:  Miriam Bird MD    REASON FOR VISIT  1.  Functional tricuspid valve with regurgitation with secondary pulmonary hypertension based on elevated RVSP on echocardiogram, chronic atrial fibrillation.    HISTORY OF PRESENT ILLNESS:    It was my pleasure to follow up with this delightful 89-year-old lady who is comanaged by myself and Mare Palomares PA-C.    Elma appears much younger than her stated age, is slim, lives on her own in our [facility], has a small Jaswinder dog, 2 adult children.  She has been  for the last 10 years, independent, self caring, never tobacco user, BMI 30 kg/m2.  In fact, she drove here from [place] on her own.  She is mentally very astute.    Elma's history is significant for:  1.  Chronic atrial fibrillation diagnosed in 06/20/2020.  Likely had it for years before that.  Rate control and apixaban anticoagulation strategy.  2.  Hypertension.  Well controlled on atenolol, lisinopril and hydrochlorothiazide.  3.  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  4.  Left breast cancer, status post surgery.  5.  Moderately severe tricuspid valve regurgitation, functional, secondary to severe biatrial enlargement from the atrial fibrillation.  Preserved right ventricular systolic function.    I had seen Elma approximately 4 months ago.  In the interval, she followed up with Mare Palomares due to shortness of breath and a 6-pound weight gain.  Mare appropriately put her on low-dose furosemide -- currently on 10 mg on alternate days.  Her breathing has improved, weight is down by 4 pounds, electrolytes are satisfactory with a sodium of 139, potassium 3.7, creatinine 0.8.    Elma says, \"I am doing great, I do gardening every day and I have just planted 200 tulips in my garden, I still drive and feel good.\"     DIAGNOSTIC DATA:    Labs:  Renal panel as above, normal CBC, TSH 3.2.    EKG done today.  Personally reviewed image. "  Rate controlled atrial fibrillation of 60 BPM.  She is on atenolol 50 mg daily.    Last echocardiogram was in 12/2020.  Normal LV size.  LVEF 55%-60%.  Severe biatrial enlargement in the context of atrial fibrillation.  Mildly dilated right ventricle with normal systolic function.  Moderately severe tricuspid valve regurgitation with RVSP of 43 mmHg consistent with moderate pulmonary hypertension.    PHYSICAL EXAMINATION:    GENERAL:  Appears younger than stated age, no skin bruising.  No clubbing.  VITAL SIGNS:  /59, pulse 68 BPM, weight 170 pounds, sats 96% on room air.  EXTREMITIES:  She has 1 to 2+ edema with skin changes suggestive of chronic venous stasis that extends up to her knees.  CARDIOVASCULAR:  Heart sounds are irregularly irregular, apical impulse nondisplaced.  No parasternal heave.  She has a barely audible soft pansystolic murmur heard at the right sternal border that augments with inspiration and consistent with mild tricuspid valve regurgitation.  No diastolic rumble.  LUNGS:  Clear to auscultation.  ABDOMEN:  Soft, nontender.    DIAGNOSES:    1.  Permanent atrial fibrillation.  2.  Functional tricuspid valve regurgitation with interval improvement with medical management.  3.  Pulmonary hypertension based on elevated RVSP, secondary to #2.    ASSESSMENT:    This is a delightful 89-year-old lady is cardiovascularly stable after being appropriately managed with diuretic therapy.  At her advanced age and with excellent functional capacity, there is no indication to consider valve therapy (either surgical or transcatheter).  Right ventricular systolic pressure is preserved.  In fact, on clinical exam today, her TR is decreased from moderate to mild.  We will confirm with downstream echocardiogram.    1.  Take furosemide 10 mg daily instead of every other day.  This will be convenient for the patient and she does have lower extremity edema.  If her creatinine increases slightly, we will accept  this.  2.  Continue hydrochlorothiazide 25 mg daily.  3.  No other changes to medications and all prescriptions renewed for a year.  4.  I will follow up with her in 6 months in the pulmonary hypertension clinic with an echocardiogram alone.  No indication for routine labs unless there has been interval change in diuretic dosage.    It was my pleasure to visit with Elma.    cc:  Miriam Bird MD  [facility]   [facility], [address]  [place]  [address]      [facility] Kathe Vidal MD        D: 2021   T: 2021   MT: michelle    Name:     SANIA SANTANA  MRN:      [id number]        Account:      [id number]   :      1931           Service Date: 2021       Document: [id number]

## 2021-04-29 NOTE — PROGRESS NOTES
Clinic visit note dictated. Dictation reference number - [id number]          REVIEW OF SYSTEMS:  A comprehensive 10-point review of systems was completed and the pertinent positives are documented in the history of present illness.    Skin:  Positive for     Eyes:  Positive for glasses  ENT:  Negative    Respiratory:  Positive for shortness of breath;dyspnea on exertion  Cardiovascular:    Positive for;edema  Gastroenterology: Negative    Genitourinary:  Negative    Musculoskeletal:  Negative    Neurologic:  Negative    Psychiatric:  Positive for excessive stress;anxiety;depression  Heme/Lymph/Imm:  Negative    Endocrine:  Negative      CURRENT MEDICATIONS:  Current Outpatient Medications   Medication Sig Dispense Refill     apixaban ANTICOAGULANT (ELIQUIS) 5 MG tablet Take 1 tablet (5 mg) by mouth 2 times daily 200 tablet 6     atenolol (TENORMIN) 50 MG tablet Take 1 tablet (50 mg) by mouth daily 100 tablet 6     cholecalciferol (VITAMIN-D) 1000 UNIT capsule Take 1 capsule by mouth daily. Patient takes (2) capsules daily.       furosemide (LASIX) 20 MG tablet Take 0.5 tablets (10 mg) by mouth every morning 90 tablet 3     hydrochlorothiazide (HYDRODIURIL) 25 MG tablet Take 1 tablet (25 mg) by mouth every morning 100 tablet 6     lisinopril (ZESTRIL) 10 MG tablet Take 1 tablet (10 mg) by mouth every morning 100 tablet 6     Multiple Vitamins-Minerals (OCUVITE-LUTEIN PO) Take  by mouth. Patient takes 2 tabs daily.       Niacinamide 500 MG TBCR Take 1 tablet by mouth daily To prevent skin cancer, per dermatologist 90 tablet 3     ondansetron (ZOFRAN ODT) 4 MG ODT tab Take 1 tablet (4 mg) by mouth every 6 hours as needed 20 tablet 0     simvastatin (ZOCOR) 10 MG tablet Take 1 tablet (10 mg) by mouth At Bedtime 100 tablet 6         ALLERGIES:  Allergies   Allergen Reactions     No Clinical Screening - See Comments      Vomits from IV narcotics       PAST MEDICAL HISTORY:    Past Medical History:   Diagnosis Date     Basal  cell carcinoma      Benign neoplasm of colon 4/03, 8/07    polyps, 2 and 3     Diverticulosis of colon (without mention of hemorrhage) 8/07    noted on screening colonoscopy     Essential hypertension, benign     Hypertension, Benign     Malignant neoplasm of breast (female), unspecified site 3/07    infiltrating ductal CA left breast     Mixed hyperlipidemia     Hyperlipidemia     NONSPECIFIC MEDICAL HISTORY 1971    left carotid aneurysm, surgical repair     NONSPECIFIC MEDICAL HISTORY 12/04, 2/07    osteopenia '04, nl DEXA '07     NONSPECIFIC MEDICAL HISTORY 4/06    ventricular ectopy; nl stress echo     Other malignant neoplasm of other specified sites of skin     skin cancer removed     Other ventral hernia without mention of obstruction or gangrene     surgical repair     Postmastectomy lymphedema syndrome 2007     Unspecified intestinal obstruction 7/07    partial small bowel obstruction into ventral hernia       PAST SURGICAL HISTORY:    Past Surgical History:   Procedure Laterality Date     BIOPSY  12/16/2020    On left side of head      BIOPSY OF BREAST, NEEDLE CORE  3/07    left breast, infiltrating ductal CA     BREAST LUMPECTOMY, RT/LT  4/07    left breast with sentienl node bx     HYSTERECTOMY, RICH      RICH/BSO-due to prolapsed bladder?     LAP VENTRAL HERNIA REPAIR  5/07    ventral herniorrhaphy     SURGICAL HISTORY OF -   1971    left carotid aneurysm     SURGICAL HISTORY OF -   2006    bilat cataract     SURGICAL HISTORY OF -   2000    bladder repair     SURGICAL HISTORY OF -   2002    ventral hernia repair       FAMILY HISTORY:    Family History   Problem Relation Age of Onset     C.A.D. Mother      Cancer Father         unknown primary cancer     Cancer Brother         prostate     Diabetes Daughter      Cardiovascular Daughter         MI age 37,hx heart transplant     Gastrointestinal Disease Daughter         celiac sprue     Connective Tissue Disorder Daughter         fibromyalgia       SOCIAL  "HISTORY:    Social History     Socioeconomic History     Marital status:      Spouse name: Hollis     Number of children: 2     Years of education: None     Highest education level: None   Occupational History     Employer: RETIRED   Social Needs     Financial resource strain: Not hard at all     Food insecurity     Worry: Never true     Inability: Never true     Transportation needs     Medical: No     Non-medical: No   Tobacco Use     Smoking status: Never Smoker     Smokeless tobacco: Never Used   Substance and Sexual Activity     Alcohol use: No     Drug use: No     Sexual activity: Yes     Partners: Male     Comment: postmenopausal   Lifestyle     Physical activity     Days per week: None     Minutes per session: None     Stress: Not at all   Relationships     Social connections     Talks on phone: More than three times a week     Gets together: None     Attends Jain service: Never     Active member of club or organization: None     Attends meetings of clubs or organizations: None     Relationship status: None     Intimate partner violence     Fear of current or ex partner: No     Emotionally abused: No     Physically abused: No     Forced sexual activity: No   Other Topics Concern     Parent/sibling w/ CABG, MI or angioplasty before 65F 55M? Not Asked   Social History Narrative     None       PHYSICAL EXAM:    Vitals: /59 (BP Location: Right arm, Patient Position: Sitting, Cuff Size: Adult Regular)   Pulse 68   Ht 1.594 m (5' 2.75\")   Wt 77.1 kg (170 lb)   LMP 06/02/1981   SpO2 96%   BMI 30.35 kg/m    Wt Readings from Last 5 Encounters:   04/29/21 77.1 kg (170 lb)   04/05/21 79.3 kg (174 lb 14.4 oz)   01/14/21 76.2 kg (168 lb)   12/16/20 77.2 kg (170 lb 3.2 oz)   11/17/20 76.2 kg (168 lb)               Encounter Diagnoses   Name Primary?     Nonrheumatic tricuspid valve regurgitation      Pulmonary hypertension (H)      Typical atrial flutter (H)      Essential hypertension, benign  "     Personal history of malignant neoplasm of breast        Orders Placed This Encounter   Procedures     Follow-Up with Pulmonary Hypertension Clinic     EKG 12-lead complete w/read - Clinics (performed today)     Echocardiogram Complete

## 2021-04-29 NOTE — PROGRESS NOTES
Pulmonary Hypertension Clinic  Provider:  Dr GENEVIEVE Vidal  Visit Date:   4 / 29 / 2021  Location:  [facility]    Review of After Visit Summary (AVS) / Instructions from provider:   The AVS and instructions from the provider were reviewed in detail with the patient following their office visit with Dr. Vidal.   Family present: none  Reviewed rationale for staying on lasix and eliquis for history of atrial fibrillation and tricuspid valve disease per provider request.  The patient was educated on the outlined plan of care including tests, labs, medication changes, and follow up. Patient verbalized understanding of the information and stated agreement to the plan. Instructed patient to call with any questions or concerns.     Instructions from provider:  MEDICATION CHANGES:  1.  Take a half a tablet of 20 mg furosemide every morning (instead of every other day).  2.  No changes to other medications.  TESTING AND FOLLOW-UP:  1.  In 6 months, you will get a heart ultrasound and follow-up with the Dr. Vidal in the pulmonary hypertension clinic. Routine labs not needed.     Return appointment:   Patient was given instructions on when and how to schedule their next office visit with the clinic.     NEMO Rogers, BSN, RN  RN Care Coordinator  [facility] locations  [phone]

## 2021-04-29 NOTE — PATIENT INSTRUCTIONS
MEDICATION CHANGES:  1.  Take a half a tablet of 20 mg furosemide every morning (instead of every other day).  2.  No changes to other medications.    TESTING AND FOLLOW-UP:  1.  In 6 months, you will get a heart ultrasound and follow-up with the Dr. Vidal in the pulmonary hypertension clinic.  Routine labs not needed.    If you have any questions or concerns, please call my nurse Laurence Laws at [phone].

## 2021-05-13 DIAGNOSIS — I10 ESSENTIAL HYPERTENSION, BENIGN: ICD-10-CM

## 2021-05-13 RX ORDER — HYDROCHLOROTHIAZIDE 25 MG/1
TABLET ORAL
Qty: 90 TABLET | Refills: 1
Start: 2021-05-13

## 2021-05-26 ENCOUNTER — OFFICE VISIT (OUTPATIENT)
Dept: FAMILY MEDICINE | Facility: CLINIC | Age: 86
End: 2021-05-26
Payer: COMMERCIAL

## 2021-05-26 VITALS
TEMPERATURE: 98.3 F | HEART RATE: 87 BPM | RESPIRATION RATE: 16 BRPM | OXYGEN SATURATION: 96 % | SYSTOLIC BLOOD PRESSURE: 130 MMHG | HEIGHT: 63 IN | DIASTOLIC BLOOD PRESSURE: 80 MMHG | WEIGHT: 171 LBS | BODY MASS INDEX: 30.3 KG/M2

## 2021-05-26 DIAGNOSIS — R42 VERTIGO: ICD-10-CM

## 2021-05-26 DIAGNOSIS — I10 ESSENTIAL HYPERTENSION, BENIGN: Primary | ICD-10-CM

## 2021-05-26 DIAGNOSIS — I36.1 NONRHEUMATIC TRICUSPID VALVE REGURGITATION: ICD-10-CM

## 2021-05-26 DIAGNOSIS — C50.312 MALIGNANT NEOPLASM OF LOWER-INNER QUADRANT OF LEFT FEMALE BREAST, UNSPECIFIED ESTROGEN RECEPTOR STATUS (H): ICD-10-CM

## 2021-05-26 DIAGNOSIS — R23.8 REDNESS AND SWELLING OF LOWER LEG: ICD-10-CM

## 2021-05-26 DIAGNOSIS — M79.89 REDNESS AND SWELLING OF LOWER LEG: ICD-10-CM

## 2021-05-26 PROCEDURE — 99214 OFFICE O/P EST MOD 30 MIN: CPT | Performed by: PHYSICIAN ASSISTANT

## 2021-05-26 RX ORDER — MECLIZINE HYDROCHLORIDE 25 MG/1
25 TABLET ORAL 3 TIMES DAILY PRN
Qty: 20 TABLET | Refills: 1 | Status: SHIPPED | OUTPATIENT
Start: 2021-05-26

## 2021-05-26 RX ORDER — ATENOLOL 50 MG/1
50 TABLET ORAL DAILY
Qty: 100 TABLET | Refills: 6 | Status: CANCELLED | OUTPATIENT
Start: 2021-05-26

## 2021-05-26 RX ORDER — LISINOPRIL 10 MG/1
10 TABLET ORAL EVERY MORNING
Qty: 100 TABLET | Refills: 6 | Status: CANCELLED | OUTPATIENT
Start: 2021-05-26

## 2021-05-26 RX ORDER — ONDANSETRON 4 MG/1
4 TABLET, ORALLY DISINTEGRATING ORAL EVERY 6 HOURS PRN
Qty: 20 TABLET | Refills: 0 | Status: CANCELLED | OUTPATIENT
Start: 2021-05-26

## 2021-05-26 RX ORDER — ONDANSETRON 4 MG/1
4 TABLET, ORALLY DISINTEGRATING ORAL EVERY 6 HOURS PRN
Qty: 20 TABLET | Refills: 0 | Status: SHIPPED | OUTPATIENT
Start: 2021-05-26

## 2021-05-26 RX ORDER — HYDROCHLOROTHIAZIDE 12.5 MG/1
12.5 TABLET ORAL EVERY MORNING
Qty: 90 TABLET | Refills: 0 | Status: SHIPPED | OUTPATIENT
Start: 2021-05-26 | End: 2021-08-09

## 2021-05-26 ASSESSMENT — MIFFLIN-ST. JEOR: SCORE: 1164.78

## 2021-05-26 NOTE — PROGRESS NOTES
"    Assessment & Plan     Essential hypertension, benign  I do have concerns about the pulses/color changes of LE's.  Will order arterial US to evaluate.  We decided to keep lasix dosing the same.  Decrease hydrochlorothiazide due to subjective complain of some dizziness and low BP's.  Follow up one month to recheck.  Continue to check BP's at home.  - hydrochlorothiazide (HYDRODIURIL) 12.5 MG tablet; Take 1 tablet (12.5 mg) by mouth every morning  - US Lower Extremity Arterial Duplex Bilateral; Future    Malignant neoplasm of lower-inner quadrant of left female breast, unspecified estrogen receptor status (H)  Breat exam normal.    Vertigo  Refilled meds for previous issues with vertigo.  - ondansetron (ZOFRAN ODT) 4 MG ODT tab; Take 1 tablet (4 mg) by mouth every 6 hours as needed  - meclizine (ANTIVERT) 25 MG tablet; Take 1 tablet (25 mg) by mouth 3 times daily as needed for dizziness    Redness and swelling of lower leg  Follow up pending results.  - US Lower Extremity Arterial Duplex Bilateral; Future    Nonrheumatic tricuspid valve regurgitation  Discussed ECHO with her today to the best of my ability- follow with cardiology.       BMI:   Estimated body mass index is 30.29 kg/m  as calculated from the following:    Height as of this encounter: 1.6 m (5' 3\").    Weight as of this encounter: 77.6 kg (171 lb).       Return in about 1 month (around 6/26/2021).    ALEXANDRA [facility] ENEDELIA Wills is a 89 year old who presents for the following health issues:    HPI       Hyperlipidemia Follow-Up      Are you regularly taking any medication or supplement to lower your cholesterol?   Yes- Simvastatin    Are you having muscle aches or other side effects that you think could be caused by your cholesterol lowering medication?  No    Hypertension Follow-up      Do you check your blood pressure regularly outside of the clinic? Yes     Are you following a low salt diet? " "Yes    Are your blood pressures ever more than 140 on the top number (systolic) OR more   than 90 on the bottom number (diastolic), for example 140/90? No  Earlier today= 113/56  Yesterday= 106 60      How many servings of fruits and vegetables do you eat daily?  2-3    On average, how many sweetened beverages do you drink each day (Examples: soda, juice, sweet tea, etc.  Do NOT count diet or artificially sweetened beverages)?   3    How many days per week do you exercise enough to make your heart beat faster? 7-- going up and down stairs    How many minutes a day do you exercise enough to make your heart beat faster? 20 - 29    How many days per week do you miss taking your medication? 0      Elma is feeling well today.  She is in need of med check for lipids and HTN meds.  She was seen by cardiology in April- 10mg Lasix changed to daily.  Feels her BP readings are too low.  She brings them in today and running in low 100's over 60/70 at home (different in clinic however).  She is wondering about changing her medications today.    BP Readings from Last 6 Encounters:   05/26/21 130/80   04/29/21 125/59   04/05/21 (!) 148/61   01/14/21 128/54   12/16/20 130/72   11/17/20 120/64     She also is wondering about her last ECHO.  She is confused by the verbal report she was given by cardiology.  She said they told her she has a \"hole in a valve\".  She is worried about this and what this could mean.    She needs also a breast exam for hx of breast ca.  She has elected to stop mammograms.    She has noticed in the last week that her lower legs are red in color.  She always has mild swelling.  No pain noted.  They always are bit cold.  The color change is new.      Review of Systems   Constitutional, HEENT, cardiovascular, pulmonary, gi and gu systems are negative, except as otherwise noted.      Objective    /80 (BP Location: Right arm, Patient Position: Sitting, Cuff Size: Adult Regular)   Pulse 87   Temp 98.3  F " "(36.8  C) (Oral)   Resp 16   Ht 1.6 m (5' 3\")   Wt 77.6 kg (171 lb)   LMP 06/02/1981   SpO2 96%   BMI 30.29 kg/m    Body mass index is 30.29 kg/m .  Physical Exam   GENERAL: healthy, alert and no distress  NECK: no adenopathy, no asymmetry, masses, or scars and thyroid normal to palpation  RESP: lungs clear to auscultation - no rales, rhonchi or wheezes  BREAST: normal without masses, tenderness or nipple discharge and no palpable axillary masses or adenopathy  CV: regular rates and rhythm, normal S1 S2, no S3 or S4, no murmur, click or rub, peripheral pulses difficult to find, trace bilateral  lower extremity pitting edema to midshin and color rebecca to just below knee  PSYCH: mentation appears normal, affect normal/bright          "

## 2021-05-27 DIAGNOSIS — I10 ESSENTIAL HYPERTENSION, BENIGN: ICD-10-CM

## 2021-05-27 DIAGNOSIS — I48.92 ATRIAL FLUTTER, PAROXYSMAL (H): Primary | ICD-10-CM

## 2021-05-27 DIAGNOSIS — I48.92 UNSPECIFIED ATRIAL FLUTTER (H): ICD-10-CM

## 2021-05-27 NOTE — TELEPHONE ENCOUNTER
Prescriptions listed as historical per Dr. Vidal at visit 4/29/2021. Previously ordered by Dr. Bird. No direction change.    Visit with AP 5/26/2021 for HTN. RTC 1 month.    Please sign and send to pharmacy if approved.    Mely Katz RN

## 2021-06-01 RX ORDER — ATENOLOL 50 MG/1
TABLET ORAL
Qty: 90 TABLET | Refills: 1 | Status: SHIPPED | OUTPATIENT
Start: 2021-06-01 | End: 2021-11-22

## 2021-06-01 RX ORDER — APIXABAN 5 MG/1
TABLET, FILM COATED ORAL
Qty: 180 TABLET | Refills: 1 | Status: SHIPPED | OUTPATIENT
Start: 2021-06-01 | End: 2021-12-01

## 2021-06-01 RX ORDER — LISINOPRIL 10 MG/1
TABLET ORAL
Qty: 90 TABLET | Refills: 1 | Status: SHIPPED | OUTPATIENT
Start: 2021-06-01 | End: 2021-11-22

## 2021-06-02 DIAGNOSIS — I10 ESSENTIAL HYPERTENSION, BENIGN: ICD-10-CM

## 2021-06-03 RX ORDER — HYDROCHLOROTHIAZIDE 12.5 MG/1
TABLET ORAL
Qty: 90 TABLET | Refills: 0 | OUTPATIENT
Start: 2021-06-03

## 2021-06-08 ENCOUNTER — HOSPITAL ENCOUNTER (OUTPATIENT)
Dept: ULTRASOUND IMAGING | Facility: CLINIC | Age: 86
Discharge: HOME | End: 2021-06-08
Attending: PHYSICIAN ASSISTANT | Admitting: PHYSICIAN ASSISTANT
Payer: COMMERCIAL

## 2021-06-08 DIAGNOSIS — R23.8 REDNESS AND SWELLING OF LOWER LEG: ICD-10-CM

## 2021-06-08 DIAGNOSIS — I10 ESSENTIAL HYPERTENSION, BENIGN: ICD-10-CM

## 2021-06-08 DIAGNOSIS — M79.89 REDNESS AND SWELLING OF LOWER LEG: ICD-10-CM

## 2021-06-08 PROCEDURE — 93925 LOWER EXTREMITY STUDY: CPT

## 2021-07-05 ENCOUNTER — OFFICE VISIT (OUTPATIENT)
Dept: FAMILY MEDICINE | Facility: CLINIC | Age: 86
End: 2021-07-05
Payer: COMMERCIAL

## 2021-07-05 VITALS
OXYGEN SATURATION: 95 % | BODY MASS INDEX: 30.77 KG/M2 | RESPIRATION RATE: 20 BRPM | HEART RATE: 60 BPM | TEMPERATURE: 98 F | WEIGHT: 173.7 LBS | DIASTOLIC BLOOD PRESSURE: 62 MMHG | SYSTOLIC BLOOD PRESSURE: 114 MMHG

## 2021-07-05 DIAGNOSIS — I10 ESSENTIAL HYPERTENSION, BENIGN: Primary | ICD-10-CM

## 2021-07-05 DIAGNOSIS — M85.80 OSTEOPENIA, UNSPECIFIED LOCATION: ICD-10-CM

## 2021-07-05 DIAGNOSIS — M85.89 OTHER SPECIFIED DISORDERS OF BONE DENSITY AND STRUCTURE, MULTIPLE SITES: ICD-10-CM

## 2021-07-05 DIAGNOSIS — Z78.0 MENOPAUSE PRESENT: ICD-10-CM

## 2021-07-05 PROCEDURE — 99214 OFFICE O/P EST MOD 30 MIN: CPT | Performed by: PHYSICIAN ASSISTANT

## 2021-07-05 NOTE — PROGRESS NOTES
Assessment & Plan     Essential hypertension, benign  Continue with same dosing of medications.  Watch BP readings and let me know if getting too many low readings.  Follow up ok in 3-4 months.    Osteopenia, unspecified location  Agrees to screening.  - DX Hip/Pelvis/Spine; Future    Menopause present      Other specified disorders of bone density and structure, multiple sites     - DX Hip/Pelvis/Spine; Future      Return in about 4 months (around 11/5/2021) for Physical Exam- Wellness.    Papito Thayer PA-C  [facility] ENEDELIA Wills is a 89 year old who presents for the following health issues     HPI     Hypertension Follow-up      Do you check your blood pressure regularly outside of the clinic? Yes     Are you following a low salt diet? Yes    Are your blood pressures ever more than 140 on the top number (systolic) OR more   than 90 on the bottom number (diastolic), for example 140/90? No      How many servings of fruits and vegetables do you eat daily?  2-3    On average, how many sweetened beverages do you drink each day (Examples: soda, juice, sweet tea, etc.  Do NOT count diet or artificially sweetened beverages)?   0    How many days per week do you exercise enough to make your heart beat faster? none    How many minutes a day do you exercise enough to make your heart beat faster? None.     How many days per week do you miss taking your medication? 0    BP Readings from Last 6 Encounters:   07/05/21 114/62   05/26/21 130/80   04/29/21 125/59   04/05/21 (!) 148/61   01/14/21 128/54   12/16/20 130/72     Elma is here for follow up of blood pressure.  Last visit she felt that some were going too low at times.  We then decreased her hydrochlorothiazide in half to see how she responded.  Today she has some BP and weight readings along.  There are two that are a bit on the low side that she checked when feeling dizzy.  Otherwise normal readings.  She does think her  legs/feet feel a little worse.  Recent arterial US of LE's was normal.      Review of Systems   Constitutional, HEENT, cardiovascular, pulmonary, gi and gu systems are negative, except as otherwise noted.      Objective    /62 (BP Location: Right arm, Patient Position: Sitting, Cuff Size: Adult Regular)   Pulse 60   Temp 98  F (36.7  C) (Oral)   Resp 20   Wt 78.8 kg (173 lb 11.2 oz)   LMP 06/02/1981   SpO2 95%   BMI 30.77 kg/m    Body mass index is 30.77 kg/m .  Physical Exam   GENERAL: healthy, alert and no distress  RESP: lungs clear to auscultation - no rales, rhonchi or wheezes  CV: regular rate and rhythm, normal S1 S2, no S3 or S4, no murmur, click or rub, no peripheral edema and peripheral pulses strong  MS: no gross musculoskeletal defects noted, no edema  SKIN: no suspicious lesions or rashes  PSYCH: mentation appears normal, affect normal/bright

## 2021-07-05 NOTE — NURSING NOTE
"Chief Complaint   Patient presents with     Hypertension     Initial /62 (BP Location: Right arm, Patient Position: Sitting, Cuff Size: Adult Regular)   Pulse 60   Temp 98  F (36.7  C) (Oral)   Resp 20   Wt 78.8 kg (173 lb 11.2 oz)   LMP 06/02/1981   SpO2 95%   BMI 30.77 kg/m   Estimated body mass index is 30.77 kg/m  as calculated from the following:    Height as of 5/26/21: 1.6 m (5' 3\").    Weight as of this encounter: 78.8 kg (173 lb 11.2 oz).  BP completed using cuff size regular long right arm    Lisa Magill, CMA    "

## 2021-07-06 DIAGNOSIS — I10 ESSENTIAL HYPERTENSION, BENIGN: ICD-10-CM

## 2021-07-07 RX ORDER — HYDROCHLOROTHIAZIDE 12.5 MG/1
TABLET ORAL
Qty: 90 TABLET | Refills: 0
Start: 2021-07-07

## 2021-07-26 ENCOUNTER — ANCILLARY PROCEDURE (OUTPATIENT)
Dept: BONE DENSITY | Facility: CLINIC | Age: 86
End: 2021-07-26
Attending: PHYSICIAN ASSISTANT
Payer: COMMERCIAL

## 2021-07-26 DIAGNOSIS — M85.89 OTHER SPECIFIED DISORDERS OF BONE DENSITY AND STRUCTURE, MULTIPLE SITES: ICD-10-CM

## 2021-07-26 DIAGNOSIS — M85.80 OSTEOPENIA, UNSPECIFIED LOCATION: ICD-10-CM

## 2021-07-26 PROCEDURE — 77085 DXA BONE DENSITY AXL VRT FX: CPT | Performed by: INTERNAL MEDICINE

## 2021-08-03 DIAGNOSIS — M85.80 OSTEOPENIA, UNSPECIFIED LOCATION: Primary | ICD-10-CM

## 2021-08-03 RX ORDER — SILICONE ADHESIVE 1.5" X 3"
SHEET (EA) TOPICAL
COMMUNITY
Start: 2021-06-17

## 2021-08-03 RX ORDER — ALENDRONATE SODIUM 35 MG/1
35 TABLET ORAL
Qty: 12 TABLET | Refills: 1 | Status: SHIPPED | OUTPATIENT
Start: 2021-08-03

## 2021-08-08 DIAGNOSIS — I10 ESSENTIAL HYPERTENSION, BENIGN: ICD-10-CM

## 2021-08-09 RX ORDER — HYDROCHLOROTHIAZIDE 12.5 MG/1
TABLET ORAL
Qty: 90 TABLET | Refills: 0 | Status: SHIPPED | OUTPATIENT
Start: 2021-08-09 | End: 2021-11-22

## 2021-08-13 DIAGNOSIS — I10 ESSENTIAL HYPERTENSION, BENIGN: ICD-10-CM

## 2021-08-13 RX ORDER — HYDROCHLOROTHIAZIDE 25 MG/1
TABLET ORAL
Qty: 90 TABLET | Refills: 1 | OUTPATIENT
Start: 2021-08-13

## 2021-09-02 DIAGNOSIS — I10 ESSENTIAL HYPERTENSION, BENIGN: ICD-10-CM

## 2021-09-03 RX ORDER — HYDROCHLOROTHIAZIDE 12.5 MG/1
TABLET ORAL
Qty: 90 TABLET | Refills: 0
Start: 2021-09-03

## 2021-09-08 NOTE — TELEPHONE ENCOUNTER
Pt calls back.  She was supposed to take 2 of the 12.5 if her weight was up.  Otherwise she takes 12.5 along with lasix.  She can't remember who told her to do this.  See cardiology note of 4/29/21.     Will forward to Papito Thayer for advisal.  She is ok doing either, but she will need more 12.5 mg than what she is getting as she will run out sooner.

## 2021-09-09 NOTE — TELEPHONE ENCOUNTER
"This is what I could find from cardiology.  I don't think I discussed her lasix with her much.    \"1.  Take furosemide 10 mg daily instead of every other day.  This will be convenient for the patient and she does have lower extremity edema.  If her creatinine increases slightly, we will accept this.  2.  Continue hydrochlorothiazide 25 mg daily.  3.  No other changes to medications and all prescriptions renewed for a year.  4.  I will follow up with her in 6 months in the pulmonary hypertension clinic with an echocardiogram alone.  No indication for routine labs unless there has been interval change in diuretic dosage.\"    I ended up decreasing the hydrochlorothiazide to 12.5mg because of her complaints of dizziness and low BP readings at home.  It seems she should be taking the 10mg of lasix daily.  And she should have follow up with cardiology at the end of Oct.  If her BP is reading too high we could put the hydrochlorothiazide back up to 25mg.  Can we call back and ask how that has been looking.  We can ask about weight also.      Thanks,  Papito  "

## 2021-09-10 NOTE — TELEPHONE ENCOUNTER
Pt calls back. Advised.   She states she will take Furosemide 10 mg and hydrochlorothiazide 12.5 mg unless BP increases then will go up to 25 mg.

## 2021-10-05 ENCOUNTER — OFFICE VISIT (OUTPATIENT)
Dept: FAMILY MEDICINE | Facility: CLINIC | Age: 86
End: 2021-10-05
Payer: COMMERCIAL

## 2021-10-05 VITALS
BODY MASS INDEX: 29.12 KG/M2 | DIASTOLIC BLOOD PRESSURE: 68 MMHG | OXYGEN SATURATION: 92 % | WEIGHT: 164.4 LBS | SYSTOLIC BLOOD PRESSURE: 112 MMHG | RESPIRATION RATE: 18 BRPM | HEART RATE: 58 BPM | TEMPERATURE: 98.4 F

## 2021-10-05 DIAGNOSIS — M85.80 OSTEOPENIA, UNSPECIFIED LOCATION: ICD-10-CM

## 2021-10-05 DIAGNOSIS — I10 ESSENTIAL HYPERTENSION, BENIGN: Primary | ICD-10-CM

## 2021-10-05 DIAGNOSIS — Z23 HIGH PRIORITY FOR 2019-NCOV VACCINE: ICD-10-CM

## 2021-10-05 PROBLEM — Z85.3 H/O MALIGNANT NEOPLASM OF FEMALE BREAST: Status: ACTIVE | Noted: 2020-06-18

## 2021-10-05 PROCEDURE — 99214 OFFICE O/P EST MOD 30 MIN: CPT | Mod: 25 | Performed by: PHYSICIAN ASSISTANT

## 2021-10-05 PROCEDURE — 0004A PR COVID VAC PFIZER DIL RECON 30 MCG/0.3 ML IM: CPT | Performed by: PHYSICIAN ASSISTANT

## 2021-10-05 PROCEDURE — 91300 PR COVID VAC PFIZER DIL RECON 30 MCG/0.3 ML IM: CPT | Performed by: PHYSICIAN ASSISTANT

## 2021-10-05 ASSESSMENT — PAIN SCALES - GENERAL: PAINLEVEL: NO PAIN (0)

## 2021-10-05 NOTE — PATIENT INSTRUCTIONS
Take 1/2 atenolol also until you see cardiology next month.    Start Fosamax once weekly, if bothers your stomach let me know.

## 2021-10-05 NOTE — Clinical Note
Uh oh.  I'm sorry I'm so behind on charting.  Looks like we didn't get Elma's covid booster documented?  Can we look into this and then close her chart please?

## 2021-10-05 NOTE — PROGRESS NOTES
Assessment & Plan     Essential hypertension, benign  BP is controlled.  Not due to see cardiology until this spring.  Did not need refills at this time.  Can call if she does.    Osteopenia, unspecified location  She had not started Fosamax yet as she wanted to discuss this more.  After discussing the medication and reasons for it she is agreeable to trying.  If she has side effects she will let me know.    High priority for 2019-nCoV vaccine  Booster given today.  - COVID-19,PF,PFIZER      Return in about 1 month (around 11/5/2021) for for Specialty appointment.    Papito Thayer PA-C  [facility] ENEDELIA Wills is a 89 year old who presents for the following health issues:    HPI     Hypertension Follow-up      Do you check your blood pressure regularly outside of the clinic? Yes     Are you following a low salt diet? Yes    Are your blood pressures ever more than 140 on the top number (systolic) OR more   than 90 on the bottom number (diastolic), for example 140/90? No    Yesterday 97/47 at home and today 92/45.  Has not been light headed.  Cardiology next month again- has not set up this appointment yet.  Thinks she may be due for ECHO    Breathing- wondering still about the valve problem she has been told about.  Does not feel SOB today but does at times.    Fosamax- questions, has not starting taking yet.  Wondering about why and side effects.      How many servings of fruits and vegetables do you eat daily?  2-3    On average, how many sweetened beverages do you drink each day (Examples: soda, juice, sweet tea, etc.  Do NOT count diet or artificially sweetened beverages)?   1    How many days per week do you exercise enough to make your heart beat faster? 4    How many minutes a day do you exercise enough to make your heart beat faster? 9 or less    How many days per week do you miss taking your medication? 0      Review of Systems   Constitutional, HEENT,  cardiovascular, pulmonary, gi and gu systems are negative, except as otherwise noted.      Objective    /68 (BP Location: Right arm, Patient Position: Sitting, Cuff Size: Adult Regular)   Pulse 58   Temp 98.4  F (36.9  C) (Oral)   Resp 18   Wt 74.6 kg (164 lb 6.4 oz)   LMP 06/02/1981   SpO2 92%   Breastfeeding No   BMI 29.12 kg/m    Body mass index is 29.12 kg/m .  Physical Exam   GENERAL: healthy, alert and no distress  RESP: lungs clear to auscultation - no rales, rhonchi or wheezes  CV: regular rate and rhythm, normal S1 S2, no S3 or S4, no murmur- venous insufficiency skin changes present bilateral legs with 1+ edema  PSYCH: mentation appears normal, affect normal/bright

## 2021-10-29 ENCOUNTER — TELEPHONE (OUTPATIENT)
Dept: CARDIOLOGY | Facility: CLINIC | Age: 86
End: 2021-10-29

## 2021-10-29 ENCOUNTER — HOSPITAL ENCOUNTER (OUTPATIENT)
Dept: CARDIOLOGY | Facility: CLINIC | Age: 86
Discharge: HOME | End: 2021-10-29
Attending: INTERNAL MEDICINE | Admitting: INTERNAL MEDICINE
Payer: COMMERCIAL

## 2021-10-29 DIAGNOSIS — I10 ESSENTIAL HYPERTENSION, BENIGN: ICD-10-CM

## 2021-10-29 DIAGNOSIS — I36.1 NONRHEUMATIC TRICUSPID VALVE REGURGITATION: ICD-10-CM

## 2021-10-29 DIAGNOSIS — I27.20 PULMONARY HYPERTENSION (H): ICD-10-CM

## 2021-10-29 DIAGNOSIS — Z85.3 PERSONAL HISTORY OF MALIGNANT NEOPLASM OF BREAST: ICD-10-CM

## 2021-10-29 DIAGNOSIS — I48.3 TYPICAL ATRIAL FLUTTER (H): ICD-10-CM

## 2021-10-29 LAB — LVEF ECHO: NORMAL

## 2021-10-29 PROCEDURE — 93306 TTE W/DOPPLER COMPLETE: CPT | Mod: 26 | Performed by: INTERNAL MEDICINE

## 2021-10-29 PROCEDURE — 93306 TTE W/DOPPLER COMPLETE: CPT

## 2021-10-29 NOTE — TELEPHONE ENCOUNTER
Echo done 10-29-21  There is severe (4+) tricuspid regurgitation.  The visual ejection fraction is 55-60%.  Left ventricular systolic function is normal.  The right ventricle is mildly dilated.  The right ventricular systolic function is normal.  There is mild (1+) aortic regurgitation.    Next Dr. Lovett visit 11-22-21.  Last Dr. lovett visit 4-29-21.

## 2021-11-05 DIAGNOSIS — E78.2 MIXED HYPERLIPIDEMIA: ICD-10-CM

## 2021-11-09 RX ORDER — SIMVASTATIN 10 MG
TABLET ORAL
Qty: 100 TABLET | Refills: 6 | Status: SHIPPED | OUTPATIENT
Start: 2021-11-09 | End: 2023-03-02

## 2021-11-22 ENCOUNTER — CARE COORDINATION (OUTPATIENT)
Dept: CARDIOLOGY | Facility: CLINIC | Age: 86
End: 2021-11-22

## 2021-11-22 ENCOUNTER — OFFICE VISIT (OUTPATIENT)
Dept: CARDIOLOGY | Facility: CLINIC | Age: 86
End: 2021-11-22
Payer: COMMERCIAL

## 2021-11-22 VITALS
HEIGHT: 62 IN | HEART RATE: 52 BPM | BODY MASS INDEX: 29.61 KG/M2 | DIASTOLIC BLOOD PRESSURE: 78 MMHG | SYSTOLIC BLOOD PRESSURE: 130 MMHG | WEIGHT: 160.9 LBS | OXYGEN SATURATION: 94 %

## 2021-11-22 DIAGNOSIS — R06.02 SHORTNESS OF BREATH: ICD-10-CM

## 2021-11-22 DIAGNOSIS — Z85.3 PERSONAL HISTORY OF MALIGNANT NEOPLASM OF BREAST: ICD-10-CM

## 2021-11-22 DIAGNOSIS — I07.1 SEVERE TRICUSPID VALVE REGURGITATION: Primary | ICD-10-CM

## 2021-11-22 DIAGNOSIS — I48.21 PERMANENT ATRIAL FIBRILLATION (H): ICD-10-CM

## 2021-11-22 DIAGNOSIS — I10 ESSENTIAL HYPERTENSION, BENIGN: ICD-10-CM

## 2021-11-22 PROCEDURE — 99215 OFFICE O/P EST HI 40 MIN: CPT | Performed by: INTERNAL MEDICINE

## 2021-11-22 RX ORDER — LISINOPRIL 2.5 MG/1
10 TABLET ORAL EVERY EVENING
Qty: 30 TABLET | Refills: 4 | Status: SHIPPED | OUTPATIENT
Start: 2021-11-22 | End: 2022-01-17

## 2021-11-22 RX ORDER — FUROSEMIDE 20 MG
40 TABLET ORAL EVERY MORNING
Qty: 200 TABLET | Refills: 4 | Status: SHIPPED | OUTPATIENT
Start: 2021-11-22 | End: 2021-11-22

## 2021-11-22 RX ORDER — FUROSEMIDE 20 MG
40 TABLET ORAL EVERY MORNING
Qty: 200 TABLET | Refills: 4 | Status: SHIPPED | OUTPATIENT
Start: 2021-11-22

## 2021-11-22 RX ORDER — LISINOPRIL 2.5 MG/1
10 TABLET ORAL EVERY EVENING
Qty: 30 TABLET | Refills: 4 | Status: SHIPPED | OUTPATIENT
Start: 2021-11-22 | End: 2021-11-22

## 2021-11-22 RX ORDER — ATENOLOL 25 MG/1
25 TABLET ORAL DAILY
Qty: 100 TABLET | Refills: 4 | COMMUNITY
Start: 2021-11-22

## 2021-11-22 ASSESSMENT — MIFFLIN-ST. JEOR: SCORE: 1103.09

## 2021-11-22 NOTE — LETTER
11/22/2021    Miriam Bird [place]  [id number] [facility] [id number]    RE: Tanya Graham       Dear Colleague,    I had the pleasure of seeing Tanya Graham in the [facility].    Clinic visit note dictated. Dictation reference number - [id number]      Today's clinic visit entailed:  The following tests were independently interpreted by me as noted in my documentation: Labs, ECG, transthoracic echocardiogram images.  Discussion of management or test interpretation with external physician/other qualified healthcare professional/appropriate source - CV surgeon, Dr. Lillie Chiu.  Prescription drug management  45 minutes spent on the date of the encounter doing chart review, history and exam, documentation and further activities per the note  Provider  Link to [facility] Help Grid     The level of medical decision making during this visit was of high complexity.        REVIEW OF SYSTEMS:  A comprehensive 10-point review of systems was completed and the pertinent positives are documented in the history of present illness.    Skin:  Positive for     Eyes:  Positive for glasses  ENT:  Negative    Respiratory:  Positive for shortness of breath;dyspnea on exertion  Cardiovascular:    Positive for;edema;fatigue  Gastroenterology: Negative    Genitourinary:  Positive for nocturia  Musculoskeletal:  Negative    Neurologic:  Negative    Psychiatric:  Positive for excessive stress;anxiety;depression  Heme/Lymph/Imm:  Negative    Endocrine:  Negative      CURRENT MEDICATIONS:  Current Outpatient Medications   Medication Sig Dispense Refill     alendronate (FOSAMAX) 35 MG tablet Take 1 tablet (35 mg) by mouth every 7 days 12 tablet 1     atenolol (TENORMIN) 25 MG tablet Take 1 tablet (25 mg) by mouth daily 100 tablet 4     cholecalciferol (VITAMIN-D) 1000 UNIT capsule Take 1 capsule by mouth daily. Patient takes (2) capsules daily.       ELIQUIS ANTICOAGULANT 5 MG  tablet TAKE 1 TABLET BY MOUTH TWICE A  tablet 1     furosemide (LASIX) 20 MG tablet Take 2 tablets (40 mg) by mouth every morning 200 tablet 4     lisinopril (ZESTRIL) 2.5 MG tablet Take 4 tablets (10 mg) by mouth every evening 30 tablet 4     meclizine (ANTIVERT) 25 MG tablet Take 1 tablet (25 mg) by mouth 3 times daily as needed for dizziness 20 tablet 1     Multiple Vitamins-Minerals (OCUVITE-LUTEIN PO) Take  by mouth. Patient takes 2 tabs daily.       Niacinamide 500 MG TBCR Take 1 tablet by mouth daily To prevent skin cancer, per dermatologist 90 tablet 3     ondansetron (ZOFRAN ODT) 4 MG ODT tab Take 1 tablet (4 mg) by mouth every 6 hours as needed 20 tablet 0     ondansetron (ZOFRAN) 4 MG tablet Take 1 tablet (4 mg) by mouth every 8 hours as needed for nausea 20 tablet 1     simvastatin (ZOCOR) 10 MG tablet TAKE 1 TABLET BY MOUTH AT BEDTIME 100 tablet 6     sodium chloride (EMMA 128) 5 % ophthalmic solution INSTLL 1 DROP INTO LEFT EYE 4 TIMES A DAY FOR 90 DAYS           ALLERGIES:  Allergies   Allergen Reactions     Other [No Clinical Screening - See Comments]      Vomits from IV narcotics       PAST MEDICAL HISTORY:    Past Medical History:   Diagnosis Date     Basal cell carcinoma      Benign neoplasm of colon 04/03/2008    polyps, 2 and 3     Diverticulosis of colon (without mention of hemorrhage) 08/2007    noted on screening colonoscopy     Essential hypertension, benign     Hypertension, Benign     Malignant neoplasm of breast (female), unspecified site 03/2007    infiltrating ductal CA left breast     Mixed hyperlipidemia     Hyperlipidemia     NONSPECIFIC MEDICAL HISTORY 1971    left carotid aneurysm, surgical repair     NONSPECIFIC MEDICAL HISTORY 12/04/2002    osteopenia '04, nl DEXA '07     Other malignant neoplasm of other specified sites of skin     skin cancer removed     Other ventral hernia without mention of obstruction or gangrene     surgical repair     Postmastectomy lymphedema  syndrome 2007     Unspecified intestinal obstruction 07/2007    partial small bowel obstruction into ventral hernia       PAST SURGICAL HISTORY:    Past Surgical History:   Procedure Laterality Date     BIOPSY  12/16/2020    On left side of head      BIOPSY OF BREAST, NEEDLE CORE  3/07    left breast, infiltrating ductal CA     BREAST LUMPECTOMY, RT/LT  4/07    left breast with sentienl node bx     HYSTERECTOMY, RICH      RICH/BSO-due to prolapsed bladder?     LAP VENTRAL HERNIA REPAIR  5/07    ventral herniorrhaphy     SURGICAL HISTORY OF -   1971    left carotid aneurysm     SURGICAL HISTORY OF -   2006    bilat cataract     SURGICAL HISTORY OF -   2000    bladder repair     SURGICAL HISTORY OF -   2002    ventral hernia repair       FAMILY HISTORY:    Family History   Problem Relation Age of Onset     C.A.D. Mother      Cancer Father         unknown primary cancer     Cancer Brother         prostate     Diabetes Daughter      Cardiovascular Daughter         MI age 37,hx heart transplant     Gastrointestinal Disease Daughter         celiac sprue     Connective Tissue Disorder Daughter         fibromyalgia       SOCIAL HISTORY:    Social History     Socioeconomic History     Marital status:      Spouse name: Hollis     Number of children: 2     Years of education: None     Highest education level: None   Occupational History     Employer: RETIRED   Tobacco Use     Smoking status: Never Smoker     Smokeless tobacco: Never Used   Substance and Sexual Activity     Alcohol use: No     Drug use: No     Sexual activity: Yes     Partners: Male     Comment: postmenopausal   Other Topics Concern     Parent/sibling w/ CABG, MI or angioplasty before 65F 55M? Not Asked   Social History Narrative     None     Social Determinants of Health     Financial Resource Strain: Low Risk      Difficulty of Paying Living Expenses: Not hard at all   Food Insecurity: No Food Insecurity     Worried About Running Out of Food in the Last  "Year: Never true     Ran Out of Food in the Last Year: Never true   Transportation Needs: No Transportation Needs     Lack of Transportation (Medical): No     Lack of Transportation (Non-Medical): No   Physical Activity: Not on file   Stress: Not on file   Social Connections: Not on file   Intimate Partner Violence: Not At Risk     Fear of Current or Ex-Partner: No     Emotionally Abused: No     Physically Abused: No     Sexually Abused: No   Housing Stability: Not on file       PHYSICAL EXAM:    Vitals: /78 (BP Location: Right arm, Patient Position: Sitting, Cuff Size: Adult Regular)   Pulse 52   Ht 1.575 m (5' 2\")   Wt 73 kg (160 lb 14.4 oz)   LMP 06/02/1981   SpO2 94%   BMI 29.43 kg/m    Wt Readings from Last 5 Encounters:   11/22/21 73 kg (160 lb 14.4 oz)   10/05/21 74.6 kg (164 lb 6.4 oz)   07/05/21 78.8 kg (173 lb 11.2 oz)   05/26/21 77.6 kg (171 lb)   04/29/21 77.1 kg (170 lb)         Encounter Diagnoses   Name Primary?     Severe tricuspid valve regurgitation Yes     Permanent atrial fibrillation (H)      Essential hypertension, benign      Personal history of malignant neoplasm of breast        Orders Placed This Encounter   Procedures     CARDIOVASCULAR SURGERY REFERRAL                 Service Date: 11/22/2021    PRIMARY CARE PROVIDER:  Miriam Bird MD    CARDIOLOGY ASHLEY:  Mare Palomares NP    REASON FOR VISIT:  Followup of functional tricuspid valve regurgitation with secondary pulmonary hypertension in the context of chronic atrial fibrillation.    HISTORY OF PRESENT ILLNESS:    This is an elderly 89-year-old  lady, lives on her own in a rambler, has a small dog,  for the last decade, walks with a cane, still driving.  Little hard of hearing, but mentally astute.    Her history is significant for:  1.  Chronic atrial fibrillation diagnosed in 06/2020.  It is likely that she has had it for years prior to that.  She is rate controlled on atenolol 25 mg and Eliquis " anticoagulation.  2.  Benign essential hypertension, well controlled.  3.  Status post left carotid artery aneurysm surgical repair in 1971.  4.  Left breast cancer, status post surgery.  5.  Severe significant tricuspid valve regurgitation.    I had seen Ms. Graham last approximately 9 months ago when she was referred to the [facility]pertension [facility] due to significant TR and elevated RVSP.  Her echocardiogram had showed massive biatrial enlargement, mildly dilated right ventricle with normal systolic function and estimated RVSP of 43.  LV function is preserved.    At that time, she was doing very well, still driving, physically active.  I had put her on low-dose Furosemide 20 mg daily.    However, in the last few months, she has noticed more bilateral lower extremity edema.  She appears less agile than I remember from a few months ago.  She denies dyspnea or orthopnea.  She still does her own shopping and is able to walk and in the shopping market using a cart.  She has not had any hospitalizations.    DIAGNOSTIC DATA:    Her echocardiogram is concerning.  I personally reviewed the images with the patient.  It shows an interval worsening of tricuspid valve regurgitation.  Both her atria are massively enlarged resulting in failure of coaptation of the tricuspid valve leaflets and severe (4+) tricuspid valve regurgitation.  The right ventricle is moderately dilated and systolic function is mildly reduced with a TAPSE of 1.6 cm and tissue Doppler systolic velocity of 8 cm/s.  The TR jet is wide, covers at least 60% of the massively enlarged right atrium, the Doppler profile is dense and triangular.  Her IVC is not dilated (1.7 cm) and does collapse with respiratory variation.  There is systolic flow reversal in the hepatic veins (suboptimal Doppler profile).  There is mild to moderate mitral valve regurgitation.  LV function is preserved.  Aortic valve was trileaflet and sclerotic without significant stenosis, mild  "regurgitation.    Labs:  Her creatinine is 0.8 with an estimated GFR of 58, potassium is 3.7.  Normal CBC with a hemoglobin of 14.8.      Her last EKG shows rate controlled atrial fibrillation at 58 BPM.    PHYSICAL EXAMINATION:    GENERAL:  Appears stated age, hard of hearing, but mentally alert.  CARDIOVASCULAR:  JVP is elevated up to the angle of the jaw with prominent V waves.  She does not have a parasternal heave.  Heart sounds are irregularly irregular.  TR murmur is not audible due to the presence of wide open TR.  No S3.  LUNGS:  Clear to auscultation.  EXTREMITIES:  She has 2+ pitting edema of both lower extremities extending up to her knees.  She has skin changes suggestive of chronic venostasis.    DIAGNOSES:    1.  Severe functional tricuspid valve regurgitation with mildly decreased right ventricular systolic function, NYHA class II-III.  2.  Massive biatrial enlargement.  3.  Persistent atrial fibrillation.    ASSESSMENT:   Elma is clinically deteriorating.  Even in the brief span of 10 months, she visibly appears more aged and frail.  Her blood pressure has dropped recently and her primary provider has had to appropriately cut back on the atenolol from 50 mg daily to 25 mg daily.  On echocardiogram, her tricuspid valve regurgitation is clearly worse, the right ventricle is at least moderately dilated (note report says mildly dilated) and the systolic function is starting to deteriorate as well.    Given her age of 89 years and frailty, I suspect she will be high-risk candidate for surgery.  Additionally due to the massive atrial enlargement, I am not sure this can be fixed with valve repair alone and she might need valve replacement.  Although she has known about this diagnosis on multiple visits over the last years, she states that she states \"I didn't know things were this bad.\"  She still lives independently and wants \"everything done,\" I tried to have a compassionate conversation about goals of " care, but I do not believe she is read to discuss a conservative approach at this time.    PLAN:    1.  We will optimize her diuretic therapy.  -- Stop hydrochlorothiazide.  -- Decrease lisinopril from 10 mg daily to 2.5 mg daily.  Take in the evening.  -- Agree with a lower dose atenolol 25 mg daily.  -- Increase furosemide from 10 mg daily to 40 mg daily.    2.  Prescriptions renewed.    3.  Check basic metabolic panel and NT-proBNP in 1 week.    4.  I will refer her to see my CV surgical partner, Dr. Lillie Chiu, at [facility].  I have requested that she take one of her daughters with her.  As documented above, patient wants to explore all options, despite me having goals of care conversation with her.  I have directly communicated with Dr. Chiu regarding this patient.    5.  Follow up with Mare Palomares in 4 months.    Dia Vidal MD        D: 2021   T: 2021   MT: TAI    Name:     SANIA SANTANA  MRN:      [id number]        Account:      [id number]   :      [id number]           Service Date: 2021       Document: [id number]        Thank you for allowing me to participate in the care of your patient.      Sincerely,     Dia Vidal MD     [facility]  cc:   No referring provider defined for this encounter.

## 2021-11-22 NOTE — PATIENT INSTRUCTIONS
MEDICATION CHANGES:  1.  Stop hydrochlorothiazide.  2.  Decrease lisinopril from 10 mg daily to 2.5 mg daily.  Take in the evening.  3.  Agree with decreasing atenolol from 50 mg daily to 25 mg daily.  4.  Instead increase furosemide from 10 mg daily to 40 mg daily.    TESTING AND FOLLOW-UP:  1.  Check basic metabolic panel and NT proBNP in 1 week.  2.  Refer to CV surgeon Dr. Lillie Chiu.  3.  Follow-up with Mare Palomares in 4 months.  4.  Fluid restriction to 1800 mL daily.    If you have any questions or concerns, please contact my nurses at [phone] or Laurence at [phone].

## 2021-11-22 NOTE — LETTER
11/22/2021       RE: Tanya Graham  [address]  [facility] [address]     Dear Colleague,    Thank you for referring your patient, Tanya Graham, to the [facility]. Please see a copy of my visit note below.    Clinic visit note dictated. Dictation reference number - [id number]      Today's clinic visit entailed:  The following tests were independently interpreted by me as noted in my documentation: Labs, ECG, transthoracic echocardiogram images.  Discussion of management or test interpretation with external physician/other qualified healthcare professional/appropriate source - CV surgeon, Dr. Lillie Chiu.  Prescription drug management  45 minutes spent on the date of the encounter doing chart review, history and exam, documentation and further activities per the note  Provider  Link to [facility] Help Grid     The level of medical decision making during this visit was of high complexity.        REVIEW OF SYSTEMS:  A comprehensive 10-point review of systems was completed and the pertinent positives are documented in the history of present illness.    Skin:  Positive for     Eyes:  Positive for glasses  ENT:  Negative    Respiratory:  Positive for shortness of breath;dyspnea on exertion  Cardiovascular:    Positive for;edema;fatigue  Gastroenterology: Negative    Genitourinary:  Positive for nocturia  Musculoskeletal:  Negative    Neurologic:  Negative    Psychiatric:  Positive for excessive stress;anxiety;depression  Heme/Lymph/Imm:  Negative    Endocrine:  Negative      CURRENT MEDICATIONS:  Current Outpatient Medications   Medication Sig Dispense Refill     alendronate (FOSAMAX) 35 MG tablet Take 1 tablet (35 mg) by mouth every 7 days 12 tablet 1     atenolol (TENORMIN) 25 MG tablet Take 1 tablet (25 mg) by mouth daily 100 tablet 4     cholecalciferol (VITAMIN-D) 1000 UNIT capsule Take 1 capsule by mouth daily.  Patient takes (2) capsules daily.       ELIQUIS ANTICOAGULANT 5 MG tablet TAKE 1 TABLET BY MOUTH TWICE A  tablet 1     furosemide (LASIX) 20 MG tablet Take 2 tablets (40 mg) by mouth every morning 200 tablet 4     lisinopril (ZESTRIL) 2.5 MG tablet Take 4 tablets (10 mg) by mouth every evening 30 tablet 4     meclizine (ANTIVERT) 25 MG tablet Take 1 tablet (25 mg) by mouth 3 times daily as needed for dizziness 20 tablet 1     Multiple Vitamins-Minerals (OCUVITE-LUTEIN PO) Take  by mouth. Patient takes 2 tabs daily.       Niacinamide 500 MG TBCR Take 1 tablet by mouth daily To prevent skin cancer, per dermatologist 90 tablet 3     ondansetron (ZOFRAN ODT) 4 MG ODT tab Take 1 tablet (4 mg) by mouth every 6 hours as needed 20 tablet 0     ondansetron (ZOFRAN) 4 MG tablet Take 1 tablet (4 mg) by mouth every 8 hours as needed for nausea 20 tablet 1     simvastatin (ZOCOR) 10 MG tablet TAKE 1 TABLET BY MOUTH AT BEDTIME 100 tablet 6     sodium chloride (EMMA 128) 5 % ophthalmic solution INSTLL 1 DROP INTO LEFT EYE 4 TIMES A DAY FOR 90 DAYS           ALLERGIES:  Allergies   Allergen Reactions     Other [No Clinical Screening - See Comments]      Vomits from IV narcotics       PAST MEDICAL HISTORY:    Past Medical History:   Diagnosis Date     Basal cell carcinoma      Benign neoplasm of colon 04/03/2008    polyps, 2 and 3     Diverticulosis of colon (without mention of hemorrhage) 08/2007    noted on screening colonoscopy     Essential hypertension, benign     Hypertension, Benign     Malignant neoplasm of breast (female), unspecified site 03/2007    infiltrating ductal CA left breast     Mixed hyperlipidemia     Hyperlipidemia     NONSPECIFIC MEDICAL HISTORY 1971    left carotid aneurysm, surgical repair     NONSPECIFIC MEDICAL HISTORY 12/04/2002    osteopenia '04, nl DEXA '07     Other malignant neoplasm of other specified sites of skin     skin cancer removed     Other ventral hernia without mention of obstruction  or gangrene     surgical repair     Postmastectomy lymphedema syndrome 2007     Unspecified intestinal obstruction 07/2007    partial small bowel obstruction into ventral hernia       PAST SURGICAL HISTORY:    Past Surgical History:   Procedure Laterality Date     BIOPSY  12/16/2020    On left side of head      BIOPSY OF BREAST, NEEDLE CORE  3/07    left breast, infiltrating ductal CA     BREAST LUMPECTOMY, RT/LT  4/07    left breast with sentienl node bx     HYSTERECTOMY, RICH      RICH/BSO-due to prolapsed bladder?     LAP VENTRAL HERNIA REPAIR  5/07    ventral herniorrhaphy     SURGICAL HISTORY OF -   1971    left carotid aneurysm     SURGICAL HISTORY OF -   2006    bilat cataract     SURGICAL HISTORY OF -   2000    bladder repair     SURGICAL HISTORY OF -   2002    ventral hernia repair       FAMILY HISTORY:    Family History   Problem Relation Age of Onset     C.A.D. Mother      Cancer Father         unknown primary cancer     Cancer Brother         prostate     Diabetes Daughter      Cardiovascular Daughter         MI age 37,hx heart transplant     Gastrointestinal Disease Daughter         celiac sprue     Connective Tissue Disorder Daughter         fibromyalgia       SOCIAL HISTORY:    Social History     Socioeconomic History     Marital status:      Spouse name: Hollis     Number of children: 2     Years of education: None     Highest education level: None   Occupational History     Employer: RETIRED   Tobacco Use     Smoking status: Never Smoker     Smokeless tobacco: Never Used   Substance and Sexual Activity     Alcohol use: No     Drug use: No     Sexual activity: Yes     Partners: Male     Comment: postmenopausal   Other Topics Concern     Parent/sibling w/ CABG, MI or angioplasty before 65F 55M? Not Asked   Social History Narrative     None     Social Determinants of Health     Financial Resource Strain: Low Risk      Difficulty of Paying Living Expenses: Not hard at all   Food Insecurity: No  "Food Insecurity     Worried About Running Out of Food in the Last Year: Never true     Ran Out of Food in the Last Year: Never true   Transportation Needs: No Transportation Needs     Lack of Transportation (Medical): No     Lack of Transportation (Non-Medical): No   Physical Activity: Not on file   Stress: Not on file   Social Connections: Not on file   Intimate Partner Violence: Not At Risk     Fear of Current or Ex-Partner: No     Emotionally Abused: No     Physically Abused: No     Sexually Abused: No   Housing Stability: Not on file       PHYSICAL EXAM:    Vitals: /78 (BP Location: Right arm, Patient Position: Sitting, Cuff Size: Adult Regular)   Pulse 52   Ht 1.575 m (5' 2\")   Wt 73 kg (160 lb 14.4 oz)   LMP 06/02/1981   SpO2 94%   BMI 29.43 kg/m    Wt Readings from Last 5 Encounters:   11/22/21 73 kg (160 lb 14.4 oz)   10/05/21 74.6 kg (164 lb 6.4 oz)   07/05/21 78.8 kg (173 lb 11.2 oz)   05/26/21 77.6 kg (171 lb)   04/29/21 77.1 kg (170 lb)         Encounter Diagnoses   Name Primary?     Severe tricuspid valve regurgitation Yes     Permanent atrial fibrillation (H)      Essential hypertension, benign      Personal history of malignant neoplasm of breast        Orders Placed This Encounter   Procedures     CARDIOVASCULAR SURGERY REFERRAL                 Service Date: 11/22/2021    PRIMARY CARE PROVIDER:  Miriam Bird MD    CARDIOLOGY ASHLEY:  Mare Palomares NP    REASON FOR VISIT:  Followup of functional tricuspid valve regurgitation with secondary pulmonary hypertension in the context of chronic atrial fibrillation.    HISTORY OF PRESENT ILLNESS:    This is an elderly 89-year-old  lady, lives on her own in a rambler, has a small dog,  for the last decade, walks with a cane, still driving.  Little hard of hearing, but mentally astute.    Her history is significant for:  1.  Chronic atrial fibrillation diagnosed in 06/2020.  It is likely that she has had it for years prior to " that.  She is rate controlled on atenolol 25 mg and Eliquis anticoagulation.  2.  Benign essential hypertension, well controlled.  3.  Status post left carotid artery aneurysm surgical repair in 1971.  4.  Left breast cancer, status post surgery.  5.  Severe significant tricuspid valve regurgitation.    I had seen Ms. Graham last approximately 9 months ago when she was referred to the [facility] due to significant TR and elevated RVSP.  Her echocardiogram had showed massive biatrial enlargement, mildly dilated right ventricle with normal systolic function and estimated RVSP of 43.  LV function is preserved.    At that time, she was doing very well, still driving, physically active.  I had put her on low-dose Furosemide 20 mg daily.    However, in the last few months, she has noticed more bilateral lower extremity edema.  She appears less agile than I remember from a few months ago.  She denies dyspnea or orthopnea.  She still does her own shopping and is able to walk and in the shopping market using a cart.  She has not had any hospitalizations.    DIAGNOSTIC DATA:    Her echocardiogram is concerning.  I personally reviewed the images with the patient.  It shows an interval worsening of tricuspid valve regurgitation.  Both her atria are massively enlarged resulting in failure of coaptation of the tricuspid valve leaflets and severe (4+) tricuspid valve regurgitation.  The right ventricle is moderately dilated and systolic function is mildly reduced with a TAPSE of 1.6 cm and tissue Doppler systolic velocity of 8 cm/s.  The TR jet is wide, covers at least 60% of the massively enlarged right atrium, the Doppler profile is dense and triangular.  Her IVC is not dilated (1.7 cm) and does collapse with respiratory variation.  There is systolic flow reversal in the hepatic veins (suboptimal Doppler profile).  There is mild to moderate mitral valve regurgitation.  LV function is preserved.  Aortic valve was  "trileaflet and sclerotic without significant stenosis, mild regurgitation.    Labs:  Her creatinine is 0.8 with an estimated GFR of 58, potassium is 3.7.  Normal CBC with a hemoglobin of 14.8.      Her last EKG shows rate controlled atrial fibrillation at 58 BPM.    PHYSICAL EXAMINATION:    GENERAL:  Appears stated age, hard of hearing, but mentally alert.  CARDIOVASCULAR:  JVP is elevated up to the angle of the jaw with prominent V waves.  She does not have a parasternal heave.  Heart sounds are irregularly irregular.  TR murmur is not audible due to the presence of wide open TR.  No S3.  LUNGS:  Clear to auscultation.  EXTREMITIES:  She has 2+ pitting edema of both lower extremities extending up to her knees.  She has skin changes suggestive of chronic venostasis.    DIAGNOSES:    1.  Severe functional tricuspid valve regurgitation with mildly decreased right ventricular systolic function, NYHA class II-III.  2.  Massive biatrial enlargement.  3.  Persistent atrial fibrillation.    ASSESSMENT:   Elma is clinically deteriorating.  Even in the brief span of 10 months, she visibly appears more aged and frail.  Her blood pressure has dropped recently and her primary provider has had to appropriately cut back on the atenolol from 50 mg daily to 25 mg daily.  On echocardiogram, her tricuspid valve regurgitation is clearly worse, the right ventricle is at least moderately dilated (note report says mildly dilated) and the systolic function is starting to deteriorate as well.    Given her age of 89 years and frailty, I suspect she will be high-risk candidate for surgery.  Additionally due to the massive atrial enlargement, I am not sure this can be fixed with valve repair alone and she might need valve replacement.  Although she has known about this diagnosis on multiple visits over the last years, she states that she states \"I didn't know things were this bad.\"  She still lives independently and wants \"everything done,\" I " tried to have a compassionate conversation about goals of care, but I do not believe she is read to discuss a conservative approach at this time.    PLAN:    1.  We will optimize her diuretic therapy.  -- Stop hydrochlorothiazide.  -- Decrease lisinopril from 10 mg daily to 2.5 mg daily.  Take in the evening.  -- Agree with a lower dose atenolol 25 mg daily.  -- Increase furosemide from 10 mg daily to 40 mg daily.    2.  Prescriptions renewed.    3.  Check basic metabolic panel and NT-proBNP in 1 week.    4.  I will refer her to see my CV surgical partner, Dr. Lillie Chiu, at [facility].  I have requested that she take one of her daughters with her.  As documented above, patient wants to explore all options, despite me having goals of care conversation with her.  I have directly communicated with Dr. Chiu regarding this patient.    5.  Follow up with Mare Palomares in 4 months.      Dia Vidal MD        D: 2021   T: 2021   MT: TAI    Name:     SANIA SANTANA  MRN:      [id number]        Account:      [id number]   :      [id number]           Service Date: 2021     Document: [id number]

## 2021-11-22 NOTE — PROGRESS NOTES
Review of AVS with patient post visit with DR Vidal.   See instructions with multiple medication changes.   Tanya reports understanding of plan and instructions.  Scheduling printed copy of AVS for patient. Patient would like to have labs in 1 week at her Primary Care in [place] and will call them for appointment. Orders are in [facility]. Tanya would like to discuss with her daughter before scheduling visit with Dr Chiu. Patient will call back to schedule as she also reports will have to obtain transportation to [place] for the visit.  Laurence Gallagher RN 11/22/21 4:32 PM

## 2021-11-22 NOTE — PROGRESS NOTES
Clinic visit note dictated. Dictation reference number - [id number]      Today's clinic visit entailed:  The following tests were independently interpreted by me as noted in my documentation: Labs, ECG, transthoracic echocardiogram images.  Discussion of management or test interpretation with external physician/other qualified healthcare professional/appropriate source - CV surgeon, Dr. Lillie Chiu.  Prescription drug management  45 minutes spent on the date of the encounter doing chart review, history and exam, documentation and further activities per the note  Provider  Link to [facility] Help Grid     The level of medical decision making during this visit was of high complexity.        REVIEW OF SYSTEMS:  A comprehensive 10-point review of systems was completed and the pertinent positives are documented in the history of present illness.    Skin:  Positive for     Eyes:  Positive for glasses  ENT:  Negative    Respiratory:  Positive for shortness of breath;dyspnea on exertion  Cardiovascular:    Positive for;edema;fatigue  Gastroenterology: Negative    Genitourinary:  Positive for nocturia  Musculoskeletal:  Negative    Neurologic:  Negative    Psychiatric:  Positive for excessive stress;anxiety;depression  Heme/Lymph/Imm:  Negative    Endocrine:  Negative      CURRENT MEDICATIONS:  Current Outpatient Medications   Medication Sig Dispense Refill     alendronate (FOSAMAX) 35 MG tablet Take 1 tablet (35 mg) by mouth every 7 days 12 tablet 1     atenolol (TENORMIN) 25 MG tablet Take 1 tablet (25 mg) by mouth daily 100 tablet 4     cholecalciferol (VITAMIN-D) 1000 UNIT capsule Take 1 capsule by mouth daily. Patient takes (2) capsules daily.       ELIQUIS ANTICOAGULANT 5 MG tablet TAKE 1 TABLET BY MOUTH TWICE A  tablet 1     furosemide (LASIX) 20 MG tablet Take 2 tablets (40 mg) by mouth every morning 200 tablet 4     lisinopril (ZESTRIL) 2.5 MG tablet Take 4 tablets (10 mg) by mouth every evening 30 tablet 4      meclizine (ANTIVERT) 25 MG tablet Take 1 tablet (25 mg) by mouth 3 times daily as needed for dizziness 20 tablet 1     Multiple Vitamins-Minerals (OCUVITE-LUTEIN PO) Take  by mouth. Patient takes 2 tabs daily.       Niacinamide 500 MG TBCR Take 1 tablet by mouth daily To prevent skin cancer, per dermatologist 90 tablet 3     ondansetron (ZOFRAN ODT) 4 MG ODT tab Take 1 tablet (4 mg) by mouth every 6 hours as needed 20 tablet 0     ondansetron (ZOFRAN) 4 MG tablet Take 1 tablet (4 mg) by mouth every 8 hours as needed for nausea 20 tablet 1     simvastatin (ZOCOR) 10 MG tablet TAKE 1 TABLET BY MOUTH AT BEDTIME 100 tablet 6     sodium chloride (EMMA 128) 5 % ophthalmic solution INSTLL 1 DROP INTO LEFT EYE 4 TIMES A DAY FOR 90 DAYS           ALLERGIES:  Allergies   Allergen Reactions     Other [No Clinical Screening - See Comments]      Vomits from IV narcotics       PAST MEDICAL HISTORY:    Past Medical History:   Diagnosis Date     Basal cell carcinoma      Benign neoplasm of colon 04/03/2008    polyps, 2 and 3     Diverticulosis of colon (without mention of hemorrhage) 08/2007    noted on screening colonoscopy     Essential hypertension, benign     Hypertension, Benign     Malignant neoplasm of breast (female), unspecified site 03/2007    infiltrating ductal CA left breast     Mixed hyperlipidemia     Hyperlipidemia     NONSPECIFIC MEDICAL HISTORY 1971    left carotid aneurysm, surgical repair     NONSPECIFIC MEDICAL HISTORY 12/04/2002    osteopenia '04, nl DEXA '07     Other malignant neoplasm of other specified sites of skin     skin cancer removed     Other ventral hernia without mention of obstruction or gangrene     surgical repair     Postmastectomy lymphedema syndrome 2007     Unspecified intestinal obstruction 07/2007    partial small bowel obstruction into ventral hernia       PAST SURGICAL HISTORY:    Past Surgical History:   Procedure Laterality Date     BIOPSY  12/16/2020    On left side of head       BIOPSY OF BREAST, NEEDLE CORE  3/07    left breast, infiltrating ductal CA     BREAST LUMPECTOMY, RT/LT  4/07    left breast with sentienl node bx     HYSTERECTOMY, RICH      RICH/BSO-due to prolapsed bladder?     LAP VENTRAL HERNIA REPAIR  5/07    ventral herniorrhaphy     SURGICAL HISTORY OF -   1971    left carotid aneurysm     SURGICAL HISTORY OF -   2006    bilat cataract     SURGICAL HISTORY OF -   2000    bladder repair     SURGICAL HISTORY OF -   2002    ventral hernia repair       FAMILY HISTORY:    Family History   Problem Relation Age of Onset     C.A.D. Mother      Cancer Father         unknown primary cancer     Cancer Brother         prostate     Diabetes Daughter      Cardiovascular Daughter         MI age 37,hx heart transplant     Gastrointestinal Disease Daughter         celiac sprue     Connective Tissue Disorder Daughter         fibromyalgia       SOCIAL HISTORY:    Social History     Socioeconomic History     Marital status:      Spouse name: Hollis     Number of children: 2     Years of education: None     Highest education level: None   Occupational History     Employer: RETIRED   Tobacco Use     Smoking status: Never Smoker     Smokeless tobacco: Never Used   Substance and Sexual Activity     Alcohol use: No     Drug use: No     Sexual activity: Yes     Partners: Male     Comment: postmenopausal   Other Topics Concern     Parent/sibling w/ CABG, MI or angioplasty before 65F 55M? Not Asked   Social History Narrative     None     Social Determinants of Health     Financial Resource Strain: Low Risk      Difficulty of Paying Living Expenses: Not hard at all   Food Insecurity: No Food Insecurity     Worried About Running Out of Food in the Last Year: Never true     Ran Out of Food in the Last Year: Never true   Transportation Needs: No Transportation Needs     Lack of Transportation (Medical): No     Lack of Transportation (Non-Medical): No   Physical Activity: Not on file   Stress: Not on  "file   Social Connections: Not on file   Intimate Partner Violence: Not At Risk     Fear of Current or Ex-Partner: No     Emotionally Abused: No     Physically Abused: No     Sexually Abused: No   Housing Stability: Not on file       PHYSICAL EXAM:    Vitals: /78 (BP Location: Right arm, Patient Position: Sitting, Cuff Size: Adult Regular)   Pulse 52   Ht 1.575 m (5' 2\")   Wt 73 kg (160 lb 14.4 oz)   LMP 06/02/1981   SpO2 94%   BMI 29.43 kg/m    Wt Readings from Last 5 Encounters:   11/22/21 73 kg (160 lb 14.4 oz)   10/05/21 74.6 kg (164 lb 6.4 oz)   07/05/21 78.8 kg (173 lb 11.2 oz)   05/26/21 77.6 kg (171 lb)   04/29/21 77.1 kg (170 lb)         Encounter Diagnoses   Name Primary?     Severe tricuspid valve regurgitation Yes     Permanent atrial fibrillation (H)      Essential hypertension, benign      Personal history of malignant neoplasm of breast        Orders Placed This Encounter   Procedures     CARDIOVASCULAR SURGERY REFERRAL               "

## 2021-11-22 NOTE — PROGRESS NOTES
Service Date: 11/22/2021    PRIMARY CARE PROVIDER:  Miriam Bird MD    CARDIOLOGY ASHLEY:  Mare Palomares NP    REASON FOR VISIT:  Followup of functional tricuspid valve regurgitation with secondary pulmonary hypertension in the context of chronic atrial fibrillation.    HISTORY OF PRESENT ILLNESS:    This is an elderly 89-year-old  lady, lives on her own in a rambler, has a small dog,  for the last decade, walks with a cane, still driving.  Little hard of hearing, but mentally astute.    Her history is significant for:  1.  Chronic atrial fibrillation diagnosed in 06/2020.  It is likely that she has had it for years prior to that.  She is rate controlled on atenolol 25 mg and Eliquis anticoagulation.  2.  Benign essential hypertension, well controlled.  3.  Status post left carotid artery aneurysm surgical repair in 1971.  4.  Left breast cancer, status post surgery.  5.  Severe significant tricuspid valve regurgitation.    I had seen Ms. Graham last approximately 9 months ago when she was referred to [facility] due to significant TR and elevated RVSP.  Her echocardiogram had showed massive biatrial enlargement, mildly dilated right ventricle with normal systolic function and estimated RVSP of 43.  LV function is preserved.    At that time, she was doing very well, still driving, physically active.  I had put her on low-dose Furosemide 20 mg daily.    However, in the last few months, she has noticed more bilateral lower extremity edema.  She appears less agile than I remember from a few months ago.  She denies dyspnea or orthopnea.  She still does her own shopping and is able to walk and in the shopping market using a cart.  She has not had any hospitalizations.    DIAGNOSTIC DATA:    Her echocardiogram is concerning.  I personally reviewed the images with the patient.  It shows an interval worsening of tricuspid valve regurgitation.  Both her atria are massively enlarged  resulting in failure of coaptation of the tricuspid valve leaflets and severe (4+) tricuspid valve regurgitation.  The right ventricle is moderately dilated and systolic function is mildly reduced with a TAPSE of 1.6 cm and tissue Doppler systolic velocity of 8 cm/s.  The TR jet is wide, covers at least 60% of the massively enlarged right atrium, the Doppler profile is dense and triangular.  Her IVC is not dilated (1.7 cm) and does collapse with respiratory variation.  There is systolic flow reversal in the hepatic veins (suboptimal Doppler profile).  There is mild to moderate mitral valve regurgitation.  LV function is preserved.  Aortic valve was trileaflet and sclerotic without significant stenosis, mild regurgitation.    Labs:  Her creatinine is 0.8 with an estimated GFR of 58, potassium is 3.7.  Normal CBC with a hemoglobin of 14.8.      Her last EKG shows rate controlled atrial fibrillation at 58 BPM.    PHYSICAL EXAMINATION:    GENERAL:  Appears stated age, hard of hearing, but mentally alert.  CARDIOVASCULAR:  JVP is elevated up to the angle of the jaw with prominent V waves.  She does not have a parasternal heave.  Heart sounds are irregularly irregular.  TR murmur is not audible due to the presence of wide open TR.  No S3.  LUNGS:  Clear to auscultation.  EXTREMITIES:  She has 2+ pitting edema of both lower extremities extending up to her knees.  She has skin changes suggestive of chronic venostasis.    DIAGNOSES:    1.  Severe functional tricuspid valve regurgitation with mildly decreased right ventricular systolic function, NYHA class II-III.  2.  Massive biatrial enlargement.  3.  Persistent atrial fibrillation.    ASSESSMENT:   Elma is clinically deteriorating.  Even in the brief span of 10 months, she visibly appears more aged and frail.  Her blood pressure has dropped recently and her primary provider has had to appropriately cut back on the atenolol from 50 mg daily to 25 mg daily.  On  "echocardiogram, her tricuspid valve regurgitation is clearly worse, the right ventricle is at least moderately dilated (note report says mildly dilated) and the systolic function is starting to deteriorate as well.    Given her age of 89 years and frailty, I suspect she will be high-risk candidate for surgery.  Additionally due to the massive atrial enlargement, I am not sure this can be fixed with valve repair alone and she might need valve replacement.  Although she has known about this diagnosis on multiple visits over the last years, she states that she states \"I didn't know things were this bad.\"  She still lives independently and wants \"everything done,\" I tried to have a compassionate conversation about goals of care, but I do not believe she is read to discuss a conservative approach at this time.    PLAN:    1.  We will optimize her diuretic therapy.  -- Stop hydrochlorothiazide.  -- Decrease lisinopril from 10 mg daily to 2.5 mg daily.  Take in the evening.  -- Agree with a lower dose atenolol 25 mg daily.  -- Increase furosemide from 10 mg daily to 40 mg daily.    2.  Prescriptions renewed.    3.  Check basic metabolic panel and NT-proBNP in 1 week.    4.  I will refer her to see my CV surgical partner, Dr. Lillie Chiu, at [facility].  I have requested that she take one of her daughters with her.  As documented above, patient wants to explore all options, despite me having goals of care conversation with her.  I have directly communicated with Dr. Chiu regarding this patient.    5.  Follow up with Mare Palomares in 4 months.    Dia Vidal MD        D: 2021   T: 2021   MT: TAI    Name:     SANIA SANTANA  MRN:      [id number]        Account:      [id number]   :      1931           Service Date: 2021       Document: [id number]    "

## 2021-12-08 ENCOUNTER — CARE COORDINATION (OUTPATIENT)
Dept: CARDIOLOGY | Facility: CLINIC | Age: 86
End: 2021-12-08
Payer: COMMERCIAL

## 2021-12-08 NOTE — PROGRESS NOTES
Call from patient to set up lab at [facility] - orders placed and she will set up appointment. Patient reports at times her Blood pressures drop into the 90s but she does not notice any symptoms at the time. Tanya asks we call her tomorrow am to get a list of her weights and blood pressures as she does not have them handy right now. Message to nurse team to call her tomorrow.  Laurence Gallagher RN 12/08/21 3:39 PM

## 2021-12-17 ENCOUNTER — LAB (OUTPATIENT)
Dept: LAB | Facility: CLINIC | Age: 86
End: 2021-12-17
Payer: COMMERCIAL

## 2021-12-17 DIAGNOSIS — I48.21 PERMANENT ATRIAL FIBRILLATION (H): ICD-10-CM

## 2021-12-17 DIAGNOSIS — I07.1 SEVERE TRICUSPID VALVE REGURGITATION: ICD-10-CM

## 2021-12-17 DIAGNOSIS — Z85.3 PERSONAL HISTORY OF MALIGNANT NEOPLASM OF BREAST: ICD-10-CM

## 2021-12-17 DIAGNOSIS — I10 ESSENTIAL HYPERTENSION, BENIGN: ICD-10-CM

## 2021-12-17 DIAGNOSIS — R06.02 SHORTNESS OF BREATH: ICD-10-CM

## 2021-12-17 LAB
ANION GAP SERPL CALCULATED.3IONS-SCNC: 2 MMOL/L (ref 3–14)
BUN SERPL-MCNC: 25 MG/DL (ref 7–30)
CALCIUM SERPL-MCNC: 9.1 MG/DL (ref 8.5–10.1)
CHLORIDE BLD-SCNC: 102 MMOL/L (ref 94–109)
CO2 SERPL-SCNC: 34 MMOL/L (ref 20–32)
CREAT SERPL-MCNC: 0.85 MG/DL (ref 0.52–1.04)
GFR SERPL CREATININE-BSD FRML MDRD: 61 ML/MIN/1.73M2
GLUCOSE BLD-MCNC: 138 MG/DL (ref 70–99)
NT-PROBNP SERPL-MCNC: 1582 PG/ML (ref 0–450)
POTASSIUM BLD-SCNC: 4.2 MMOL/L (ref 3.4–5.3)
SODIUM SERPL-SCNC: 138 MMOL/L (ref 133–144)

## 2021-12-17 PROCEDURE — 80048 BASIC METABOLIC PNL TOTAL CA: CPT

## 2021-12-17 PROCEDURE — 83880 ASSAY OF NATRIURETIC PEPTIDE: CPT

## 2021-12-17 PROCEDURE — 36415 COLL VENOUS BLD VENIPUNCTURE: CPT

## 2021-12-22 NOTE — PROGRESS NOTES
Component      Latest Ref Rng & Units 12/17/2021   Sodium      133 - 144 mmol/L 138   Potassium      3.4 - 5.3 mmol/L 4.2   Chloride      94 - 109 mmol/L 102   Carbon Dioxide      20 - 32 mmol/L 34 (H)   Anion Gap      3 - 14 mmol/L 2 (L)   Urea Nitrogen      7 - 30 mg/dL 25   Creatinine      0.52 - 1.04 mg/dL 0.85   Calcium      8.5 - 10.1 mg/dL 9.1   Glucose      70 - 99 mg/dL 138 (H)   GFR Estimate      >60 mL/min/1.73m2 61   N-Terminal Pro Bnp      0 - 450 pg/mL 1,582 (H)     Called to patient for update on how she is feeling. She is feeling fine. No concerns with shortness of breath. Swelling increases somewhat to night but reduced again in am.  Diuretic: Lasix 40 mg daily.   Blood pressure 120-106/50's consistently.   Weights remain 153-155 lbs.  She has not made surgeon appointment as she is still working on her decision.  Due for visit with Mare Palomarse PA-C in 4 months (March 2022) and Elma ellis will call to make appointment.   Laurence Gallagher RN 12/22/21 10:44 AM    Dia Vidal MD McMahon, Bullis Mary, RN  Caller: Unspecified (12/29/2021)  Thanks.

## 2022-01-17 DIAGNOSIS — I10 ESSENTIAL HYPERTENSION, BENIGN: ICD-10-CM

## 2022-01-17 RX ORDER — LISINOPRIL 2.5 MG/1
10 TABLET ORAL EVERY EVENING
Qty: 120 TABLET | Refills: 4 | Status: SHIPPED | OUTPATIENT
Start: 2022-01-17 | End: 2022-01-24

## 2022-01-24 RX ORDER — LISINOPRIL 10 MG/1
10 TABLET ORAL EVERY EVENING
Qty: 90 TABLET | Refills: 3 | Status: SHIPPED | OUTPATIENT
Start: 2022-01-24 | End: 2023-01-19

## 2022-01-24 NOTE — PROGRESS NOTES
1/24/2022 Call from patient. Patient cannot refill lisinopril and was told to have us call insurance for Prior Authorization. Called to [phone] with member ID# [id number]. Per insurance patient formulary only covers 2 tablets per day of lisinopril so she need upgrade to a 5 mg or 10 mg tablet from the 2.5 mg tablet to refill lisinopril 10 mg daily.   Called back to patient. Tanya would like a 10 mg tablet; new prescription sent. Tanya agrees to watch closely on dose so she takes correct dose.  Laurence Gallagher RN 01/24/22 12:45 PM

## 2022-03-09 ENCOUNTER — OFFICE VISIT (OUTPATIENT)
Dept: OTHER | Facility: CLINIC | Age: 87
End: 2022-03-09
Payer: COMMERCIAL

## 2022-03-09 VITALS
WEIGHT: 155 LBS | DIASTOLIC BLOOD PRESSURE: 66 MMHG | SYSTOLIC BLOOD PRESSURE: 109 MMHG | TEMPERATURE: 98.2 F | OXYGEN SATURATION: 91 % | HEART RATE: 61 BPM | RESPIRATION RATE: 18 BRPM | HEIGHT: 62 IN | BODY MASS INDEX: 28.52 KG/M2

## 2022-03-09 DIAGNOSIS — Z00.00 ENCOUNTER FOR MEDICARE ANNUAL WELLNESS EXAM: Primary | ICD-10-CM

## 2022-03-09 PROCEDURE — G0439 PPPS, SUBSEQ VISIT: HCPCS | Performed by: FAMILY MEDICINE

## 2022-03-09 ASSESSMENT — ACTIVITIES OF DAILY LIVING (ADL): CURRENT_FUNCTION: NO ASSISTANCE NEEDED

## 2022-03-09 NOTE — PATIENT INSTRUCTIONS
Patient Education   Personalized Prevention Plan  You are due for the preventive services outlined below.  Your care team is available to assist you in scheduling these services.  If you have already completed any of these items, please share that information with your care team to update in your medical record.  Health Maintenance Due   Topic Date Due     Zoster (Shingles) Vaccine (1 of 2) Never done     ANNUAL REVIEW OF HM ORDERS  11/17/2021     FALL RISK ASSESSMENT  11/17/2021

## 2022-03-09 NOTE — PROGRESS NOTES
"Assessment & Plan     ICD-10-CM    1. Encounter for Medicare annual wellness exam  Z00.00        Follow Up/Next Steps  Return in about 53 weeks (around 3/15/2023) for Annual Wellness Visit.  Recommended that patient follow up with PCP for care of chronic conditions and regular health maintenance.    Counseling and Education  Reviewed preventive health counseling, as reflected in patient instructions      BMI:   Estimated body mass index is 28.35 kg/m  as calculated from the following:    Height as of this encounter: 1.575 m (5' 2\").    Weight as of this encounter: 70.3 kg (155 lb).         Appropriate preventive services were discussed with this patient, including applicable screening as appropriate for cardiovascular disease, diabetes, osteopenia/osteoporosis, and glaucoma.  As appropriate for age/gender, discussed screening for colorectal cancer, prostate cancer, breast cancer, and cervical cancer. Checklist reviewing preventive services available has been given to the patient.    Reviewed patients plan of care and provided an AVS. The Basic Care Plan (routine screening as documented in Health Maintenance) for Tanya meets the Care Plan requirement. This Care Plan has been established and reviewed with the Patient.    Visit Provider: Nupur Moe RN  Supervising Provider: Anamaria Leblanc MD, MD  [facility]       Keyur Wills is a 89 year old who is being seen for an Annual Wellness Visit in her home.    Healthy Habits:     In general, how would you rate your overall health?  Good    Frequency of exercise:  None    Do you usually eat at least 4 servings of fruit and vegetables a day, include whole grains    & fiber and avoid regularly eating high fat or \"junk\" foods?  Yes    Taking medications regularly:  Yes    Medication side effects:  None, No muscle aches and No significant flushing    Ability to successfully perform activities of daily living:  No " assistance needed    Home Safety:  No safety concerns identified    Hearing Impairment:  Difficulty following a conversation in a noisy restaurant or crowded room, feel that people are mumbling or not speaking clearly and need to ask people to speak up or repeat themselves    In the past 6 months, have you been bothered by leaking of urine?  No    In general, how would you rate your overall mental or emotional health?  Excellent      PHQ-2 Total Score: 0    Additional concerns today:  No    Do you feel safe in your environment? Yes    Have you ever done Advance Care Planning? (For example, a Health Directive, POLST, or a discussion with a medical provider or your loved ones about your wishes): Yes, patient states has an Advance Care Planning document and will bring a copy to the clinic.    Fall risk  Fallen 2 or more times in the past year?: No  Any fall with injury in the past year?: No  Cognitive Screening   1) Repeat 3 items (Leader, Season, Table)    2) Clock draw: NORMAL  3) 3 item recall: Recalls 3 objects  Results: 3 items recalled: COGNITIVE IMPAIRMENT LESS LIKELY    Mini-CogTM Copyright S [employer]. Licensed by the author for use in [facility] [employer]; reprinted with permission (soob@[employer]). All rights reserved.      Do you have sleep apnea, excessive snoring or daytime drowsiness?: no    Reviewed and updated as needed this visit by clinical staff   Tobacco  Allergies  Meds   Med Hx  Surg Hx  Fam Hx  Soc Hx        Social History     Tobacco Use     Smoking status: Never Smoker     Smokeless tobacco: Never Used   Substance Use Topics     Alcohol use: No       Current providers sharing in care for this patient include:   Patient Care Team:  Miriam Bird MD as PCP - General (Family Practice)  Miriam Bird MD (Family Practice)  Dia Vidal MD as Assigned Heart and Vascular Provider  Papito Thayer PA-C as Assigned PCP    The following health maintenance items  "are reviewed in Epic and correct as of today:  Health Maintenance Due   Topic Date Due     ZOSTER IMMUNIZATION (1 of 2) Never done     MEDICARE ANNUAL WELLNESS VISIT  11/17/2021     ANNUAL REVIEW OF HM ORDERS  11/17/2021     FALL RISK ASSESSMENT  11/17/2021       Mammogram Screening - Patient over age 75, has elected to discontinue screenings.  Pertinent mammograms are reviewed under the imaging tab.        Objective    /66 (BP Location: Right arm, Patient Position: Sitting)   Pulse 61   Temp 98.2  F (36.8  C) (Temporal)   Resp 18   Ht 1.575 m (5' 2\")   Wt 70.3 kg (155 lb)   LMP 06/02/1981   SpO2 91%   BMI 28.35 kg/m   Estimated body mass index is 28.35 kg/m  as calculated from the following:    Height as of this encounter: 1.575 m (5' 2\").    Weight as of this encounter: 70.3 kg (155 lb).  Physical Exam  Patient appears comfortable and in no acute distress.        Identified Health Risks:  "

## 2022-05-12 ENCOUNTER — IMMUNIZATION (OUTPATIENT)
Dept: NURSING | Facility: CLINIC | Age: 87
End: 2022-05-12
Payer: COMMERCIAL

## 2022-05-12 DIAGNOSIS — Z23 HIGH PRIORITY FOR 2019-NCOV VACCINE: Primary | ICD-10-CM

## 2022-05-12 PROCEDURE — 0054A COVID-19,PF,PFIZER (12+ YRS): CPT

## 2022-05-12 PROCEDURE — 99207 PR NO CHARGE LOS: CPT

## 2022-05-12 PROCEDURE — 91305 COVID-19,PF,PFIZER (12+ YRS): CPT

## 2022-05-29 DIAGNOSIS — I48.92 ATRIAL FLUTTER, PAROXYSMAL (H): ICD-10-CM

## 2022-05-29 DIAGNOSIS — I10 ESSENTIAL HYPERTENSION, BENIGN: ICD-10-CM

## 2022-06-02 RX ORDER — ATENOLOL 50 MG/1
TABLET ORAL
Qty: 45 TABLET | Refills: 1 | Status: SHIPPED | OUTPATIENT
Start: 2022-06-02 | End: 2022-11-13

## 2022-06-02 RX ORDER — APIXABAN 5 MG/1
TABLET, FILM COATED ORAL
Qty: 180 TABLET | Refills: 1 | Status: SHIPPED | OUTPATIENT
Start: 2022-06-02 | End: 2022-11-13

## 2022-06-20 NOTE — TELEPHONE ENCOUNTER
Pres[place] per [facility] [place].    Serjio Todd RN   
Yes

## 2022-09-09 ENCOUNTER — TRANSFERRED RECORDS (OUTPATIENT)
Dept: HEALTH INFORMATION MANAGEMENT | Facility: CLINIC | Age: 87
End: 2022-09-09

## 2022-10-04 NOTE — TELEPHONE ENCOUNTER
[facility]     Gisell Tinoco/TAMANNA     History and Physical    NAME: Darion Chávez   :  1969   MRN:  [id number]     Date/Time:  10/4/2022 9:01 AM    Patient PCP: Amanda Summers, NP  ______________________________________________________________________             Subjective:     CHIEF COMPLAINT: R abominal pain that goes to the back and pain with urination        HISTORY OF PRESENT ILLNESS:       Patient is a 48y.o. year old female with a history of PVD, HTN and fibromyalgia presents with acute right flank pain and pain with urination. It began early yesterday morning and has not occurred prior. The pain is sharp, severe, and constant without radiation. Patient confirms fever this morning (99.1?), flank pain, increased urgency, pain and burning with urination since yesterday morning. Confirms generalized pruritis, especially at lower and upper extremities bilaterally. Denies hematuria and previous history of kidney stones.     PMH: HTN, PVD, Fibromyalgia    Allergies: denies food, medication, and environmental allergies    SH: smokes 1-2- cigarettes per day, denies alcohol and illicit drug use    FH: mother - HTN, diabetes mellitus, CAD; father - HTN, diabetes mellitus, CAD, esophageal cancer; brother: PVD; 3 children: all have asthma    Labs:  UA on 10/3: calcium oxalate crystals present  CT scan abdomen and pelvis with contrast on 10/3: terminal ileum and proximal colon with fluid containing scattered areas of wall thickening  Low K (2.7)  Awaiting blood culture results        Past Medical History:   Diagnosis Date    Anxiety     CAD (coronary artery disease)     Fibromyalgia     Hypertension     Neuropathy     Peripheral artery disease ([facility] [address]     Psychiatric disorder     anxiety        Past Surgical History:   Procedure Laterality Date    HX ANKLE FRACTURE TX Left     surgical implants    HX  SECTION         Social History     Tobacco Use    Smoking status: Every Day     Packs/day: 0.50     Types: Cigarettes    Smokeless tobacco: Never Substance Use Topics    Alcohol use: Yes     Comment: occosionally         No family history on file. Allergies   Allergen Reactions    Codeine Nausea Only        Prior to Admission medications    Medication Sig Start Date End Date Taking? Authorizing Provider   diclofenac (VOLTAREN) 1 % gel Apply 2 g to affected area four (4) times daily. 9/25/22   Fern Kauffman PA-C   atorvastatin (LIPITOR) 10 mg tablet Take 1 Tablet by mouth daily. 9/3/22   Janes Bravo PA   ferrous sulfate 325 mg (65 mg iron) tablet Take 1 Tablet by mouth daily (with breakfast). 9/3/22   Janes Bravo PA   nitroglycerin (NITROSTAT) 0.4 mg SL tablet 1 Tablet by SubLINGual route as needed for Chest Pain. Up to 3 doses. Patient not taking: Reported on 10/4/2022 9/2/22   ANGELI Castro   folic acid (FOLVITE) 1 mg tablet Take 1 Tablet by mouth daily. 9/3/22   Janes Bravo PA   thiamine (B-1) 50 mg tablet Take 1 Tablet by mouth daily. 9/2/22   Janes Bravo PA   pregabalin (LYRICA) 100 mg capsule Take 100 mg by mouth three (3) times daily.     Other, MD William         Current Facility-Administered Medications:     atorvastatin (LIPITOR) tablet 10 mg, 10 mg, Oral, DAILY, Frankie Suarez MD, 10 mg at 10/04/22 1030    ferrous sulfate tablet 325 mg, 325 mg, Oral, DAILY WITH BREAKFAST, Frankie Suarez MD, 325 mg at 13/95/77 2701    folic acid (FOLVITE) tablet 1 mg, 1 mg, Oral, DAILY, Frankie Suarez MD, 1 mg at 10/04/22 0831    nitroglycerin (NITROSTAT) tablet 0.4 mg, 0.4 mg, SubLINGual, PRN, Frankie Suarez MD    thiamine mononitrate (B-1) tablet 50 mg, 50 mg, Oral, DAILY, Frankie Suarez MD, 50 mg at 10/04/22 0830    0.9% sodium chloride infusion, 75 mL/hr, IntraVENous, CONTINUOUS, Frankie Suarez MD    acetaminophen (TYLENOL) tablet 650 mg, 650 mg, Oral, Q6H PRN, 650 mg at 10/04/22 0831 **OR** acetaminophen (TYLENOL) suppository 650 mg, 650 mg, Rectal, Q6H PRN, Shira Alcantar MD polyethylene glycol (MIRALAX) packet 17 g, 17 g, Oral, DAILY PRN, Frankie Suarez MD    ondansetron (ZOFRAN ODT) tablet 4 mg, 4 mg, Oral, Q8H PRN **OR** ondansetron (ZOFRAN) injection 4 mg, 4 mg, IntraVENous, Q6H PRN, Frankie Suarez MD    [START ON 10/5/2022] enoxaparin (LOVENOX) injection 30 mg, 30 mg, SubCUTAneous, Q12H, Frankie Suarez MD    diphenhydrAMINE (BENADRYL) injection 12.5 mg, 12.5 mg, IntraVENous, Q4H PRN, Frankie Suarez MD, 12.5 mg at 10/04/22 0830    LAB DATA REVIEWED:    Recent Results (from the past 24 hour(s))   CBC WITH AUTOMATED DIFF    Collection Time: 10/03/22  7:28 PM   Result Value Ref Range    WBC 7.6 3.6 - 11.0 K/uL    RBC 3.73 (L) 3.80 - 5.20 M/uL    HGB 11.6 11.5 - 16.0 g/dL    HCT 36.5 35.0 - 47.0 %    MCV 97.9 80.0 - 99.0 FL    MCH 31.1 26.0 - 34.0 PG    MCHC 31.8 30.0 - 36.5 g/dL    RDW 15.2 (H) 11.5 - 14.5 %    PLATELET 766 814 - 251 K/uL    MPV 9.2 8.9 - 12.9 FL    NRBC 0.0 0.0  WBC    ABSOLUTE NRBC 0.00 0.00 - 0.01 K/uL    NEUTROPHILS 71 32 - 75 %    LYMPHOCYTES 23 12 - 49 %    MONOCYTES 6 5 - 13 %    EOSINOPHILS 0 0 - 7 %    BASOPHILS 0 0 - 1 %    IMMATURE GRANULOCYTES 0 0 - 0.5 %    ABS. NEUTROPHILS 5.3 1.8 - 8.0 K/UL    ABS. LYMPHOCYTES 1.8 0.8 - 3.5 K/UL    ABS. MONOCYTES 0.5 0.0 - 1.0 K/UL    ABS. EOSINOPHILS 0.0 0.0 - 0.4 K/UL    ABS. BASOPHILS 0.0 0.0 - 0.1 K/UL    ABS. IMM.  GRANS. 0.0 0.00 - 0.04 K/UL    DF AUTOMATED     METABOLIC PANEL, COMPREHENSIVE    Collection Time: 10/03/22  7:28 PM   Result Value Ref Range    Sodium 140 136 - 145 mmol/L    Potassium 2.7 (LL) 3.5 - 5.1 mmol/L    Chloride 102 97 - 108 mmol/L    CO2 25 21 - 32 mmol/L    Anion gap 13 5 - 15 mmol/L    Glucose 92 65 - 100 mg/dL    BUN 2 (L) 6 - 20 mg/dL    Creatinine 0.54 (L) 0.55 - 1.02 mg/dL    BUN/Creatinine ratio 4 (L) 12 - 20      eGFR >60 >60 ml/min/1.73m2    Calcium 9.3 8.5 - 10.1 mg/dL    Bilirubin, total 0.5 0.2 - 1.0 mg/dL    AST (SGOT) 13 (L) 15 - 37 U/L    ALT (SGPT) 9 (L) 12 - 78 U/L    Alk. phosphatase 95 45 - 117 U/L    Protein, total 7.5 6.4 - 8.2 g/dL    Albumin 2.5 (L) 3.5 - 5.0 g/dL    Globulin 5.0 (H) 2.0 - 4.0 g/dL    A-G Ratio 0.5 (L) 1.1 - 2.2     URINALYSIS W/ RFLX MICROSCOPIC    Collection Time: 10/03/22  8:40 PM   Result Value Ref Range    Color Yellow      Appearance Turbid (A) Clear      Specific gravity 1.023 1.003 - 1.030      pH (UA) 6.0 5.0 - 8.0      Protein 100 (A) Negative mg/dL    Glucose Negative Negative mg/dL    Ketone 80 (A) Negative mg/dL    Bilirubin Small (A) Negative      Blood Negative Negative      Urobilinogen 2.0 (H) 0.1 - 1.0 EU/dL    Nitrites Negative Negative      Leukocyte Esterase Negative Negative     URINE MICROSCOPIC    Collection Time: 10/03/22  8:40 PM   Result Value Ref Range    WBC 0-4 0 - 4 /hpf    RBC 0-5 0 - 5 /hpf    Bacteria Negative Negative /hpf    Mucus 2+ (A) Negative /lpf    CA Oxalate crystals 1+ (A) Negative    Hyaline cast 0-2 0 - 5 /lpf       XR Results (most recent):  Results from Hospital Encounter encounter on 09/25/22    XR FOREARM RT AP/LAT    Narrative  EXAM: XR FOREARM RT AP/LAT    INDICATION: PAIN. COMPARISON: None. FINDINGS: Two views of the right radius and ulna demonstrate no fracture or  other acute osseous, articular or soft tissue abnormality. Impression  No acute abnormality. CT ABD PELV W CONT   Final Result   The terminal ileum and proximal colon are fluid containing with scattered areas   of wall thickening. This may be due at least in part due to underdistention, but   enterocolitis due to nonspecific infection or inflammation is not excluded. No   bowel obstruction or other acute abnormality in the abdomen or pelvis. Review of Systems:  Constitutional: Negative for chills and fever. HENT: Confirms headache yesterday. Denies dizziness, lightheadedness  Eyes: Negative. Respiratory: Negative. Cardiovascular: Negative.     Gastrointestinal: Positive for abdominal pain, constipation, decreased appetite. Negative for nausea and vomiting. Skin: Negative. Neurological: Negative. Objective:   VITALS:    Visit Vitals  /84 (BP 1 Location: Left upper arm, BP Patient Position: Semi fowlers)   Pulse (!) 118   Temp 99.1 °F (37.3 °C)   Resp 16   Ht 5' 11\" (1.803 m)   Wt 117.9 kg (260 lb)   SpO2 99%   BMI 36.26 kg/m²       Physical Exam:   Constitutional: pt is oriented to person, place, and time. HENT:   Head: Normocephalic and atraumatic. Eyes: Pupils are equal, round, and reactive to light. EOM are normal.   Cardiovascular: Normal rate, regular rhythm and normal heart sounds. Pulmonary/Chest: Breath sounds normal. No wheezes. No rales. Exhibits no tenderness. Abdominal: Soft. Bowel sounds are normal. There is no abdominal tenderness. There is no rebound and no guarding. Tenderness on palpation at the R flank and RUQ. Musculoskeletal: Normal range of motion. Neurological: pt is alert and oriented to person, place, and time. Alert. Normal strength. Decreased sensation to light touch in upper extremities bilaterally. Displays a negative Romberg sign. ASSESSMENT:  Hypokalemia  Nephrolithiasis  Hypertension  Peripheral vein disease  Fibromyalgia  Generalized pruritis    PLAN:  Give Kcl  Abdominal X-xray for stones  IV fluids  Increase benadryl dosage      ________________________________________________________________________    Signed: Melvina Welch   *ATTENTION:  This note has been created by a medical student for educational purposes only. Please do not refer to the content of this note for clinical decision-making, billing, or other purposes. Please see attending physicians note to obtain clinical information on this patient. *

## 2022-11-11 DIAGNOSIS — I48.92 ATRIAL FLUTTER, PAROXYSMAL (H): ICD-10-CM

## 2022-11-11 DIAGNOSIS — I10 ESSENTIAL HYPERTENSION, BENIGN: ICD-10-CM

## 2022-11-11 NOTE — TELEPHONE ENCOUNTER
Routing refill request to provider for review/approval because:  Labs not current:  Eliquis-platelets   Patient needs to be seen because:  6 month visit past due  Not PSO for pt age > 79  Laurence Cartagena RN, BSN  [facility]

## 2022-11-13 RX ORDER — ATENOLOL 50 MG/1
TABLET ORAL
Qty: 45 TABLET | Refills: 1 | Status: SHIPPED | OUTPATIENT
Start: 2022-11-13 | End: 2023-02-22

## 2022-11-13 RX ORDER — APIXABAN 5 MG/1
TABLET, FILM COATED ORAL
Qty: 180 TABLET | Refills: 0 | Status: SHIPPED | OUTPATIENT
Start: 2022-11-13 | End: 2023-01-26

## 2022-11-13 NOTE — TELEPHONE ENCOUNTER
She has been seeing new cardiologist.  I can refill the Eliquis today so she is not without but I would prefer they manage this condition and medication.    Will refill atenolol.    Papito

## 2022-11-28 ENCOUNTER — TRANSFERRED RECORDS (OUTPATIENT)
Dept: FAMILY MEDICINE | Facility: CLINIC | Age: 87
End: 2022-11-28

## 2022-11-30 ENCOUNTER — TRANSCRIBE ORDERS (OUTPATIENT)
Dept: OTHER | Age: 87
End: 2022-11-30

## 2022-11-30 ENCOUNTER — THERAPY VISIT (OUTPATIENT)
Dept: PHYSICAL THERAPY | Facility: CLINIC | Age: 87
End: 2022-11-30
Payer: COMMERCIAL

## 2022-11-30 DIAGNOSIS — M54.50 LOW BACK PAIN: Primary | ICD-10-CM

## 2022-11-30 DIAGNOSIS — M54.50 ACUTE LOW BACK PAIN: ICD-10-CM

## 2022-11-30 DIAGNOSIS — M51.369 DDD (DEGENERATIVE DISC DISEASE), LUMBAR: ICD-10-CM

## 2022-11-30 PROCEDURE — 97161 PT EVAL LOW COMPLEX 20 MIN: CPT | Mod: GP | Performed by: PHYSICAL THERAPIST

## 2022-11-30 PROCEDURE — 97110 THERAPEUTIC EXERCISES: CPT | Mod: GP | Performed by: PHYSICAL THERAPIST

## 2022-11-30 NOTE — PROGRESS NOTES
[facility]    OUTPATIENT Physical Therapy ORTHOPEDIC EVALUATION  PLAN OF TREATMENT FOR OUTPATIENT REHABILITATION  (COMPLETE FOR INITIAL CLAIMS ONLY)  Patient's Last Name, First Name, M.I.  YOB: 1931  Tanya Graham    Provider s Name:  [facility]   Medical Record No.  [id number]   Start of Care Date:  11/30/22   Onset Date:   10/01/22   Treatment Diagnosis:  LBP, right sided Medical Diagnosis:  Acute low back pain       Goals:     11/30/22 0500   Body Part   Goals listed below are for LBP   Goal #1   Goal #1 ambulation   Previous Functional Level Miles patient could walk   Performance Level 1   Current Functional Level Blocks patient can walk   Performance Level 6 c SEC   STG [employer] Blocks patient will be able to  walk   Performance Level 3-6 c or s SEC upright posture   Rationale for safe household ambulation;for safe outdoor household ambulation;for safe community ambulation;to maintain proper body mechanics/posture while ambulating to avoid additional compensatory injury due to improper gait mechanics   Due Date 12/21/22    LTG [employer] patient will be able to walk   Performance Level 9-12   Rationale for safe household ambulation;for safe outdoor household ambulation;for safe community ambulation;to maintain proper body mechanics/posture while ambulating to avoid additional compensatory injury due to improper gait mechanics;to promote a healthy and active lifestyle   Due Date 01/11/23       Therapy Frequency:  1x/week  Predicted Duration of Therapy Intervention:  6 weeks    Jacques Holbrook, PT                 I CERTIFY THE NEED FOR THESE SERVICES FURNISHED UNDER        THIS PLAN OF TREATMENT AND WHILE UNDER MY CARE .             Physician Signature               Date    X_____________________________________________________                          Certification Date From:  11/30/22   Certification Date To:  01/11/23    Referring Provider:  Jacques Aden    Initial Assessment        See Epic Evaluation SOC Date: 11/30/22

## 2022-11-30 NOTE — PROGRESS NOTES
Physical Therapy Initial Evaluation  Subjective:  The history is provided by the patient.   Patient Health History  Tanya Graham being seen for LBP.     Problem began: 10/1/2022.   Problem occurred: Insidious   Pain is reported as 9/10 on pain scale.  General health as reported by patient is fair.  Pertinent medical history includes: cancer, heart problems, kidney disease, high blood pressure and osteoarthritis.   Red flags:  None as reported by patient.         Current medications:  High blood pressure medication and cardiac medication.    Current occupation is Retired.                     Therapist Generated HPI Evaluation  Problem details: Main concern is right sided LB, hip pain.  This started @ one week ago when she bent forward to tie her shoes.  She felt a catch/sharp pain and it has not gone away.  @ 6 weeks ago she had LBP and had relief after seeing her chiropractor 3x.  She saw Dr. Aden this past week at [facility] and had new x-rays.  Her sitting, standing is limited and is now using a cane for her pain and balance.  .         Type of problem:  Lumbar and sacroiliac.    This is a recurrent condition.  Condition occurred with:  Bending.  Where condition occurred: at home.  Patient reports pain:  Lower lumbar spine, lumbar spine right and SI joint right.  Pain is described as aching and sharp and is intermittent.  Pain radiates to:  No radiation. Pain is worse during the day.  Since onset symptoms are gradually worsening.  Associated symptoms:  Loss of strength and loss of balance. Symptoms are exacerbated by bending, certain positions, standing, sitting and lying down (pain with sit to and from stand transfers)  and relieved by activity/movement and rest.  Special tests included:  X-ray.  Previous treatment includes chiropractic.   Barriers include:  None as reported by patient.                        Objective:    Gait:  Dexa scores from 2021 indicate Osteopenia  Gait Type:  Antalgic   Weight Bearing Status:   WBAT   Assistive Devices:  Cane  Deviations:  General Deviations:  Base of support incr and stride length decr               Lumbar/SI Evaluation  ROM:    AROM Lumbar:   Flexion:          25+  Ext:                    10+   Side Bend:        Left:  Wnls    Right:  75+  Rotation:           Left:     Right:   Side Glide:        Left:     Right:           Lumbar Myotomes:  normal            Lumbar DTR's:  not assessed      Cord Signs:  not assessed    Lumbar Dermtomes:  not assessed                Neural Tension/Mobility:  Neural tension wnl lumbar: unable to assess, pt unable to lie supine or prone.        Lumbar Palpation:      Tenderness not present at Left:    Erector Spinae; Piriformis or Iliac Crest  Tenderness present at Right: PSIS  Tenderness not present at Right:  Erector Spinae; Piriformis or Iliac Crest  Functional Tests:  Core strength and proprioception lumbar: trial of left SL with table elevated 40 degrees.                                                           General     ROS    Assessment/Plan:    Patient is a 89 year old female with lumbar complaints.    Patient has the following significant findings with corresponding treatment plan.                Diagnosis 1:  LBP, right sided SIJ pain  Pain -  self management, education and home program  Decreased ROM/flexibility - therapeutic exercise, therapeutic activity and home program  Decreased strength - therapeutic exercise, therapeutic activities and home program  Impaired balance - neuro re-education, therapeutic activities and home program  Impaired gait - gait training, assistive devices and home program  Decreased function - therapeutic activities and home program  Impaired posture - neuro re-education, therapeutic activities and home program    Therapy Evaluation Codes:     1) Clinical presentation characteristics are:   Stable/Uncomplicated.  2) Decision-Making    Low complexity using standardized patient assessment instrument and/or measureable  assessment of functional outcome.  Cumulative Therapy Evaluation is: Low complexity.    Previous and current functional limitations:  (See Goal Flow Sheet for this information)    Short term and Long term goals: (See Goal Flow Sheet for this information)     Communication ability:  Patient appears to be able to clearly communicate and understand verbal and written communication and follow directions correctly.  Treatment Explanation - The following has been discussed with the patient:   RX ordered/plan of care  Anticipated outcomes  Possible risks and side effects  This patient would benefit from PT intervention to resume normal activities.   Rehab potential is fair.    Frequency:  1 X week, once daily  Duration:  for 6 weeks  Discharge Plan:  Achieve all LTG.  Independent in home treatment program.  Reach maximal therapeutic benefit.    Please refer to the daily flowsheet for treatment today, total treatment time and time spent performing 1:1 timed codes.

## 2022-12-06 ENCOUNTER — OFFICE VISIT (OUTPATIENT)
Dept: FAMILY MEDICINE | Facility: CLINIC | Age: 87
End: 2022-12-06
Payer: COMMERCIAL

## 2022-12-06 VITALS
HEART RATE: 61 BPM | TEMPERATURE: 98 F | BODY MASS INDEX: 27.12 KG/M2 | OXYGEN SATURATION: 96 % | SYSTOLIC BLOOD PRESSURE: 110 MMHG | DIASTOLIC BLOOD PRESSURE: 62 MMHG | RESPIRATION RATE: 16 BRPM | HEIGHT: 63 IN | WEIGHT: 153.1 LBS

## 2022-12-06 DIAGNOSIS — M54.50 ACUTE RIGHT-SIDED LOW BACK PAIN WITHOUT SCIATICA: Primary | ICD-10-CM

## 2022-12-06 PROCEDURE — 99213 OFFICE O/P EST LOW 20 MIN: CPT | Performed by: NURSE PRACTITIONER

## 2022-12-06 RX ORDER — LIDOCAINE 4 G/G
1 PATCH TOPICAL EVERY 24 HOURS
Qty: 15 PATCH | Refills: 0 | Status: SHIPPED | OUTPATIENT
Start: 2022-12-06

## 2022-12-06 RX ORDER — CYCLOBENZAPRINE HCL 5 MG
5 TABLET ORAL 3 TIMES DAILY PRN
Qty: 30 TABLET | Refills: 0 | Status: SHIPPED | OUTPATIENT
Start: 2022-12-06

## 2022-12-06 ASSESSMENT — PAIN SCALES - GENERAL: PAINLEVEL: EXTREME PAIN (8)

## 2022-12-06 NOTE — PROGRESS NOTES
"  Assessment & Plan     Acute right-sided low back pain without sciatica  Acute; advised patient to take as needed muscle relaxer to help with symptoms especially at bedtime to help with sleep. May also use lidocaine patch to localized area as needed for pain. Also encouraged RICE, heat, continue tylenol and physical therapy. Discussed lifting in the home and ensuring she moves every hour to prevent back from stiffening up.     - cyclobenzaprine (FLEXERIL) 5 MG tablet; Take 1 tablet (5 mg) by mouth 3 times daily as needed for muscle spasms Can take 1-2 tablets as needed.  - Lidocaine (LIDOCARE) 4 % Patch; Place 1 patch onto the skin every 24 hours To prevent lidocaine toxicity, patient should be patch free for 12 hrs daily.       BMI:   Estimated body mass index is 27.12 kg/m  as calculated from the following:    Height as of this encounter: 1.6 m (5' 3\").    Weight as of this encounter: 69.4 kg (153 lb 1.6 oz).       Return in about 2 weeks (around 12/20/2022) for if symptoms do not improve and/or worsen.    PORTER Weiss CNP  [facility] ENEDELIA Wills is a 89 year old, presenting for the following health issues:  Back Pain      HPI     Concern - Ongoing Back Pain   Onset: 1 month ago   Description: Ongoing back pain  Intensity: severe  Progression of Symptoms:  worsening  Accompanying Signs & Symptoms: nothing   Previous history of similar problem: NO  Precipitating factors:        Worsened by: Nothing   Alleviating factors:        Improved by: Nothing   Therapies tried and outcome: Tylenol and heating pads do not help.     Patient presents to clinic with continued low back pain for 1.5 months ago started having pain was treated by the chiropractor symptoms improved then went to try on shoes and symptoms worsened was seen via [facility] and an Xray was performed where they noted degenerative changes and arthritis encouraged the use of tylenol and PT. States it is only on the right " "hand side and does not radiate anywhere but feels she is going to \"break in half\" sometimes.       Review of Systems   Constitutional, HEENT, cardiovascular, pulmonary, gi and gu systems are negative, except as otherwise noted.      Objective    /62 (BP Location: Right arm, Patient Position: Sitting, Cuff Size: Adult Regular)   Pulse 61   Temp 98  F (36.7  C) (Oral)   Resp 16   Ht 1.6 m (5' 3\")   Wt 69.4 kg (153 lb 1.6 oz)   LMP 06/02/1981   SpO2 96%   BMI 27.12 kg/m    Body mass index is 27.12 kg/m .  Physical Exam   GENERAL: healthy, alert and no distress  RESP: lungs clear to auscultation - no rales, rhonchi or wheezes  CV: regular rate and rhythm, normal S1 S2, no S3 or S4, no murmur, click or rub, no peripheral edema and peripheral pulses strong  MS: normal muscle tone, decreased range of motion to back and no edema                "

## 2022-12-07 ENCOUNTER — THERAPY VISIT (OUTPATIENT)
Dept: PHYSICAL THERAPY | Facility: CLINIC | Age: 87
End: 2022-12-07
Payer: COMMERCIAL

## 2022-12-07 DIAGNOSIS — M54.50 ACUTE LOW BACK PAIN: Primary | ICD-10-CM

## 2022-12-07 PROCEDURE — 97140 MANUAL THERAPY 1/> REGIONS: CPT | Mod: GP

## 2022-12-07 PROCEDURE — 97110 THERAPEUTIC EXERCISES: CPT | Mod: GP

## 2022-12-29 ENCOUNTER — THERAPY VISIT (OUTPATIENT)
Dept: PHYSICAL THERAPY | Facility: CLINIC | Age: 87
End: 2022-12-29
Payer: COMMERCIAL

## 2022-12-29 DIAGNOSIS — M54.50 ACUTE LOW BACK PAIN: Primary | ICD-10-CM

## 2022-12-29 PROCEDURE — 97140 MANUAL THERAPY 1/> REGIONS: CPT | Mod: GP | Performed by: PHYSICAL THERAPIST

## 2022-12-29 PROCEDURE — 97112 NEUROMUSCULAR REEDUCATION: CPT | Mod: GP | Performed by: PHYSICAL THERAPIST

## 2022-12-29 PROCEDURE — 97110 THERAPEUTIC EXERCISES: CPT | Mod: GP | Performed by: PHYSICAL THERAPIST

## 2023-01-09 ENCOUNTER — THERAPY VISIT (OUTPATIENT)
Dept: PHYSICAL THERAPY | Facility: CLINIC | Age: 88
End: 2023-01-09
Payer: COMMERCIAL

## 2023-01-09 DIAGNOSIS — M54.50 ACUTE LOW BACK PAIN: Primary | ICD-10-CM

## 2023-01-09 PROCEDURE — 97140 MANUAL THERAPY 1/> REGIONS: CPT | Mod: GP | Performed by: PHYSICAL THERAPIST

## 2023-01-17 DIAGNOSIS — I10 ESSENTIAL HYPERTENSION, BENIGN: ICD-10-CM

## 2023-01-17 NOTE — TELEPHONE ENCOUNTER
Refill request from: Fax from [facility] pharmacy  Medication requested:   Lisinopril  Last office visit:  2021 - overdue for return visit - no appointments are scheduled.  Medication does not meet criteria for refill due to . Called to patient for further review.   Tanya reports will be filling lisinopril  at another doctor office and does not want to make an return visit with us at this time.Asked patient to call if would like to be seen in the future.   Laurence Gallagher RN 01/17/23 4:32 PM

## 2023-01-18 NOTE — TELEPHONE ENCOUNTER
Medication is managed by cardiology. It will be up to PCP to take over management or defer to cardiology. Patient seeing a Dr. Funez with [facility] most recently, last seen by  [facility] in 2021.

## 2023-01-18 NOTE — TELEPHONE ENCOUNTER
It looks like cardiology has been managing this.  She should follow-up with their office.  If she is wanting [facility] to take over she will need an appt scheduled as she has not seen her in over a year.     Bianca Carlson CNP

## 2023-01-18 NOTE — TELEPHONE ENCOUNTER
Patient called us asking for refill request. She would like to start getting her prescriptions for us. The patient reports that [facility] has filled her lisinopril in the past. Can we do this or do we need to get the patient set up for an appointment? The patient prefers the [facility] in [place] for their pharmacy.    Fela Hernadez

## 2023-01-19 NOTE — TELEPHONE ENCOUNTER
Pt states that she is seeing [facility] cardiologist now, but was told that PCP office needs to manage medication.     Advised pt that she would need an appointment with the provider since it has been over one year since she has been seen. Pt verbalized understanding and agrees. Appointment was scheduled:    Appointments in Next Year    Mar 02, 2023  1:30 PM  (Arrive by 1:10 PM)  Provider Visit with Papito Thayer PA-C  [facility] [phone]        Will route to PCP to see if breanna refill can be approved until her appointment.      Mona KNOTT RN

## 2023-01-23 RX ORDER — LISINOPRIL 10 MG/1
10 TABLET ORAL EVERY EVENING
Qty: 90 TABLET | Refills: 0 | Status: SHIPPED | OUTPATIENT
Start: 2023-01-23 | End: 2023-04-27

## 2023-01-24 DIAGNOSIS — I48.92 ATRIAL FLUTTER, PAROXYSMAL (H): ICD-10-CM

## 2023-01-25 NOTE — TELEPHONE ENCOUNTER
Routing refill request to provider for review/approval because:  Labs not current:  creatinine, platelets  Not PSO.pt > 79 y.o.  Pt has appointment 3/2/23  Laurence Cartagena RN, BSN  [facility]

## 2023-01-26 RX ORDER — APIXABAN 5 MG/1
TABLET, FILM COATED ORAL
Qty: 180 TABLET | Refills: 0 | Status: SHIPPED | OUTPATIENT
Start: 2023-01-26 | End: 2023-05-25

## 2023-02-21 DIAGNOSIS — I10 ESSENTIAL HYPERTENSION, BENIGN: ICD-10-CM

## 2023-02-22 RX ORDER — ATENOLOL 50 MG/1
TABLET ORAL
Qty: 45 TABLET | Refills: 0 | Status: SHIPPED | OUTPATIENT
Start: 2023-02-22 | End: 2023-05-25

## 2023-02-28 PROBLEM — M54.50 ACUTE LOW BACK PAIN: Status: RESOLVED | Noted: 2022-11-30 | Resolved: 2023-02-28

## 2023-02-28 NOTE — PROGRESS NOTES
Discharge Note    Progress reporting period is from initial evaluation date (please see noted date below) to Jan 9, 2023.  Linked Episodes   Type: Episode: Status: Noted: Resolved: Last update: Updated by:   PHYSICAL THERAPY LBP11/30/22 Active 11/30/2022 1/9/2023  2:02 PM Jacques Holbrook, PT      Comments:       Please see information below for last relevant information on current status.  Patient seen for 4 visits.    SUBJECTIVE  Subjective changes noted by patient:  Last session helped, less pain overall.  Once in a while catches.  .  Current pain level is 4/10.     Previous pain level was  6/10.   Changes in function:  Yes (See Goal flowsheet attached for changes in current functional level)  Adverse reaction to treatment or activity: None    OBJECTIVE  Changes noted in objective findings: review of balance, friend present to discuss importance of doing balance is a safe area, corner/chair in front, etc.  modified tandem.     ASSESSMENT/PLAN  Diagnosis: LBP, right sided   Updated problem list and treatment plan:   Decreased ROM/flexibility - HEP  Decreased function - HEP  Decreased strength - HEP  STG/LTGs have been met or progress has been made towards goals:  Yes, please see goal flowsheet for most current information  Assessment of Progress: current status is unknown.    Last current status: Pt is progressing as expected   Self Management Plans:  HEP  I have re-evaluated this patient and find that the nature, scope, duration and intensity of the therapy is appropriate for the medical condition of the patient.  Tanya continues to require the following intervention to meet STG and LTG's:  HEP.    Recommendations:  Discharge with current home program.  Patient to follow up with MD as needed.

## 2023-03-02 ENCOUNTER — OFFICE VISIT (OUTPATIENT)
Dept: FAMILY MEDICINE | Facility: CLINIC | Age: 88
End: 2023-03-02
Payer: COMMERCIAL

## 2023-03-02 VITALS
OXYGEN SATURATION: 99 % | BODY MASS INDEX: 28.16 KG/M2 | TEMPERATURE: 98.7 F | HEIGHT: 62 IN | SYSTOLIC BLOOD PRESSURE: 108 MMHG | DIASTOLIC BLOOD PRESSURE: 52 MMHG | WEIGHT: 153 LBS | HEART RATE: 77 BPM | RESPIRATION RATE: 16 BRPM

## 2023-03-02 DIAGNOSIS — I10 ESSENTIAL HYPERTENSION, BENIGN: ICD-10-CM

## 2023-03-02 DIAGNOSIS — E78.2 MIXED HYPERLIPIDEMIA: ICD-10-CM

## 2023-03-02 DIAGNOSIS — M15.0 PRIMARY OSTEOARTHRITIS INVOLVING MULTIPLE JOINTS: Primary | ICD-10-CM

## 2023-03-02 PROCEDURE — 99214 OFFICE O/P EST MOD 30 MIN: CPT | Performed by: PHYSICIAN ASSISTANT

## 2023-03-02 RX ORDER — ATENOLOL 50 MG/1
25 TABLET ORAL DAILY
Qty: 45 TABLET | Refills: 1 | Status: CANCELLED | OUTPATIENT
Start: 2023-03-02

## 2023-03-02 RX ORDER — LISINOPRIL 10 MG/1
10 TABLET ORAL EVERY EVENING
Qty: 90 TABLET | Refills: 1 | Status: CANCELLED | OUTPATIENT
Start: 2023-03-02

## 2023-03-02 RX ORDER — SIMVASTATIN 10 MG
10 TABLET ORAL AT BEDTIME
Qty: 90 TABLET | Refills: 3 | Status: SHIPPED | OUTPATIENT
Start: 2023-03-02

## 2023-03-02 RX ORDER — TRAMADOL HYDROCHLORIDE 50 MG/1
50 TABLET ORAL 2 TIMES DAILY
Qty: 10 TABLET | Refills: 0 | Status: SHIPPED | OUTPATIENT
Start: 2023-03-02 | End: 2023-03-03

## 2023-03-02 ASSESSMENT — PAIN SCALES - GENERAL: PAINLEVEL: SEVERE PAIN (7)

## 2023-03-02 NOTE — PROGRESS NOTES
Assessment & Plan     Primary osteoarthritis involving multiple joints  Patient to continue using all OTC and ice.  Add voltaren 4x times daily.  Can use tramadol rarely (BID) for break through pain.  Not long term solution.  Discussed trying cymbalta to prevent pain if these measures do not work.  - diclofenac (VOLTAREN) 1 % topical gel; Apply 4 g topically 4 times daily    Mixed hyperlipidemia  Refilled.  - simvastatin (ZOCOR) 10 MG tablet; Take 1 tablet (10 mg) by mouth At Bedtime    Essential hypertension, benign  Controlled well today        Return in about 2 weeks (around 3/16/2023) for if symptoms worsen or fail to improve.    ALEXANDRA De [facility] ENEDELIA Wills is a 89 year old, presenting for the following health issues:  Hip Pain, Hypertension, and Lipids      History of Present Illness       Reason for visit:  Right hip pain  Symptom onset:  3-4 weeks ago  Symptoms include:  Pain  Symptom intensity:  Severe  Symptom progression:  Worsening  Had these symptoms before:  No  What makes it worse:  Moving  What makes it better:  No    She eats 2-3 servings of fruits and vegetables daily.She consumes 1 sweetened beverage(s) daily.She exercises with enough effort to increase her heart rate 10 to 19 minutes per day.  She exercises with enough effort to increase her heart rate 3 or less days per week.   She is taking medications regularly.       Hyperlipidemia Follow-Up      Are you regularly taking any medication or supplement to lower your cholesterol?   Yes- simvastatin    Are you having muscle aches or other side effects that you think could be caused by your cholesterol lowering medication?  No    Hypertension Follow-up      Do you check your blood pressure regularly outside of the clinic? Yes     Are you following a low salt diet? Yes    Are your blood pressures ever more than 140 on the top number (systolic) OR more   than 90 on the bottom number (diastolic),  "for example 140/90? No    Patient having hip pain  No injury  No overuse  Was seen by [facility] and [facility] here in [facility] for DDD and arthritis in low back recently.  Xray showed arthritis in hips as well.  Ortho told her to see PCP for pain meds due to her blood thinner.  She has used the following with no results:  Ice  Icy hot  Tylenol  Lidocaine patches    Lives alone but manages household well on her own  Requesting something for pain      Review of Systems   Constitutional, HEENT, cardiovascular, pulmonary, gi and gu systems are negative, except as otherwise noted.      Objective    /52   Pulse 77   Temp 98.7  F (37.1  C) (Oral)   Resp 16   Ht 1.575 m (5' 2\")   Wt 69.4 kg (153 lb)   LMP 07/14/1966   SpO2 99%   BMI 27.98 kg/m    Body mass index is 27.98 kg/m .  Physical Exam   GENERAL: healthy, alert and no distress  NECK: no adenopathy, no asymmetry, masses, or scars and thyroid normal to palpation  RESP: lungs clear to auscultation - no rales, rhonchi or wheezes  CV: regular rate and rhythm, normal S1 S2, no S3 or S4, no murmur, click or rub, no peripheral edema and peripheral pulses strong  MS: no gross musculoskeletal defects noted, no edema  PSYCH: mentation appears normal, affect normal/bright                    "

## 2023-03-03 ENCOUNTER — TELEPHONE (OUTPATIENT)
Dept: FAMILY MEDICINE | Facility: CLINIC | Age: 88
End: 2023-03-03
Payer: COMMERCIAL

## 2023-03-03 DIAGNOSIS — M15.0 PRIMARY OSTEOARTHRITIS INVOLVING MULTIPLE JOINTS: Primary | ICD-10-CM

## 2023-03-03 RX ORDER — TRAMADOL HYDROCHLORIDE 50 MG/1
50 TABLET ORAL 2 TIMES DAILY
Qty: 6 TABLET | Refills: 0 | Status: SHIPPED | OUTPATIENT
Start: 2023-03-03 | End: 2023-03-06

## 2023-03-03 NOTE — TELEPHONE ENCOUNTER
Pt calling upset regarding prescription.  Provider note incomplete.  Adjusted quantity to reflect 3 days (#6) so that [facility] will fill.  If pt was to receive additional tabs PCP will be in on Monday and can address.     Bianca Carlson CNP

## 2023-03-03 NOTE — TELEPHONE ENCOUNTER
"Pt calls, informs:    ~pharmacy won't refill tramadol as directions state take for 3 days and amount is 10 tablets    \"Route: Take 1 tablet (50 mg) by mouth 2 times daily for 3 days - Oral\"    [facility] will need to be informed if okay to CHANGE TO TAKE FOR 5 DAYS, THAT IS WHAT PT THOUGHT IT WOULD BE, ALSO INFORM PT         Laurence Cartagena RN, BSN  [facility]    "

## 2023-05-21 DIAGNOSIS — I10 ESSENTIAL HYPERTENSION, BENIGN: ICD-10-CM

## 2023-05-21 DIAGNOSIS — I48.92 ATRIAL FLUTTER, PAROXYSMAL (H): ICD-10-CM

## 2023-05-25 RX ORDER — ATENOLOL 50 MG/1
TABLET ORAL
Qty: 45 TABLET | Refills: 0 | Status: SHIPPED | OUTPATIENT
Start: 2023-05-25 | End: 2023-09-07

## 2023-05-25 RX ORDER — APIXABAN 5 MG/1
TABLET, FILM COATED ORAL
Qty: 180 TABLET | Refills: 0 | Status: SHIPPED | OUTPATIENT
Start: 2023-05-25 | End: 2023-07-13

## 2023-05-25 RX ORDER — LISINOPRIL 10 MG/1
TABLET ORAL
Qty: 90 TABLET | Refills: 0 | Status: SHIPPED | OUTPATIENT
Start: 2023-05-25 | End: 2023-09-07

## 2023-05-25 NOTE — TELEPHONE ENCOUNTER
Hpi Title: Evaluation of Skin Lesions
Patient due for wellness and labs.  Please help schedule.    Aleixs  
Routing refill request to provider for review/approval because:  Labs not current:  Platelets, Creatinine, Potassium  Patient is 18 - 79 years of age    Creatinine   Date Value Ref Range Status   12/17/2021 0.85 0.52 - 1.04 mg/dL Final   04/27/2021 0.88 0.52 - 1.04 mg/dL Final     Potassium   Date Value Ref Range Status   12/17/2021 4.2 3.4 - 5.3 mmol/L Final   04/27/2021 3.7 3.4 - 5.3 mmol/L Final     Alesia HAGAN RN, BSN        
How Severe Are Your Spot(S)?: moderate
Have Your Spot(S) Been Treated In The Past?: has not been treated

## 2023-05-31 RX ORDER — TORSEMIDE 20 MG/1
TABLET ORAL
Qty: 270 TABLET | Refills: 3 | OUTPATIENT
Start: 2023-05-31

## 2023-06-01 DIAGNOSIS — I48.92 ATRIAL FLUTTER, PAROXYSMAL (H): ICD-10-CM

## 2023-06-05 RX ORDER — APIXABAN 5 MG/1
TABLET, FILM COATED ORAL
Qty: 180 TABLET | Refills: 0 | OUTPATIENT
Start: 2023-06-05

## 2023-07-08 DIAGNOSIS — I48.92 ATRIAL FLUTTER, PAROXYSMAL (H): ICD-10-CM

## 2023-07-13 RX ORDER — APIXABAN 5 MG/1
TABLET, FILM COATED ORAL
Qty: 180 TABLET | Refills: 0 | Status: SHIPPED | OUTPATIENT
Start: 2023-07-13

## 2024-10-28 ENCOUNTER — IMMUNIZATION (OUTPATIENT)
Dept: FAMILY MEDICINE | Facility: CLINIC | Age: 89
End: 2024-10-28
Payer: COMMERCIAL

## 2024-10-28 DIAGNOSIS — Z23 HIGH PRIORITY FOR 2019-NCOV VACCINE: ICD-10-CM

## 2024-10-28 DIAGNOSIS — Z23 NEED FOR PROPHYLACTIC VACCINATION AND INOCULATION AGAINST INFLUENZA: Primary | ICD-10-CM

## 2024-10-28 PROCEDURE — 90662 IIV NO PRSV INCREASED AG IM: CPT

## 2024-10-28 PROCEDURE — 90480 ADMN SARSCOV2 VAC 1/ONLY CMP: CPT

## 2024-10-28 PROCEDURE — G0008 ADMIN INFLUENZA VIRUS VAC: HCPCS

## 2024-10-28 PROCEDURE — 91320 SARSCV2 VAC 30MCG TRS-SUC IM: CPT

## 2024-11-25 ENCOUNTER — TELEPHONE (OUTPATIENT)
Dept: FAMILY MEDICINE | Facility: CLINIC | Age: 89
End: 2024-11-25
Payer: COMMERCIAL

## 2024-11-25 NOTE — CONFIDENTIAL NOTE
Patient Quality Outreach    Patient is due for the following:   Physical Annual Wellness Visit    Action(s) Taken:   Schedule a Annual Wellness Visit    Type of outreach:    Sent [employer] message.    Questions for provider review:    None           Seamus Painter MA

## 2024-11-25 NOTE — LETTER
[facility]  [address] [address] [phone]       December 11, 2024    Tanyacaesar Graham  [address] [facility] [place] [address]    Dear Elma,    We care about your health and have reviewed your health plan and are making recommendations based on this review, to optimize your health.    You are in particular need of attention regarding:  -Wellness (Physical) Visit     We are recommending that you:  -schedule a WELLNESS (Physical) APPOINTMENT with me.   I will check fasting labs the same day - nothing to eat except water and meds for 8-10 hours prior.    In addition, here is a list of due or overdue Health Maintenance reminders.    Health Maintenance Due   Topic Date Due    Zoster (Shingles) Vaccine (1 of 2) Never done    RSV VACCINE (1 - 1-dose 75+ series) Never done    Diptheria Tetanus Pertussis (DTAP/TDAP/TD) Vaccine (1 - Tdap) 11/28/2017    Cholesterol Lab  11/17/2021    Basic Metabolic Panel  12/17/2022    Annual Wellness Visit  03/09/2023    FALL RISK ASSESSMENT  03/09/2023    PHQ-2 (once per calendar year)  01/01/2024    ANNUAL REVIEW OF HM ORDERS  03/02/2024    Osteoporosis Screening  07/26/2024       To address the above recommendations, we encourage you to contact us at [phone], via [employer] or by contacting Central Scheduling toll free at [phone] 24 hours a day. They will assist you with finding the most convenient time and location.    Thank you for trusting [facility] and we appreciate the opportunity to serve you.  We look forward to supporting your healthcare needs in the future.    Healthy Regards,    Your [facility] Team

## 2024-12-11 NOTE — CONFIDENTIAL NOTE
Patient Quality Outreach    Patient is due for the following:   Physical Annual Wellness Visit    Action(s) Taken:   Schedule a Annual Wellness Visit    Type of outreach:    Sent letter.    Questions for provider review:    None           Seamus Painter MA